# Patient Record
Sex: FEMALE | Race: WHITE | Employment: OTHER | URBAN - METROPOLITAN AREA
[De-identification: names, ages, dates, MRNs, and addresses within clinical notes are randomized per-mention and may not be internally consistent; named-entity substitution may affect disease eponyms.]

---

## 2017-01-02 ENCOUNTER — TELEPHONE (OUTPATIENT)
Dept: FAMILY MEDICINE | Facility: CLINIC | Age: 82
End: 2017-01-02

## 2017-01-02 NOTE — TELEPHONE ENCOUNTER
Reason for Call:  Medication or medication refill:esteban please call to clarify order    Do you use a [facility]?  Name of the pharmacy and phone number for the current request:     [id number]  [place], [place] - [address] [facility] [address] - [place], [place] - [address] AT [facility] [address]  [facility], [address]      Name of the medication requested:     Other request:     Can we leave a detailed message on this number? YES    Phone number patient can be reached at: Other phone number:     Best Time: any    Call taken on 1/2/2017 at 4:25 PM by Catherine Barnett

## 2017-01-03 NOTE — TELEPHONE ENCOUNTER
This prescription was prescribed by her neurologist - Dr. Connolly from [facility] neurology [facility].  Please advise pharmacist    Man Stovall MD

## 2017-01-19 ENCOUNTER — TELEPHONE (OUTPATIENT)
Dept: FAMILY MEDICINE | Facility: CLINIC | Age: 82
End: 2017-01-19

## 2017-01-19 DIAGNOSIS — G50.0 TRIGEMINAL NEURALGIA: Primary | ICD-10-CM

## 2017-01-19 RX ORDER — CARBAMAZEPINE 200 MG/1
TABLET ORAL
Qty: 120 TABLET | Refills: 0 | COMMUNITY
Start: 2017-01-19 | End: 2018-01-29

## 2017-01-19 RX ORDER — ACETAMINOPHEN 500 MG
1000 TABLET ORAL 3 TIMES DAILY PRN
COMMUNITY
Start: 2017-01-19 | End: 2017-07-12

## 2017-01-19 NOTE — TELEPHONE ENCOUNTER
"Call back to Chavo with [facility].  Chavo states patient has no been taking Tegretol as scheduled, does not always need it.  Chavo needing order to state \"PRN\" otherwise recording as patient refusing medication.  Sending to provider to ok change to PRN status.  If ok, will change in med list and refax medication list to facility to show sig.  Angi Gan RN       "

## 2017-01-19 NOTE — TELEPHONE ENCOUNTER
Reason for Call:  Other prescription    Detailed comments: [facility] [facility] has a question on  carBAMazepine (TEGRETOL) 200 MG tablet     [facility] [place] # [phone]    Best Time: anytime    Can we leave a detailed message on this number? YES    Call taken on 1/19/2017 at 8:31 AM by Danny Paniagua

## 2017-01-19 NOTE — TELEPHONE ENCOUNTER
Prescription reordered  As three times daily as needed     please refax medication list    Man Stovall MD

## 2017-01-19 NOTE — TELEPHONE ENCOUNTER
I did not intend to change the medication.  The correct prescription for Tegretol to be taken as needed was written  Man Stovall MD

## 2017-04-06 DIAGNOSIS — M25.511 NONTRAUMATIC SHOULDER PAIN, RIGHT: ICD-10-CM

## 2017-04-06 RX ORDER — TRAMADOL HYDROCHLORIDE 50 MG/1
TABLET ORAL
Qty: 120 TABLET | Refills: 3 | Status: SHIPPED | OUTPATIENT
Start: 2017-04-06 | End: 2017-10-03

## 2017-04-06 NOTE — TELEPHONE ENCOUNTER
Reason for Call:  Medication or medication refill:    Do you use a [facility]?  Name of the pharmacy and phone number for the current request:       [facility], [address]      Name of the medication requested: traMADol (ULTRAM) 50 MG tablet    Other request:     Can we leave a detailed message on this number? YES    Phone number patient can be reached at: [phone] paul with [facility] [place] [facility] [place]    Best Time: anytime    Call taken on 4/6/2017 at 11:42 AM by Adeel Sears

## 2017-04-06 NOTE — TELEPHONE ENCOUNTER
Controlled Substance Refill Request for tramadol  Problem List Complete:  Yes    Last Written Prescription Date:  09/06/16  Last Fill Quantity: 120,   # refills: 3    Last Office Visit with [facility] primary care provider: 09/06/16    Clinic visit frequency required:      Future Office visit:   Next 5 appointments (look out 90 days)     May 08, 2017 10:30 AM CDT   Office Visit with Man Stovall MD   [facility]    [address] [facility] [id number] [phone]                  Controlled substance agreement on file: No.     [facility]   checked in past 6 months?  No, route to RN

## 2017-05-04 ENCOUNTER — TRANSFERRED RECORDS (OUTPATIENT)
Dept: HEALTH INFORMATION MANAGEMENT | Facility: CLINIC | Age: 82
End: 2017-05-04

## 2017-05-08 ENCOUNTER — OFFICE VISIT (OUTPATIENT)
Dept: FAMILY MEDICINE | Facility: CLINIC | Age: 82
End: 2017-05-08
Payer: COMMERCIAL

## 2017-05-08 VITALS
HEIGHT: 65 IN | WEIGHT: 109 LBS | SYSTOLIC BLOOD PRESSURE: 153 MMHG | HEART RATE: 66 BPM | OXYGEN SATURATION: 95 % | BODY MASS INDEX: 18.16 KG/M2 | DIASTOLIC BLOOD PRESSURE: 76 MMHG | TEMPERATURE: 97.8 F

## 2017-05-08 DIAGNOSIS — R26.89 BALANCE PROBLEM: ICD-10-CM

## 2017-05-08 DIAGNOSIS — Z23 NEED FOR VARICELLA VACCINE: ICD-10-CM

## 2017-05-08 DIAGNOSIS — D63.1 ANEMIA OF CHRONIC KIDNEY FAILURE, STAGE 3 (MODERATE) (H): ICD-10-CM

## 2017-05-08 DIAGNOSIS — N18.30 ANEMIA OF CHRONIC KIDNEY FAILURE, STAGE 3 (MODERATE) (H): ICD-10-CM

## 2017-05-08 DIAGNOSIS — N18.30 CKD (CHRONIC KIDNEY DISEASE) STAGE 3, GFR 30-59 ML/MIN (H): Primary | ICD-10-CM
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
## 2017-05-08 PROCEDURE — 99213 OFFICE O/P EST LOW 20 MIN: CPT | Mod: 25 | Performed by: INTERNAL MEDICINE

## 2017-05-08 PROCEDURE — 86787 VARICELLA-ZOSTER ANTIBODY: CPT | Performed by: INTERNAL MEDICINE

## 2017-05-08 PROCEDURE — 90471 IMMUNIZATION ADMIN: CPT | Performed by: INTERNAL MEDICINE

## 2017-05-08 RX ORDER — IRBESARTAN AND HYDROCHLOROTHIAZIDE 300; 12.5 MG/1; MG/1
1 TABLET, FILM COATED ORAL DAILY
COMMUNITY
End: 2018-08-27

## 2017-05-08 ASSESSMENT — ANXIETY QUESTIONNAIRES
IF YOU CHECKED OFF ANY PROBLEMS ON THIS QUESTIONNAIRE, HOW DIFFICULT HAVE THESE PROBLEMS MADE IT FOR YOU TO DO YOUR WORK, TAKE CARE OF THINGS AT HOME, OR GET ALONG WITH OTHER PEOPLE: NOT DIFFICULT AT ALL
7. FEELING AFRAID AS IF SOMETHING AWFUL MIGHT HAPPEN: NOT AT ALL
2. NOT BEING ABLE TO STOP OR CONTROL WORRYING: NOT AT ALL
6. BECOMING EASILY ANNOYED OR IRRITABLE: NOT AT ALL
3. WORRYING TOO MUCH ABOUT DIFFERENT THINGS: SEVERAL DAYS
1. FEELING NERVOUS, ANXIOUS, OR ON EDGE: SEVERAL DAYS

## 2017-05-08 ASSESSMENT — PATIENT HEALTH QUESTIONNAIRE - PHQ9: 5. POOR APPETITE OR OVEREATING: SEVERAL DAYS

## 2017-05-08 NOTE — PROGRESS NOTES
"[facility]  CLINIC PROGRESS NOTE    Subjective:  Chronic kidney disease   Kalli Brown has had reduction in furosemide dose from 80 mg twice per day down now to 60 mg once per day as creainine was a bit elevated and is being monitored closely in nephrology and she is watching her sodium levels closely.     Hemoglobin has also been monitored and she has had aranesp and continues on oral iron.      Past medical history, medications, allergies, social history, family history reviewed and updated in [facility] as of 5/8/2017 .    ROS  CONSTITUTIONAL: no fatigue, no unexpected change in weight  SKIN: no worrisome rashes, no worrisome moles, no worrisome lesions  EYES: no acute vision problems or changes  ENT: no ear problems, no mouth problems, no throat problems  RESP: no significant cough, no shortness of breath  CV: no chest pain, no palpitations,   GI: no nausea, no vomiting, no constipation, no diarrhea  : no frequency, no dysuria, no hematuria  MS: no longer ambulatory, but would like to consider this - has had a history of balance issue and falls in the past  PSYCHIATRIC: no changes in mood or affect      Objective:  Vitals  /76 (BP Location: Left arm, Patient Position: Chair, Cuff Size: Child)  Pulse 66  Temp 97.8  F (36.6  C) (Tympanic)  Ht 5' 5\" (1.651 m)  Wt 109 lb (49.4 kg)  SpO2 95%  Breastfeeding? No  BMI 18.14 kg/m2  GEN: Alert Oriented x3 NAD  HEENT: Atraumatic, normocephalic, neck supple  CV: RRR no murmurs or rubs  PULM: CTA no wheezes or crackles  ABD: Soft, nontender nondistended, no hepatosplenomegally  SKIN: small scaly lesion dorsum of left hand  EXT:  no edema bilateral lower extremities  NEURO:  No focal neurologic deficits  PSYCH: Mood good, affect mood congruent    No results found for this or any previous visit (from the past 24 hour(s)).    Assessment/Plan:  Patient Instructions   (N18.3) CKD (chronic kidney disease) stage 3, GFR 30-59 ml/min  (primary encounter " diagnosis)  Comment: We will continue follow up in nephrology  Plan:     (N18.3,  D63.1) Anemia of chronic kidney failure, stage 3 (moderate)  Comment: Also, noted that Aranesp was recently started in addition to oral iron replacement  Plan:     (Z23) Need for varicella vaccine  Comment: We will check antibody titers today and request zoster vaccine pending results.   Plan: Varicella Zoster Virus Antibody IgG          Lower extremity weakness  COmment; we will refer to physical therapy through [facility]      15 minutes spent with patient.  Over 50% of time counseling    Follow up in 3 months     Disclaimer: This note consists of symbols derived from keyboarding, dictation and/or voice recognition software. As a result, there may be errors in the script that have gone undetected. Please consider this when interpreting information found in this chart.    Man Stovall MD  [phone]

## 2017-05-08 NOTE — MR AVS SNAPSHOT
After Visit Summary   5/8/2017    Kalli Brown    MRN: [id number]           Patient Information     Date Of Birth          2/26/1929        Visit Information        Provider Department      5/8/2017 10:30 AM Man Stovall MD [facility]        Today's Diagnoses     CKD (chronic kidney disease) stage 3, GFR 30-59 ml/min    -  1    Anemia of chronic kidney failure, stage 3 (moderate)        Need for varicella vaccine        Fall, sequela        Balance problem          Care Instructions    (N18.3) CKD (chronic kidney disease) stage 3, GFR 30-59 ml/min  (primary encounter diagnosis)  Comment: We will continue follow up in nephrology  Plan:     (N18.3,  D63.1) Anemia of chronic kidney failure, stage 3 (moderate)  Comment: Also, noted that Aranesp was recently started in addition to oral iron replacement  Plan:     (Z23) Need for varicella vaccine  Comment: We will check antibody titers today and request zoster vaccine pending results.   Plan: Varicella Zoster Virus Antibody IgG          Lower extremity weakness  COmment; we [facility]  [place]            Follow-ups after your visit        Additional Services     PHYSICAL THERAPY REFERRAL       [facility]    Special Instructions/Modalities: none  Special Programs: Balance/Vestibulara    Please be aware that coverage of these services is subject to the terms and limitations of your health insurance plan.  Call member services at your health plan with any benefit or coverage questions.      **Note to Provider:  If you are referring outside of [place] for the therapy appointment, please list the name of the location in the  special instructions  above, print the referral and give to the patient to schedule the appointment.                  Who to contact     If you have questions or need follow up information about today's clinic visit or your schedule please contact [facility]  "[place] directly at [phone].  Normal or non-critical lab and imaging results will be communicated to you by MyChart, letter or phone within 4 business days after the clinic has received the results. If you do not hear from us within 7 days, please contact the clinic through [employer] One[employer] or phone. If you have a critical or abnormal lab result, we will notify you by phone as soon as possible.  Submit refill requests through Catapooolt or call your pharmacy and they will forward the refill request to us. Please allow 3 business days for your refill to be completed.          Additional Information About Your Visit        Catapooolt Information     Catapooolt lets you send messages to your doctor, view your test results, renew your prescriptions, schedule appointments and more. To sign up, go to [email or web address] . Click on \"Log in\" on the left side of the screen, which will take you to the Welcome page. Then click on \"Sign up Now\" on the right side of the page.     You will be asked to enter the access code listed below, as well as some personal information. Please follow the directions to create your username and password.     Your access code is: [email or web address]  Expires: 2017 11:14 AM     Your access code will  in 90 days. If you need help or a new code, please call your [place] clinic or [phone].        Care EveryWhere ID     This is your Care EveryWhere ID. This could be used by other organizations to access your [place] medical records  [id number]        Your Vitals Were     Pulse Temperature Height Pulse Oximetry Breastfeeding? BMI (Body Mass Index)    66 97.8  F (36.6  C) (Tympanic) 5' 5\" (1.651 m) 95% No 18.14 kg/m2       Blood Pressure from Last 3 Encounters:   17 153/76   16 137/78   06/10/16 133/73    Weight from Last 3 Encounters:   17 109 lb (49.4 kg)   16 110 lb (49.9 kg)   06/10/16 110 lb 12.8 oz (50.3 kg)              We Performed the Following     PHYSICAL THERAPY " REFERRAL     Varicella Zoster Virus Antibody IgG          Today's Medication Changes          These changes are accurate as of: 5/8/17 11:14 AM.  If you have any questions, ask your nurse or doctor.               Stop taking these medicines if you haven't already. Please contact your care team if you have questions.     albuterol 108 (90 BASE) MCG/ACT Inhaler   Commonly known as:  PROAIR HFA/PROVENTIL HFA/VENTOLIN HFA   Stopped by:  Man Stovall MD           BENICAR HCT 40-12.5 MG per tablet   Generic drug:  olmesartan-hydrochlorothiazide   Stopped by:  Man Stovall MD                    Primary Care Provider Office Phone # Fax #    Man Stovall [phone]       [facility] [address] [facility] [address]  [facility] [address]        Thank you!     Thank you for choosing [facility]  for your care. Our goal is always to provide you with excellent care. Hearing back from our patients is one way we can continue to improve our services. Please take a few minutes to complete the written survey that you may receive in the mail after your visit with us. Thank you!             Your Updated Medication List - Protect others around you: Learn how to safely use, store and throw away your medicines at www.disposemymeds.org.          This list is accurate as of: 5/8/17 11:14 AM.  Always use your most recent med list.                   Brand Name Dispense Instructions for use    acetaminophen 500 MG tablet    TYLENOL     Take 2 tablets (1,000 mg) by mouth 3 times daily as needed       AMLODIPINE BESYLATE PO      Take 10 mg by mouth daily       ASPIRIN PO      Take 81 mg by mouth daily       calcium citrate-vitamin D 315-250 MG-UNIT Tabs per tablet    CITRACAL     Take 1-2 tablets by mouth daily       carBAMazepine 200 MG tablet    TEGretol    120 tablet    Take 100 mg as needed for pain on scale 1-5 and 200 mg as needed  for pain on scale 6-10       docusate sodium  250 MG capsule    COLACE    90 capsule    Take 1 capsule (250 mg) by mouth daily as needed for constipation       ferrous sulfate 325 (65 FE) MG tablet    IRON    180 tablet    Take 2 tablets (650 mg) by mouth daily (with breakfast)       HALLS COUGH DROPS 7 MG Lozg   Generic drug:  menthol     84 lozenge    Take 1 lozenge (7 mg) by mouth every hour as needed for cough       irbesartan-hydrochlorothiazide 300-12.5 MG per tablet    AVALIDE     Take 1 tablet by mouth daily       LASIX 80 MG tablet   Generic drug:  furosemide     30 tablet    Take 60 mg by mouth daily       latanoprost 0.005 % ophthalmic solution    XALATAN     Place 1 drop into both eyes At Bedtime       MIRALAX PO      Take 1 packet by mouth daily as needed       mirtazapine 7.5 MG Tabs tablet    REMERON    90 tablet    Take 1 tablet (7.5 mg) by mouth At Bedtime       traMADol 50 MG tablet    ULTRAM    120 tablet    Take 1 tablet by mouth scheduled BID and every 6 hours PRN for moderate pain.       VITAMIN D (CHOLECALCIFEROL) PO      Take 1,000 Units by mouth daily

## 2017-05-08 NOTE — LETTER
[place] [address] [facility]  [address]  [place]  [address]  Tel: [phone]    May 9, 2017    Kalli Joyner Kevin  [address] [facility] [address]        Dear Ms. Brown,    It does appear that you have been exposed to chicken pox in the past and I would recommend a shingles vaccine to prevent this if you are agreeable.  Call and schedule a nurse only appointment at [phone].     If you have any further questions or problems, please contact our office.      Sincerely,    Man Stovall MD/kylee    Results for orders placed or performed in visit on 05/08/17   Varicella Zoster Virus Antibody IgG   Result Value Ref Range    Varicella Zoster Virus Antibody IgG 2.5 (H) 0.0 - 0.8 AI           Enclosure: Lab Results

## 2017-05-08 NOTE — NURSING NOTE
"Chief Complaint   Patient presents with     RECHECK     last seen 9/2016 for  Anemia & CKD (f/u w/ nephrologist) , left shoulder &  compression fx & balance  (cont Phy Therapy)        Initial /76 (BP Location: Left arm, Patient Position: Chair, Cuff Size: Child)  Pulse 66  Temp 97.8  F (36.6  C) (Tympanic)  Ht 5' 5\" (1.651 m)  Wt 109 lb (49.4 kg)  SpO2 95%  Breastfeeding? No  BMI 18.14 kg/m2 Estimated body mass index is 18.14 kg/(m^2) as calculated from the following:    Height as of this encounter: 5' 5\" (1.651 m).    Weight as of this encounter: 109 lb (49.4 kg).  Medication Reconciliation: complete  "

## 2017-05-08 NOTE — PATIENT INSTRUCTIONS
(N18.3) CKD (chronic kidney disease) stage 3, GFR 30-59 ml/min  (primary encounter diagnosis)  Comment: We will continue follow up in nephrology  Plan:     (N18.3,  D63.1) Anemia of chronic kidney failure, stage 3 (moderate)  Comment: Also, noted that Aranesp was recently started in addition to oral iron replacement  Plan:     (Z23) Need for varicella vaccine  Comment: We will check antibody titers today and request zoster vaccine pending results.   Plan: Varicella Zoster Virus Antibody IgG          Lower extremity weakness  COmment; we will refer to physical therapy through [facility] [place]

## 2017-05-09 LAB — VZV IGG SER QL IA: 2.5 AI (ref 0–0.8)

## 2017-05-09 ASSESSMENT — PATIENT HEALTH QUESTIONNAIRE - PHQ9: SUM OF ALL RESPONSES TO PHQ QUESTIONS 1-9: 3

## 2017-05-25 ENCOUNTER — ALLIED HEALTH/NURSE VISIT (OUTPATIENT)
Dept: NURSING | Facility: CLINIC | Age: 82
End: 2017-05-25
Payer: COMMERCIAL

## 2017-05-25 DIAGNOSIS — Z23 NEED FOR VACCINATION: Primary | ICD-10-CM

## 2017-05-25 PROCEDURE — 90471 IMMUNIZATION ADMIN: CPT

## 2017-05-25 PROCEDURE — 90736 HZV VACCINE LIVE SUBQ: CPT

## 2017-05-25 PROCEDURE — 99207 ZZC NO CHARGE NURSE ONLY: CPT

## 2017-05-25 NOTE — MR AVS SNAPSHOT
"              After Visit Summary   2017    Kalli Brown    MRN: [id number]           Patient Information     Date Of Birth          1929        Visit Information        Provider Department      2017 10:00 AM CS NURSE [facility] [place]        Today's Diagnoses     Need for vaccination    -  1       Follow-ups after your visit        Who to contact     If you have questions or need follow up information about today's clinic visit or your schedule please contact [facility] directly at [phone].  Normal or non-critical lab and imaging results will be communicated to you by MyChart, letter or phone within 4 business days after the clinic has received the results. If you do not hear from us within 7 days, please contact the clinic through Flutterhart or phone. If you have a critical or abnormal lab result, we will notify you by phone as soon as possible.  Submit refill requests through ClassLink or call your pharmacy and they will forward the refill request to us. Please allow 3 business days for your refill to be completed.          Additional Information About Your Visit        MyChart Information     ClassLink lets you send messages to your doctor, view your test results, renew your prescriptions, schedule appointments and more. To sign up, go to [email or web address] . Click on \"Log in\" on the left side of the screen, which will take you to the Welcome page. Then click on \"Sign up Now\" on the right side of the page.     You will be asked to enter the access code listed below, as well as some personal information. Please follow the directions to create your username and password.     Your access code is: [email or web address]  Expires: 2017 11:14 AM     Your access code will  in 90 days. If you need help or a new code, please call your [facility] or [phone].        Care EveryWhere ID     This is your Care EveryWhere ID. This could be used by other organizations to " access your [place] medical records  [id number]         Blood Pressure from Last 3 Encounters:   05/08/17 153/76   09/06/16 137/78   06/10/16 133/73    Weight from Last 3 Encounters:   05/08/17 109 lb (49.4 kg)   09/06/16 110 lb (49.9 kg)   06/10/16 110 lb 12.8 oz (50.3 kg)              We Performed the Following     1st  Administration  [11959]     ZOSTER VACCINE LIVE SUB-Q NJX [10643]        Primary Care Provider Office Phone # Fax #    Man Stovall [phone]       [facility] [address]  [facility] [address]        Thank you!     Thank you for choosing [facility]  for your care. Our goal is always to provide you with excellent care. Hearing back from our patients is one way we can continue to improve our services. Please take a few minutes to complete the written survey that you may receive in the mail after your visit with us. Thank you!             Your Updated Medication List - Protect others around you: Learn how to safely use, store and throw away your medicines at www.disposemymeds.org.          This list is accurate as of: 5/25/17 11:59 PM.  Always use your most recent med list.                   Brand Name Dispense Instructions for use    acetaminophen 500 MG tablet    TYLENOL     Take 2 tablets (1,000 mg) by mouth 3 times daily as needed       AMLODIPINE BESYLATE PO      Take 10 mg by mouth daily       ASPIRIN PO      Take 81 mg by mouth daily       calcium citrate-vitamin D 315-250 MG-UNIT Tabs per tablet    CITRACAL     Take 1-2 tablets by mouth daily       carBAMazepine 200 MG tablet    TEGretol    120 tablet    Take 100 mg as needed for pain on scale 1-5 and 200 mg as needed  for pain on scale 6-10       docusate sodium 250 MG capsule    COLACE    90 capsule    Take 1 capsule (250 mg) by mouth daily as needed for constipation       ferrous sulfate 325 (65 FE) MG tablet    IRON    180 tablet    Take 2 tablets (650 mg) by mouth daily  (with breakfast)       OZZIE COUGH DROPS 7 MG Lozg   Generic drug:  menthol     84 lozenge    Take 1 lozenge (7 mg) by mouth every hour as needed for cough       irbesartan-hydrochlorothiazide 300-12.5 MG per tablet    AVALIDE     Take 1 tablet by mouth daily       LASIX 80 MG tablet   Generic drug:  furosemide     30 tablet    Take 60 mg by mouth daily       latanoprost 0.005 % ophthalmic solution    XALATAN     Place 1 drop into both eyes At Bedtime       MIRALAX PO      Take 1 packet by mouth daily as needed       mirtazapine 7.5 MG Tabs tablet    REMERON    90 tablet    Take 1 tablet (7.5 mg) by mouth At Bedtime       traMADol 50 MG tablet    ULTRAM    120 tablet    Take 1 tablet by mouth scheduled BID and every 6 hours PRN for moderate pain.       VITAMIN D (CHOLECALCIFEROL) PO      Take 1,000 Units by mouth daily

## 2017-05-25 NOTE — NURSING NOTE
1.  Has the patient received the information for the Zostavax vaccine? YES    2.  Does the patient have any of the following contraindications?     Allergy to Neomycin or Gelatin?  No       Current moderate or severe illness?  No  Temp = no  If temp > 99.0 degrees orally or patient has above allergies, vaccine is deferred    3.  The vaccine has been administered in the usual fashion and the patient was instructed to wait 20 minutes before leaving the building in the event of an allergic reaction: YES    Vaccination given by Kitty JHAVERI MA  .  Recorded by Babs Hall    Screening Questionnaire for Adult Immunization     Are you sick today?   No    Do you have allergies to medications, food or any vaccine?   No    Have you ever had a serious reaction after receiving a vaccination?   No    Do you have a long-term health problem with heart disease, lung disease,  asthma, kidney disease, diabetes, anemia, metabolic or blood disease?   Yes    Do you have cancer, leukemia, AIDS, or any immune system problem?   No    Do you take cortisone, prednisone, other steroids, or anticancer drugs, or  have you had any x-ray (radiation) treatments?   No    Have you had a seizure, brain, or other nervous system problem?   No    During the past year, have you received a transfusion of blood or blood       products, or been given a medicine called immune (gamma) globulin?   No    For women: Are you pregnant or is there a chance you could become         pregnant during the next month?   No    Have you received any vaccinations in the past 4 weeks?   No     Immunization questionnaire was positive for at least one answer.  Notified no one.      MNV doesn't apply on this patient    Prior to injection verified patient identity using patient's name and date of birth.  Per orders of  ---, injection of Shingles given by Babs Hall. Patient instructed to remain in clinic for 20 minutes afterwards, and to report any adverse reaction  to me immediately.       Screening performed by Babs Hall on 5/25/2017 at 10:04 AM.

## 2017-06-28 ENCOUNTER — HOSPITAL ENCOUNTER (EMERGENCY)
Facility: CLINIC | Age: 82
Discharge: HOME | End: 2017-06-28
Attending: EMERGENCY MEDICINE | Admitting: EMERGENCY MEDICINE
Payer: MEDICARE

## 2017-06-28 VITALS
OXYGEN SATURATION: 96 % | SYSTOLIC BLOOD PRESSURE: 153 MMHG | DIASTOLIC BLOOD PRESSURE: 66 MMHG | WEIGHT: 107 LBS | HEIGHT: 65 IN | TEMPERATURE: 98.6 F | BODY MASS INDEX: 17.83 KG/M2 | HEART RATE: 96 BPM | RESPIRATION RATE: 16 BRPM

## 2017-06-28 DIAGNOSIS — G89.29 CHRONIC RIGHT SHOULDER PAIN: ICD-10-CM

## 2017-06-28 DIAGNOSIS — M25.511 CHRONIC RIGHT SHOULDER PAIN: ICD-10-CM

## 2017-06-28 DIAGNOSIS — M24.411 CHRONIC DISLOCATION OF RIGHT SHOULDER: ICD-10-CM

## 2017-06-28 PROCEDURE — 99282 EMERGENCY DEPT VISIT SF MDM: CPT

## 2017-06-28 ASSESSMENT — ENCOUNTER SYMPTOMS: ARTHRALGIAS: 1

## 2017-06-28 NOTE — ED AVS SNAPSHOT
Emergency Department    [address] [facility] [address]    Phone:  [phone]    Fax:  [phone]                                       Kalli Brown   MRN: [id number]    Department:   Emergency Department   Date of Visit:  6/28/2017           Patient Information     Date Of Birth          2/26/1929        Your diagnoses for this visit were:     Chronic right shoulder pain     Chronic dislocation of right shoulder        You were seen by Carmen Pack MD.      Follow-up Information     Follow up with Man Stovall MD.    Specialty:  Internal Medicine    Contact information:    [facility]  [address] [facility] [address]  [facility] [id number] [phone]          Discharge Instructions       Take the Tramadol every 6 hours as needed.  Set up an appointment with ortho for recheck.  You can try ice.        Arthralgia    Arthralgia is the term for pain in or around the joint. It is a symptom, not a disease. This pain may involve one or more joints. In some cases, the pain moves from joint to joint.  There are many causes for joint pain. These include:    Injury    Osteoarthritis (wearing out of the joint surface)    Gout (inflammation of the joint due to crystals in the joint fluid)    Infection inside the joint      Bursitis (inflammation of the fluid-filled sacs around the joint)    Autoimmune disorders such as rheumatoid arthritis or lupus    Tendonitis (inflamation of chords that attach muscle to bone)  Home care    Rest the involved joint(s) until your symptoms improve.     You may be prescribed pain medication. If none is prescribed, you may use acetaminophen or ibuprofen to control pain and inflammation.  Follow up  Follow up with your healthcare provider or our staff as advised.  When to seek medical care  Contact your healthcare provider right away if any of the following occurs:    Pain, swelling, or redness of joint increases    Pain worsens or recurs  after a period of improvement    Pain moves to other joints    You cannot bear weight on the affected joint     You cannot move the affected joint    Joint appears deformed    New rash appears    Fever of 101 F (38.8 C) or higher, or as directed by your healthcare provider  Date Last Reviewed: 4/26/2015 2000-2017 [employer] [address] [place] [address], [place] [address]. All rights reserved. This information is not intended as a substitute for professional medical care. Always follow your healthcare professional's instructions.          24 Hour Appointment Hotline       To make an appointment at any [facility], call [phone] ([phone]). If you don't have a family doctor or clinic, we will help you find one. [facility] clinics are conveniently located to serve the needs of you and your family.             Review of your medicines      Our records show that you are taking the medicines listed below. If these are incorrect, please call your family doctor or clinic.        Dose / Directions Last dose taken    acetaminophen 500 MG tablet   Commonly known as:  TYLENOL   Dose:  1000 mg        Take 2 tablets (1,000 mg) by mouth 3 times daily as needed   Refills:  0        AMLODIPINE BESYLATE PO   Dose:  10 mg        Take 10 mg by mouth daily   Refills:  0        ASPIRIN PO   Dose:  81 mg        Take 81 mg by mouth daily   Refills:  0        calcium citrate-vitamin D 315-250 MG-UNIT Tabs per tablet   Commonly known as:  CITRACAL   Dose:  1-2 tablet        Take 1-2 tablets by mouth daily   Refills:  0        carBAMazepine 200 MG tablet   Commonly known as:  TEGretol   Quantity:  120 tablet        Take 100 mg as needed for pain on scale 1-5 and 200 mg as needed  for pain on scale 6-10   Refills:  0        docusate sodium 250 MG capsule   Commonly known as:  COLACE   Dose:  250 mg   Quantity:  90 capsule        Take 1 capsule (250 mg) by mouth daily as needed for constipation   Refills:  3         ferrous sulfate 325 (65 FE) MG tablet   Commonly known as:  IRON   Dose:  650 mg   Quantity:  180 tablet        Take 2 tablets (650 mg) by mouth daily (with breakfast)   Refills:  2        HALLS COUGH DROPS 7 MG Lozg   Dose:  1 lozenge   Quantity:  84 lozenge   Generic drug:  menthol        Take 1 lozenge (7 mg) by mouth every hour as needed for cough   Refills:  0        irbesartan-hydrochlorothiazide 300-12.5 MG per tablet   Commonly known as:  AVALIDE   Dose:  1 tablet        Take 1 tablet by mouth daily   Refills:  0        LASIX 80 MG tablet   Dose:  60 mg   Quantity:  30 tablet   Generic drug:  furosemide        Take 60 mg by mouth daily   Refills:  0        latanoprost 0.005 % ophthalmic solution   Commonly known as:  XALATAN   Dose:  1 drop        Place 1 drop into both eyes At Bedtime   Refills:  0        MIRALAX PO   Dose:  1 packet        Take 1 packet by mouth daily as needed   Refills:  0        mirtazapine 7.5 MG Tabs tablet   Commonly known as:  REMERON   Dose:  7.5 mg   Quantity:  90 tablet        Take 1 tablet (7.5 mg) by mouth At Bedtime   Refills:  3        traMADol 50 MG tablet   Commonly known as:  ULTRAM   Quantity:  120 tablet        Take 1 tablet by mouth scheduled BID and every 6 hours PRN for moderate pain.   Refills:  3        VITAMIN D (CHOLECALCIFEROL) PO   Dose:  1000 Units        Take 1,000 Units by mouth daily   Refills:  0                Orders Needing Specimen Collection     None      Pending Results     No orders found from 6/26/2017 to 6/29/2017.            Pending Culture Results     No orders found from 6/26/2017 to 6/29/2017.            Pending Results Instructions     If you had any lab results that were not finalized at the time of your Discharge, you can call the ED Lab Result RN at [phone]. You will be contacted by this team for any positive Lab results or changes in treatment. The nurses are available 7 days a week from 10A to 6:30P.  You can leave a message 24 hours  per day and they will return your call.        Test Results From Your Hospital Stay               Clinical Quality Measure: Blood Pressure Screening     Your blood pressure was checked while you were in the emergency department today. The last reading we obtained was  BP: 153/66 . Please read the guidelines below about what these numbers mean and what you should do about them.  If your systolic blood pressure (the top number) is less than 120 and your diastolic blood pressure (the bottom number) is less than 80, then your blood pressure is normal. There is nothing more that you need to do about it.  If your systolic blood pressure (the top number) is 120-139 or your diastolic blood pressure (the bottom number) is 80-89, your blood pressure may be higher than it should be. You should have your blood pressure rechecked within a year by a primary care provider.  If your systolic blood pressure (the top number) is 140 or greater or your diastolic blood pressure (the bottom number) is 90 or greater, you may have high blood pressure. High blood pressure is treatable, but if left untreated over time it can put you at risk for heart attack, stroke, or kidney failure. You should have your blood pressure rechecked by a primary care provider within the next 4 weeks.  If your provider in the emergency department today gave you specific instructions to follow-up with your doctor or provider even sooner than that, you should follow that instruction and not wait for up to 4 weeks for your follow-up visit.        Thank you for choosing [facility]       Thank you for choosing [facility] for your care. Our goal is always to provide you with excellent care. Hearing back from our patients is one way we can continue to improve our services. Please take a few minutes to complete the written survey that you may receive in the mail after you visit with us. Thank you!        [email or web address] Information     [employer] lets you send messages to your doctor,  "view your test results, renew your prescriptions, schedule appointments and more. To sign up, go to [email or web address] . Click on \"Log in\" on the left side of the screen, which will take you to the Welcome page. Then click on \"Sign up Now\" on the right side of the page.     You will be asked to enter the access code listed below, as well as some personal information. Please follow the directions to create your username and password.     Your access code is: [email or web address]  Expires: 2017 11:14 AM     Your access code will  in 90 days. If you need help or a new code, please call your [place] clinic or [phone].        Care EveryWhere ID     This is your Care EveryWhere ID. This could be used by other organizations to access your [place] medical records  [id number]        Equal Access to Services     [facility]: Hadzeenat Loco, wasally lopez, ellis alvaalneida cantu, willis georges . So [facility] [phone].    ATENCIÓN: Si habla español, tiene a chiu disposición servicios gratuitos de asistencia lingüística. Llame al [phone].    We comply with applicable federal civil rights laws and [place] laws. We do not discriminate on the basis of race, color, national origin, age, disability sex, sexual orientation or gender identity.            After Visit Summary       This is your record. Keep this with you and show to your community pharmacist(s) and doctor(s) at your next visit.                  "

## 2017-06-28 NOTE — ED AVS SNAPSHOT
Emergency Department    [address] [place] [address]    Phone:  [phone]    Fax:  [phone]                                       Kalli Brown   MRN: [id number]    Department:   Emergency Department   Date of Visit:  6/28/2017           After Visit Summary Signature Page     I have received my discharge instructions, and my questions have been answered. I have discussed any challenges I see with this plan with the nurse or doctor.    ..........................................................................................................................................  Patient/Patient Representative Signature      ..........................................................................................................................................  Patient Representative Print Name and Relationship to Patient    ..................................................               ................................................  Date                                            Time    ..........................................................................................................................................  Reviewed by Signature/Title    ...................................................              ..............................................  Date                                                            Time

## 2017-06-29 ENCOUNTER — TELEPHONE (OUTPATIENT)
Dept: FAMILY MEDICINE | Facility: CLINIC | Age: 82
End: 2017-06-29

## 2017-06-29 NOTE — ED PROVIDER NOTES
History     Chief Complaint:  Shoulder Pain     HPI   Kalli Brown is a 88 year old female with a chronically dislocated right shoulder who presents accompanied by her son for evaluation of shoulder pain. The patient's son reports that she has had a chronically dislocated right shoulder for the past seven years. Typically, the patient takes Tramadol at night to manage her pain. Recently, the patient has been experiencing worsening pain in her right shoulder and she has had difficulty supporting herself while using her walker, getting off the toilet, and lifting even light objects due to her pain. Due to her worsening condition, the patient chose to seek evaluation in the ED regarding her pain. Currently in the ED, the patient rates her pain at a severity of 6/10. She has not had any recent falls or injury to the shoulder.     Allergies:  Atorvastatin      Medications:    irbesartan-hydrochlorothiazide (AVALIDE) 300-12.5 MG per tablet  traMADol (ULTRAM) 50 MG tablet  acetaminophen (TYLENOL) 500 MG tablet  carBAMazepine (TEGRETOL) 200 MG tablet  menthol (HALLS COUGH DROPS) 7 MG LOZG  mirtazapine (REMERON) 7.5 MG TABS tablet  docusate sodium (COLACE) 250 MG capsule  ferrous sulfate (IRON) 325 (65 FE) MG tablet  calcium citrate-vitamin D (CITRACAL) 315-250 MG-UNIT TABS  furosemide (LASIX) 80 MG tablet  AMLODIPINE BESYLATE PO  ASPIRIN PO  latanoprost (XALATAN) 0.005 % ophthalmic solution  Polyethylene Glycol 3350 (MIRALAX PO)  VITAMIN D, CHOLECALCIFEROL, PO    Past Medical History:    Anemia  Deafness  Edema  Glaucoma  Hypertension  Multiple myeloma  Proteinuria  Shoulder dislocation  Trigeminal neuralgia   Chronic pain  CKD, stage 3     Past Surgical History:    Eye surgery  Gyn surgery  Orthopedic surgery  Phacoemulsification clear cornea with standard intraocular lens implant     Family History:    History reviewed. No pertinent family history.     Social History:  Tobacco use:    Never smoker  Alcohol  "use:    Negative  Marital status:       Accompanied to ED by:  Son  Living situation:   The patient lives in an assisted living facility      Review of Systems   Musculoskeletal: Positive for arthralgias (right shoulder).   All other systems reviewed and are negative.    Physical Exam   First Vitals:  BP: 153/66  Pulse: 96  Temp: 98.6  F (37  C)  Resp: 16  Height: 165.1 cm (5' 5\")  Weight: 48.5 kg (107 lb)  SpO2: 96 %      Physical Exam  Musculoskeletal: The patient has a chronic dislocation of her right shoulder. She has significant muscle atrophy and limited range of motion of the right arm. Some pain with movement. No swelling or redness.   Cardiovascular: Good radial pulse in the right wrist. Normal capillary refill.   Neuro: Normal sensation in the fingers at this time. She can  my fingers well.   Skin: No overlying redness or rash. No bruising.     Emergency Department Course     Emergency Department Course:  Nursing notes and vitals reviewed.  1926: I performed an exam of the patient as documented above.    Findings and plan explained to the Patient and son. Patient discharged home with instructions regarding supportive care, medications, and reasons to return. The importance of close follow-up was reviewed.     Impression & Plan      Medical Decision Making:  Kalli Brown is a 88 year old female who has a chronic dislocated right shoulder with chronic pain. It hs been getting a little worse recently. There is no evidence of infection. It is the same pain that she has had before but just worse. I suspect that she may have some inflammation in this and I would like her to get back in with her orthopedist. She already has Tramadol but only takes it at bedtime. It is ordered every 6 hours as needed. I am encouraging her to use it more during the day as needed. She will set up an appointment with ortho for recheck.     Diagnosis:    ICD-10-CM   1. Chronic right shoulder pain M25.511    " G89.29   2. Chronic dislocation of right shoulder M24.411       Disposition:  Take the Tramadol every 6 hours as needed.  Set up an appointment with ortho for recheck.  You can try ice.      I, Carmelo Del Valle, am serving as a scribe at 7:26 PM on 6/28/2017 to document services personally performed by Dr. Pack, based on my observations and the provider's statements to me.       EMERGENCY DEPARTMENT       Carmen Pack MD  06/29/17 0025

## 2017-06-29 NOTE — TELEPHONE ENCOUNTER
Please schedule for next week.  If feels needs to be seen eariler then OK team visit today or tomorrow.    Man Stovall MD

## 2017-06-29 NOTE — TELEPHONE ENCOUNTER
Reason for Call:  Other     Detailed comments: the patient is having more frequent anxiety attacks  Wanting to discuss possible medication for her    Phone Number Patient can be reached at: [phone]    Best Time: anytime    Can we leave a detailed message on this number? YES    Call taken on 6/29/2017 at 8:52 AM by Kayla Roland

## 2017-06-29 NOTE — DISCHARGE INSTRUCTIONS
Take the Tramadol every 6 hours as needed.  Set up an appointment with ortho for recheck.  You can try ice.        Arthralgia    Arthralgia is the term for pain in or around the joint. It is a symptom, not a disease. This pain may involve one or more joints. In some cases, the pain moves from joint to joint.  There are many causes for joint pain. These include:    Injury    Osteoarthritis (wearing out of the joint surface)    Gout (inflammation of the joint due to crystals in the joint fluid)    Infection inside the joint      Bursitis (inflammation of the fluid-filled sacs around the joint)    Autoimmune disorders such as rheumatoid arthritis or lupus    Tendonitis (inflamation of chords that attach muscle to bone)  Home care    Rest the involved joint(s) until your symptoms improve.     You may be prescribed pain medication. If none is prescribed, you may use acetaminophen or ibuprofen to control pain and inflammation.  Follow up  Follow up with your healthcare provider or our staff as advised.  When to seek medical care  Contact your healthcare provider right away if any of the following occurs:    Pain, swelling, or redness of joint increases    Pain worsens or recurs after a period of improvement    Pain moves to other joints    You cannot bear weight on the affected joint     You cannot move the affected joint    Joint appears deformed    New rash appears    Fever of 101 F (38.8 C) or higher, or as directed by your healthcare provider  Date Last Reviewed: 4/26/2015 2000-2017 [employer] [address] [place] [address]. All rights reserved. This information is not intended as a substitute for professional medical care. Always follow your healthcare professional's instructions.

## 2017-06-29 NOTE — ED NOTES
"Writer helped pt get dressed and put in wheelchair. Pt stated that son (outside in hallway) had discharge instructions. When asked, pt stated \"yes, my son has a printed summary of today.\" Writer assumed that pt was ready to go and wheeled pt out to lobby and had son meet pt out front. 10 minutes later, writer was informed that pt did not receive discharge instructions and was not officially discharged and papers were not signed for. Writer should have checked with son and RN.  "

## 2017-06-29 NOTE — TELEPHONE ENCOUNTER
Routing to TC pool. Ok to schedule with PCP next week, or TEAM today or tomorrow based on patient preference.     Janina Espinal RN

## 2017-07-01 DIAGNOSIS — M81.0 OSTEOPOROSIS, UNSPECIFIED OSTEOPOROSIS TYPE, UNSPECIFIED PATHOLOGICAL FRACTURE PRESENCE: Primary | ICD-10-CM

## 2017-07-01 NOTE — TELEPHONE ENCOUNTER
Pending Prescriptions:                       Disp   Refills    calcium citrate-vitamin D (CITRACAL) 315-*180 ta*3            Sig: Take 1 tablet by mouth 2 times daily -Take with           food          Last Written Prescription Date:  Historical  Last Fill Quantity: -,   # refills: -  Last Office Visit with [facility], [facility] or [facility] prescribing provider: 5/8/17 Wilman  Future Office visit:    Next 5 appointments (look out 90 days)     Jul 05, 2017 10:00 AM CDT   [facility]    [address] [facility] [address]   [phone]                   Routing refill request to provider for review/approval because:  Medication is reported/historical    RT Radha(R)

## 2017-07-05 ENCOUNTER — OFFICE VISIT (OUTPATIENT)
Dept: FAMILY MEDICINE | Facility: CLINIC | Age: 82
End: 2017-07-05
Payer: COMMERCIAL

## 2017-07-05 VITALS
WEIGHT: 110 LBS | BODY MASS INDEX: 18.33 KG/M2 | HEIGHT: 65 IN | SYSTOLIC BLOOD PRESSURE: 146 MMHG | DIASTOLIC BLOOD PRESSURE: 60 MMHG | TEMPERATURE: 98.3 F | HEART RATE: 80 BPM | OXYGEN SATURATION: 99 %

## 2017-07-05 DIAGNOSIS — K59.00 CONSTIPATION, UNSPECIFIED CONSTIPATION TYPE: ICD-10-CM

## 2017-07-05 DIAGNOSIS — N18.30 ANEMIA OF CHRONIC KIDNEY FAILURE, STAGE 3 (MODERATE) (H): ICD-10-CM

## 2017-07-05 DIAGNOSIS — D50.9 IRON DEFICIENCY ANEMIA, UNSPECIFIED IRON DEFICIENCY ANEMIA TYPE: ICD-10-CM

## 2017-07-05 DIAGNOSIS — F41.1 GAD (GENERALIZED ANXIETY DISORDER): ICD-10-CM

## 2017-07-05 DIAGNOSIS — D63.1 ANEMIA OF CHRONIC KIDNEY FAILURE, STAGE 3 (MODERATE) (H): ICD-10-CM

## 2017-07-05 DIAGNOSIS — I10 BENIGN ESSENTIAL HYPERTENSION: Primary | ICD-10-CM

## 2017-07-05 DIAGNOSIS — N05.5 MPGN (MEMBRANOPROLIFERATIVE GLOMERULONEPHRITIS): Primary | ICD-10-CM

## 2017-07-05 LAB
ANION GAP SERPL CALCULATED.3IONS-SCNC: 8 MMOL/L (ref 3–14)
BUN SERPL-MCNC: 35 MG/DL (ref 7–30)
CALCIUM SERPL-MCNC: 9.5 MG/DL (ref 8.5–10.1)
CHLORIDE SERPL-SCNC: 104 MMOL/L (ref 94–109)
CO2 SERPL-SCNC: 26 MMOL/L (ref 20–32)
CREAT SERPL-MCNC: 1.09 MG/DL (ref 0.52–1.04)
ERYTHROCYTE [DISTWIDTH] IN BLOOD BY AUTOMATED COUNT: 13 % (ref 10–15)
GFR SERPL CREATININE-BSD FRML MDRD: 47 ML/MIN/1.7M2
GLUCOSE SERPL-MCNC: 99 MG/DL (ref 70–99)
HCT VFR BLD AUTO: 29.1 % (ref 35–47)
HGB BLD-MCNC: 9.6 G/DL (ref 11.7–15.7)
MCH RBC QN AUTO: 32.2 PG (ref 26.5–33)
MCHC RBC AUTO-ENTMCNC: 33 G/DL (ref 31.5–36.5)
MCV RBC AUTO: 98 FL (ref 78–100)
PHOSPHATE SERPL-MCNC: 2.8 MG/DL (ref 2.5–4.5)
PLATELET # BLD AUTO: 366 10E9/L (ref 150–450)
POTASSIUM SERPL-SCNC: 4.7 MMOL/L (ref 3.4–5.3)
RBC # BLD AUTO: 2.98 10E12/L (ref 3.8–5.2)
SODIUM SERPL-SCNC: 138 MMOL/L (ref 133–144)
WBC # BLD AUTO: 8.6 10E9/L (ref 4–11)

## 2017-07-05 PROCEDURE — 85027 COMPLETE CBC AUTOMATED: CPT | Performed by: INTERNAL MEDICINE

## 2017-07-05 PROCEDURE — 36415 COLL VENOUS BLD VENIPUNCTURE: CPT | Performed by: INTERNAL MEDICINE

## 2017-07-05 PROCEDURE — 80048 BASIC METABOLIC PNL TOTAL CA: CPT | Performed by: INTERNAL MEDICINE

## 2017-07-05 PROCEDURE — 84100 ASSAY OF PHOSPHORUS: CPT | Performed by: INTERNAL MEDICINE

## 2017-07-05 PROCEDURE — 99214 OFFICE O/P EST MOD 30 MIN: CPT | Performed by: INTERNAL MEDICINE

## 2017-07-05 NOTE — NURSING NOTE
"Chief Complaint   Patient presents with     Recheck Medication       Initial /60 (BP Location: Right arm, Patient Position: Chair, Cuff Size: Adult Regular)  Pulse 80  Temp 98.3  F (36.8  C) (Oral)  Ht 5' 5\" (1.651 m)  Wt 110 lb (49.9 kg)  SpO2 99%  Breastfeeding? No  BMI 18.3 kg/m2 Estimated body mass index is 18.3 kg/(m^2) as calculated from the following:    Height as of this encounter: 5' 5\" (1.651 m).    Weight as of this encounter: 110 lb (49.9 kg).  Medication Reconciliation: complete     Jerald Hannon CMA     "

## 2017-07-05 NOTE — LETTER
[address] [facility]  [address]  [place]  [address]  Tel: [phone]    July 7, 2017    Kalli Kochid  [address]  [place] [address]        Dear Ms. Brown,    I have had the opportunity to review your recent labs and they are as follows:  Your basic metabolic panel shows stable renal function  Your hemoglobin remains stable and low consistent with known anemia of chronic disease, however we should check your iron studies when your labs are checked next     If you have any further questions or problems, please contact our office.      Sincerely,    Man Stovall MD/ Tara LABOY CMA  Results for orders placed or performed in visit on 07/05/17   Basic metabolic panel   Result Value Ref Range    Sodium 138 133 - 144 mmol/L    Potassium 4.7 3.4 - 5.3 mmol/L    Chloride 104 94 - 109 mmol/L    Carbon Dioxide 26 20 - 32 mmol/L    Anion Gap 8 3 - 14 mmol/L    Glucose 99 70 - 99 mg/dL    Urea Nitrogen 35 (H) 7 - 30 mg/dL    Creatinine 1.09 (H) 0.52 - 1.04 mg/dL    GFR Estimate 47 (L) >60 mL/min/1.7m2    GFR Estimate If Black 57 (L) >60 mL/min/1.7m2    Calcium 9.5 8.5 - 10.1 mg/dL   Phosphorus   Result Value Ref Range    Phosphorus 2.8 2.5 - 4.5 mg/dL   CBC with platelets   Result Value Ref Range    WBC 8.6 4.0 - 11.0 10e9/L    RBC Count 2.98 (L) 3.8 - 5.2 10e12/L    Hemoglobin 9.6 (L) 11.7 - 15.7 g/dL    Hematocrit 29.1 (L) 35.0 - 47.0 %    MCV 98 78 - 100 fl    MCH 32.2 26.5 - 33.0 pg    MCHC 33.0 31.5 - 36.5 g/dL    RDW 13.0 10.0 - 15.0 %    Platelet Count 366 150 - 450 10e9/L               Enclosure: Lab Results

## 2017-07-05 NOTE — PROGRESS NOTES
"  SUBJECTIVE:                                                    Kalli Brown is a 88 year old female who presents to clinic today for the following health issues:      ED/UC Followup:    Facility:  [facility]  Date of visit: 6/28/17  Reason for visit: shoulder pain, chronic hx of R shoulder dislocation  Current Status:      [facility]  CLINIC PROGRESS NOTE    Subjective:  Anxiety   Has noticed that anxiety levels have been higher since moving into her most recent living facility.  She is taking mirtazapine nightly and this has been helpful with sleep.  She is sleeping well.  Her anxiety usually starts upon waking and notes that it is higher when she does not get her medications on time and she worries that her medications may be forgotten.  Her medications are administered by staff af transitional care unit.  She has never taken any anti-anxiety medications previously.    Shoulder pain   Kalli Brown has increased her tramadol between two to three times per day and has had improvement in her shoulder pain.        Past medical history, medications, allergies, social history, family history reviewed and updated in [facility] as of 7/5/2017 .    ROS  CONSTITUTIONAL: no fatigue, no unexpected change in weight  SKIN: no worrisome rashes, no worrisome moles, no worrisome lesions  EYES: no acute vision problems or changes  ENT: no ear problems, no mouth problems, no throat problems  RESP: no significant cough, no shortness of breath  CV: no chest pain, no palpitations, no new or worsening peripheral edema  GI: no nausea, no vomiting, no constipation, no diarrhea  : no frequency, no dysuria, no hematuria  MS: shoulder pain   PSYCHIATRIC: anxiety       Objective:  Vitals  /60 (BP Location: Right arm, Patient Position: Chair, Cuff Size: Adult Regular)  Pulse 80  Temp 98.3  F (36.8  C) (Oral)  Ht 5' 5\" (1.651 m)  Wt 110 lb (49.9 kg)  SpO2 99%  Breastfeeding? No  BMI 18.3 kg/m2  GEN: Alert " Oriented x3 NAD  PSYCH: Mood anxious, affect mood congruent    No results found for this or any previous visit (from the past 24 hour(s)).    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)  (primary encounter diagnosis)  Comment: Check labs today and plan to follow up in nephrology in 3 months   Plan: Basic metabolic panel, Phosphorus            (D50.9) Iron deficiency anemia, unspecified iron deficiency anemia type  Comment: check complete blood counts today  Plan:      Anxiety  Comment; We will send a message to medical therapeutic management (pharm D) to review medications and help to find an appropriate option for you    Per pharmacist - Noted taking Tramadol, but thought that selective serotonin reuptake inhibitor such as sertraline would be best option to help in addition to current medication of mirtazapine.      Follow up in 1 month if not improving.    Disclaimer: This note consists of symbols derived from keyboarding, dictation and/or voice recognition software. As a result, there may be errors in the script that have gone undetected. Please consider this when interpreting information found in this chart.    Man Stovall MD  [phone]

## 2017-07-05 NOTE — PATIENT INSTRUCTIONS
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

## 2017-07-05 NOTE — TELEPHONE ENCOUNTER
Pending Prescriptions:                       Disp   Refills    polyethylene glycol (MIRALAX) powder      510 g  3            Sig: Take 17 g by mouth daily as needed    ferrous sulfate (IRON) 325 (65 FE) MG tab*60 tab*11           Sig: Take 2 tablets (650 mg) by mouth daily (with           breakfast)    amLODIPine (NORVASC) 10 MG tablet         30 tab*11           Sig: Take 1 tablet (10 mg) by mouth daily          Last Written Prescription Date: miralax & amlodipine - historical, 1-10-16 ferrous sulfate  Last Fill Quantity: -, # refills: -  Last Office Visit with [facility], [facility] or [facility] prescribing provider: today! 7-5-17 Wilman (missed opportunity!)     Amlodipine    Potassium   Date Value Ref Range Status   07/10/2016 4.8 3.5 - 5.1 mmol/L Final     Creatinine   Date Value Ref Range Status   07/10/2016 1.28 (A) 0.51 - 0.95 mg/dL Final     BP Readings from Last 3 Encounters:   07/05/17 146/60   06/28/17 153/66   05/08/17 153/76     Ferrous Sulfate    Lab Results   Component Value Date    WBC 8.6 07/05/2017     Lab Results   Component Value Date    RBC 2.98 07/05/2017     Lab Results   Component Value Date    HGB 9.6 07/05/2017     Lab Results   Component Value Date    HCT 29.1 07/05/2017     No components found for: MCT  Lab Results   Component Value Date    MCV 98 07/05/2017     Lab Results   Component Value Date    MCH 32.2 07/05/2017     Lab Results   Component Value Date    MCHC 33.0 07/05/2017     Lab Results   Component Value Date    RDW 13.0 07/05/2017     Lab Results   Component Value Date     07/05/2017     Lab Results   Component Value Date    AST 25 10/23/2015     Lab Results   Component Value Date    ALT 25 10/23/2015     Creatinine   Date Value Ref Range Status   07/10/2016 1.28 (A) 0.51 - 0.95 mg/dL Final         Routing refill request to provider for review/approval because:  Medication is reported/historical    RT Alex (R)

## 2017-07-05 NOTE — MR AVS SNAPSHOT
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)  (primary encounter diagnosis)  Comment: Check labs today and plan to follow up in nephrology in 3 months   Plan: Basic metabolic panel, Phosphorus            (D50.9) Iron deficiency anemia, unspecified iron deficiency anemia type  Comment: check complete blood counts today  Plan:      Anxiety  Comment; We will send a message to medical therapeutic management (pharm D) to review medications and help to find an appropriate option for you          Follow-ups after your visit        Who to contact     If you have questions or need follow up information about today's clinic visit or your schedule please contact [facility] directly at [phone].  Normal or non-critical lab and imaging results will be communicated to you by [employer], letter or phone within 4 business days after the clinic has received the results. If you do not hear from us within 7 days, please contact the clinic through [employer] or phone. If you have a critical or abnormal lab result, we will notify you by phone as soon as possible.  Submit refill requests through [employer] or call your pharmacy and they will forward the refill request to us. Please allow 3 business days for your refill to be completed.          Additional Information About Your Visit        [employer] Information     [employer] lets you send messages to your doctor, view your test results, renew your prescriptions, schedule  "appointments and more. To sign up, go to [email or web address] . Click on \"Log in\" on the left side of the screen, which will take you to the Welcome page. Then click on \"Sign up Now\" on the right side of the page.     You will be asked to enter the access code listed below, as well as some personal information. Please follow the directions to create your username and password.     Your access code is: [email or web address]  Expires: 2017 11:14 AM     Your access code will  in 90 days. If you need help or a new code, please call your [address] clinic or [phone].        Care EveryWhere ID     This is your Care EveryWhere ID. This could be used by other organizations to access your [address] medical records  [id number]        Your Vitals Were     Pulse Temperature Height Pulse Oximetry Breastfeeding? BMI (Body Mass Index)    80 98.3  F (36.8  C) (Oral) 5' 5\" (1.651 m) 99% No 18.3 kg/m2       Blood Pressure from Last 3 Encounters:   17 146/60   17 153/66   17 153/76    Weight from Last 3 Encounters:   17 110 lb (49.9 kg)   17 107 lb (48.5 kg)   17 109 lb (49.4 kg)              We Performed the Following     Basic metabolic panel     CBC with platelets     Phosphorus        Primary Care Provider Office Phone # Fax #    Man Stovall [phone]       [facility] [place] [address]  [place] [address]        Equal Access to Services     KASH FIGUEROA : Hadzeneat Loco, hoa lopez, willis leblanc. So [facility] [phone].    ATENCIÓN: Si habla español, tiene a chiu disposición servicios gratuitos de asistencia lingüística. Meng al [phone].    We comply with applicable federal civil rights laws and [place] laws. We do not discriminate on the basis of race, color, national origin, age, disability sex, sexual orientation or gender identity.            Thank you!     " Thank you for choosing [facility]  for your care. Our goal is always to provide you with excellent care. Hearing back from our patients is one way we can continue to improve our services. Please take a few minutes to complete the written survey that you may receive in the mail after your visit with us. Thank you!             Your Updated Medication List - Protect others around you: Learn how to safely use, store and throw away your medicines at www.disposemymeds.org.          This list is accurate as of: 7/5/17 10:33 AM.  Always use your most recent med list.                   Brand Name Dispense Instructions for use Diagnosis    acetaminophen 500 MG tablet    TYLENOL     Take 2 tablets (1,000 mg) by mouth 3 times daily as needed        AMLODIPINE BESYLATE PO      Take 10 mg by mouth daily        ASPIRIN PO      Take 81 mg by mouth daily        calcium citrate-vitamin D 315-250 MG-UNIT Tabs per tablet    CITRACAL    180 tablet    Take 1 tablet by mouth 2 times daily -Take with food    Osteoporosis, unspecified osteoporosis type, unspecified pathological fracture presence       carBAMazepine 200 MG tablet    TEGretol    120 tablet    Take 100 mg as needed for pain on scale 1-5 and 200 mg as needed  for pain on scale 6-10        docusate sodium 250 MG capsule    COLACE    90 capsule    Take 1 capsule (250 mg) by mouth daily as needed for constipation    Constipation, unspecified constipation type       ferrous sulfate 325 (65 FE) MG tablet    IRON    180 tablet    Take 2 tablets (650 mg) by mouth daily (with breakfast)    Iron deficiency anemia, unspecified iron deficiency       HALLS COUGH DROPS 7 MG Lozg   Generic drug:  menthol     84 lozenge    Take 1 lozenge (7 mg) by mouth every hour as needed for cough        irbesartan-hydrochlorothiazide 300-12.5 MG per tablet    AVALIDE     Take 1 tablet by mouth daily        LASIX 80 MG tablet   Generic drug:  furosemide     30 tablet    Take 60 mg by mouth  daily        latanoprost 0.005 % ophthalmic solution    XALATAN     Place 1 drop into both eyes At Bedtime        MIRALAX PO      Take 1 packet by mouth daily as needed        mirtazapine 7.5 MG Tabs tablet    REMERON    90 tablet    Take 1 tablet (7.5 mg) by mouth At Bedtime    Primary insomnia       traMADol 50 MG tablet    ULTRAM    120 tablet    Take 1 tablet by mouth scheduled BID and every 6 hours PRN for moderate pain.    Nontraumatic shoulder pain, right       VITAMIN D (CHOLECALCIFEROL) PO      Take 1,000 Units by mouth daily

## 2017-07-06 PROBLEM — I10 BENIGN ESSENTIAL HYPERTENSION: Status: ACTIVE | Noted: 2017-07-06

## 2017-07-06 RX ORDER — POLYETHYLENE GLYCOL 3350 17 G/17G
17 POWDER, FOR SOLUTION ORAL DAILY PRN
Qty: 510 G | Refills: 3 | Status: SHIPPED | OUTPATIENT
Start: 2017-07-06 | End: 2019-01-01

## 2017-07-06 RX ORDER — SERTRALINE HYDROCHLORIDE 25 MG/1
25 TABLET, FILM COATED ORAL DAILY
Qty: 30 TABLET | Refills: 11 | Status: SHIPPED | OUTPATIENT
Start: 2017-07-06 | End: 2018-07-05

## 2017-07-06 RX ORDER — AMLODIPINE BESYLATE 10 MG/1
10 TABLET ORAL DAILY
Qty: 30 TABLET | Refills: 11 | Status: SHIPPED | OUTPATIENT
Start: 2017-07-06 | End: 2018-08-02

## 2017-07-06 RX ORDER — FERROUS SULFATE 325(65) MG
325 TABLET ORAL 2 TIMES DAILY WITH MEALS
Qty: 60 TABLET | Refills: 11 | Status: SHIPPED | OUTPATIENT
Start: 2017-07-06 | End: 2018-08-02

## 2017-07-12 DIAGNOSIS — G89.29 CHRONIC LOW BACK PAIN, UNSPECIFIED BACK PAIN LATERALITY, WITH SCIATICA PRESENCE UNSPECIFIED: Primary | ICD-10-CM

## 2017-07-12 DIAGNOSIS — M54.5 CHRONIC LOW BACK PAIN, UNSPECIFIED BACK PAIN LATERALITY, WITH SCIATICA PRESENCE UNSPECIFIED: Primary | ICD-10-CM

## 2017-07-13 RX ORDER — ACETAMINOPHEN 500 MG
1000 TABLET ORAL 3 TIMES DAILY PRN
Qty: 180 TABLET | Refills: 0 | Status: SHIPPED | OUTPATIENT
Start: 2017-07-13 | End: 2017-07-26

## 2017-07-13 NOTE — TELEPHONE ENCOUNTER
Routing refill request to provider for review/approval because:  Medication is reported/historical  Luz ALVARADO RN

## 2017-07-13 NOTE — TELEPHONE ENCOUNTER
Pending Prescriptions:                       Disp   Refills    acetaminophen (TYLENOL) 500 MG tablet     180 ta*0            Sig: Take 2 tablets (1,000 mg) by mouth 3 times daily           as needed          Last Written Prescription Date: 1/19/17  Last Fill Quantity: -,  # refills: -   Last Office Visit with [facility], [facility] or [facility] prescribing provider: 7/5/17 RT Cyn(R)

## 2017-07-26 ENCOUNTER — TELEPHONE (OUTPATIENT)
Dept: FAMILY MEDICINE | Facility: CLINIC | Age: 82
End: 2017-07-26

## 2017-07-26 DIAGNOSIS — M54.5 CHRONIC LOW BACK PAIN, UNSPECIFIED BACK PAIN LATERALITY, WITH SCIATICA PRESENCE UNSPECIFIED: ICD-10-CM

## 2017-07-26 DIAGNOSIS — G89.29 CHRONIC LOW BACK PAIN, UNSPECIFIED BACK PAIN LATERALITY, WITH SCIATICA PRESENCE UNSPECIFIED: ICD-10-CM

## 2017-07-26 RX ORDER — ACETAMINOPHEN 500 MG
1000 TABLET ORAL 3 TIMES DAILY PRN
Qty: 540 TABLET | Refills: 3 | Status: SHIPPED | OUTPATIENT
Start: 2017-07-26 | End: 2018-01-10

## 2017-07-26 NOTE — TELEPHONE ENCOUNTER
Reason for Call:  Other clarify    Detailed comments: acetaminophen (TYLENOL  They have a current order for PRN and the pt said it  Should be scheduled    Phone Number Patient can be reached at: [phone] [facility]    Best Time: any    Can we leave a detailed message on this number? YES    Call taken on 7/26/2017 at 10:42 AM by Karen Mullen

## 2017-07-26 NOTE — TELEPHONE ENCOUNTER
PCP, Pended Acetaminophen script, change to scheduled if appropriate for pt, per facility request.  Samantha Hills RN

## 2017-07-28 DIAGNOSIS — I10 BENIGN ESSENTIAL HYPERTENSION: Primary | ICD-10-CM

## 2017-07-28 NOTE — TELEPHONE ENCOUNTER
Pending Prescriptions:                       Disp   Refills    ASPIRIN LOW DOSE 81 MG chewable tablet [P*30 tab*10           Si TAB BY MOUTH DAILY (DX:ANTICOAGULATION)            Last Office Visit with G, P or [facility] prescribing provider: 17  Future Office visit:       Routing refill request to provider for review/approval because:  Medication is reported/historical

## 2017-07-31 RX ORDER — ASPIRIN 81 MG
TABLET,CHEWABLE ORAL
Qty: 30 TABLET | Refills: 11 | Status: SHIPPED | OUTPATIENT
Start: 2017-07-31 | End: 2018-08-02

## 2017-09-15 ENCOUNTER — TRANSFERRED RECORDS (OUTPATIENT)
Dept: HEALTH INFORMATION MANAGEMENT | Facility: CLINIC | Age: 82
End: 2017-09-15

## 2017-10-03 DIAGNOSIS — M25.511 NONTRAUMATIC SHOULDER PAIN, RIGHT: ICD-10-CM

## 2017-10-03 NOTE — TELEPHONE ENCOUNTER
Pending Prescriptions:                       Disp   Refills    traMADol (ULTRAM) 50 MG tablet            120 ta*3            Sig: Take 1 tablet by mouth scheduled BID and every 6           hours PRN for moderate pain.        Last Written Prescription Date:  4/6/17  Last Fill Quantity: 120,   # refills: 3  Last Office Visit with [facility], [facility] or [facility] prescribing provider: 7/5/17 Wilman  Future Office visit:       Routing refill request to provider for review/approval because:  Drug not on the [facility], [facility] or [facility] refill protocol or controlled substance    RT Radha(R)

## 2017-10-04 RX ORDER — TRAMADOL HYDROCHLORIDE 50 MG/1
TABLET ORAL
Qty: 120 TABLET | Refills: 3 | Status: SHIPPED | OUTPATIENT
Start: 2017-10-04 | End: 2018-01-29

## 2017-11-30 ENCOUNTER — APPOINTMENT (OUTPATIENT)
Dept: CT IMAGING | Facility: CLINIC | Age: 82
End: 2017-11-30
Attending: EMERGENCY MEDICINE
Payer: MEDICARE

## 2017-11-30 ENCOUNTER — HOSPITAL ENCOUNTER (EMERGENCY)
Facility: CLINIC | Age: 82
Discharge: HOME | End: 2017-11-30
Attending: EMERGENCY MEDICINE | Admitting: EMERGENCY MEDICINE
Payer: MEDICARE

## 2017-11-30 ENCOUNTER — APPOINTMENT (OUTPATIENT)
Dept: GENERAL RADIOLOGY | Facility: CLINIC | Age: 82
End: 2017-11-30
Attending: EMERGENCY MEDICINE
Payer: MEDICARE

## 2017-11-30 VITALS
SYSTOLIC BLOOD PRESSURE: 149 MMHG | WEIGHT: 103 LBS | OXYGEN SATURATION: 95 % | HEART RATE: 67 BPM | TEMPERATURE: 98.3 F | RESPIRATION RATE: 16 BRPM | BODY MASS INDEX: 18.95 KG/M2 | HEIGHT: 62 IN | DIASTOLIC BLOOD PRESSURE: 71 MMHG
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
## 2017-11-30 PROCEDURE — 73562 X-RAY EXAM OF KNEE 3: CPT | Mod: RT

## 2017-11-30 PROCEDURE — 99284 EMERGENCY DEPT VISIT MOD MDM: CPT | Mod: 25

## 2017-11-30 PROCEDURE — 70450 CT HEAD/BRAIN W/O DYE: CPT

## 2017-11-30 ASSESSMENT — ENCOUNTER SYMPTOMS
HEADACHES: 1
WEAKNESS: 0
NUMBNESS: 0
ARTHRALGIAS: 1
WOUND: 1

## 2017-11-30 NOTE — ED AVS SNAPSHOT
Emergency Department    [address] [address]    Phone:  [phone]    Fax:  [phone]                                       Kalli Brown   MRN: [id number]    Department:   Emergency Department   Date of Visit:  11/30/2017           Patient Information     Date Of Birth          2/26/1929        Your diagnoses for this visit were:     Fall from wheelchair, initial encounter        You were seen by Gregorio Mcgraw MD.      Follow-up Information     Follow up with Man Stovall MD In 1 week.    Specialty:  Internal Medicine    Contact information:    [address] [facility] [place]  [facility] [address]  [phone]          Follow up with  Emergency Department.    Specialty:  EMERGENCY MEDICINE    Why:  As needed, If symptoms worsen    Contact information:    [address] [facility] [id number] [phone]        Discharge Instructions         Mechanical Fall  You have had a fall today. It appears that the cause is what we call mechanical. That means that you slipped, tripped or lost your balance. If your fall had been due to fainting or a seizure, other tests might be required.  It is normal to feel sore and tight in your muscles and back the next day, and not just the muscles you injured at first. Remember, all the parts of your body are connected, so while initially one area hurts, the next day another may hurt. Also, when you injure yourself, it causes inflammation, which then causes the muscles to tighten up and hurt more. After the initial worsening, it should gradually improve over the next few days. However, report more severe pain.  Even without a definite head injury, you can still get a concussion from your head suddenly jerking forward, backward or sideways when falling. Concussions and even bleeding can still occur, especially if you have had a recent injury or take blood thinner medicine. It is not unusual to have a mild headache  and feel tired and even nauseous or dizzy.   Home care  1. Rest today and resume your normal activities when you are feeling back to normal.  2. If you were injured during the fall, follow the advice from your doctor regarding care of your injury.  3. At first, do not try to stretch out the sore spots. If there is a strain, stretching may make it worse.  Massage may help relax the muscles without stretching them.  4. You can use an ice pack or cold compress on and off to the sore spots 10 to 20 at a time, as often as you feel comfortable. This may help reduce the inflammation, swelling and pain.  5. If you have any scrapes or abrasions, they usually heal within 10 days. It is important to keep the abrasions clean while they initially start to heal. However, an infection may occur even with proper care, so watch for early signs of infection.  Medications    Talk to your doctor before taking new medicines, especially if you have other medical problems or are taking other medicines.    If you need anything for pain, you can take acetaminophen or ibuprofen, unless you were given a different pain medicine to use. Talk with your doctor before using these medicines if you have chronic liver or kidney disease, or ever had a stomach ulcer or gastrointestinal bleeding, or are taking blood thinner medicines.    Be careful if you are given prescription pain medicines, narcotics, or medicine for muscle spasm. They can make you sleepy, dizzy and can affect your coordination, reflexes and judgment. Do not drive or do work where you can injure yourself when taking them.  Fall prevention    Was there anything that caused your fall that can be fixed, removed, or replaced?    Make your home safe by keeping walkways clear of objects you may trip over.    Use non-slip pads under rugs. Don't use small area rugs or throw rugs.    Do not walk in poorly lit areas.    Do not stand on chairs or wobbly ladders.    Use caution when reaching  overhead or looking upward. This position can cause a loss of balance.    Be sure your shoes fit properly, have non-slip bottoms and are in good condition.    Be cautious when going up and down curbs, and walking on uneven sidewalks.    If your balance is poor, consider using a cane or walker.    Stay as active as you can. Balance, flexibility, strength, and endurance all come from exercise. They all play a role in preventing falls.    If you have pets, know where they are before you stand up or walk so you don't trip over them.    Limit alcohol intake. Alcohol can cause balance problems and increase the risk of falls.    Use night lights.  Follow-up  Follow up with your doctor or as advised by our staff. If X-rays or CT scans were done, you will be notified if there is a change in the reading, especially if it affects treatment.  Call 911  Call 911 if any of these occur:    Trouble breathing    Confused or difficulty arousing    Fainting or loss of consciousness    Rapid or very slow heart rate    Seizure    Difficulty with speech or vision, weakness of an arm or leg    Difficulty walking or talking, loss of balance, numbness or weakness in one side of your body, facial droop  When to seek medical advice  Call your healthcare provider right away if any of these occur:    Repeated mechanical falls, or unexplained falls    Dizziness    Severe headache    Blood in vomit, stools (black or red color)  Date Last Reviewed: 11/5/2015 2000-2017 [employer]. [address] [place], [place] [address]. All rights reserved. This information is not intended as a substitute for professional medical care. Always follow your healthcare professional's instructions.        Abrasions  Abrasions are skin scrapes. Their treatment depends on how large and deep the abrasion is.  Home care  You may be prescribed an antibiotic cream or ointment to apply to the wound. This helps prevent infection. Follow instructions when  using this medicine.  General care    To care for the abrasion, do the following each day for as long as directed by your healthcare provider.    If you were given a bandage, change it once a day. If your bandage sticks to the wound, soak it in warm water until it loosens.    Wash the area with soap and warm water. You may do this in a sink or under a tub faucet or shower. Rinse off the soap. Then pat the area dry with a clean towel.    If antibiotic ointment or cream was prescribed, reapply it to the wound as directed. Cover the wound with a fresh nonstick bandage. If the bandage becomes wet or dirty, change it as soon as possible.    Some antibiotic ointments or cream can cause an allergic reaction or dermatitis. This may cause redness, itching and or hives. If this occurs, stop using the ointment immediately and wash off any remaining ointment. You may need to take some allergy medicine to relieve symptoms.    You may use acetaminophen or ibuprofen to control pain unless another pain medicine was prescribed. Talk with your healthcare provider before using these medicines if you have chronic liver or kidney disease or ever had a stomach ulcer or GI bleeding. Don t use ibuprofen in children younger than six months old.    Most skin wounds heal within 10 days. But an infection may occur even with treatment. So it s important to watch the wound for signs of infection as listed below.  Follow-up care  Follow up with your healthcare provider, or as advised.  When to seek medical advice  Call your healthcare provider right away if any of these occur:    Fever of 100.4 F (38 C) or higher, or as directed by your healthcare provider    Increasing pain, redness, swelling, or drainage from the wound    Bleeding from the wound that does not stop after a few minutes of steady, firm pressure    Decreased ability to move any body part near the wound  Date Last Reviewed: 3/3/2017    8062-4594 [employer] [address]  [place] [address]. All rights reserved. This information is not intended as a substitute for professional medical care. Always follow your healthcare professional's instructions.              24 Hour Appointment Hotline       To make an appointment at any [facility], call [phone] ([phone]). If you don't have a family doctor or clinic, we will help you find one. [facility] are conveniently located to serve the needs of you and your family.             Review of your medicines      Our records show that you are taking the medicines listed below. If these are incorrect, please call your family doctor or clinic.        Dose / Directions Last dose taken    acetaminophen 500 MG tablet   Commonly known as:  TYLENOL   Dose:  1000 mg   Quantity:  540 tablet        Take 2 tablets (1,000 mg) by mouth 3 times daily as needed   Refills:  3        amLODIPine 10 MG tablet   Commonly known as:  NORVASC   Dose:  10 mg   Quantity:  30 tablet        Take 1 tablet (10 mg) by mouth daily   Refills:  11        ASPIRIN LOW DOSE 81 MG chewable tablet   Quantity:  30 tablet   Generic drug:  aspirin        1 TAB BY MOUTH DAILY (DX:ANTICOAGULATION)   Refills:  11        calcium citrate-vitamin D 315-250 MG-UNIT Tabs per tablet   Commonly known as:  CITRACAL   Dose:  1 tablet   Quantity:  180 tablet        Take 1 tablet by mouth 2 times daily -Take with food   Refills:  3        carBAMazepine 200 MG tablet   Commonly known as:  TEGretol   Quantity:  120 tablet        Take 100 mg as needed for pain on scale 1-5 and 200 mg as needed  for pain on scale 6-10   Refills:  0        docusate sodium 250 MG capsule   Commonly known as:  COLACE   Dose:  250 mg   Quantity:  90 capsule        Take 1 capsule (250 mg) by mouth daily as needed for constipation   Refills:  3        ferrous sulfate 325 (65 FE) MG tablet   Commonly known as:  IRON   Dose:  325 mg   Quantity:  60 tablet        Take 1 tablet (325 mg) by  mouth 2 times daily (with meals)   Refills:  11        HALLS COUGH DROPS 7 MG Lozg   Dose:  1 lozenge   Quantity:  84 lozenge   Generic drug:  menthol        Take 1 lozenge (7 mg) by mouth every hour as needed for cough   Refills:  0        irbesartan-hydrochlorothiazide 300-12.5 MG per tablet   Commonly known as:  AVALIDE   Dose:  1 tablet        Take 1 tablet by mouth daily   Refills:  0        LASIX 80 MG tablet   Dose:  60 mg   Quantity:  30 tablet   Generic drug:  furosemide        Take 60 mg by mouth daily   Refills:  0        latanoprost 0.005 % ophthalmic solution   Commonly known as:  XALATAN   Dose:  1 drop        Place 1 drop into both eyes At Bedtime   Refills:  0        mirtazapine 7.5 MG Tabs tablet   Commonly known as:  REMERON   Dose:  7.5 mg   Quantity:  90 tablet        Take 1 tablet (7.5 mg) by mouth At Bedtime   Refills:  3        polyethylene glycol powder   Commonly known as:  MIRALAX   Dose:  17 g   Quantity:  510 g        Take 17 g by mouth daily as needed   Refills:  3        sertraline 25 MG tablet   Commonly known as:  ZOLOFT   Dose:  25 mg   Quantity:  30 tablet        Take 1 tablet (25 mg) by mouth daily   Refills:  11        traMADol 50 MG tablet   Commonly known as:  ULTRAM   Quantity:  120 tablet        Take 1 tablet by mouth scheduled BID and every 6 hours PRN for moderate pain.   Refills:  3        VITAMIN D (CHOLECALCIFEROL) PO   Dose:  1000 Units        Take 1,000 Units by mouth daily   Refills:  0                Procedures and tests performed during your visit     Head CT w/o contrast    Knee XR, 3 views, right      Orders Needing Specimen Collection     None      Pending Results     Date and Time Order Name Status Description    11/30/2017 1238 Knee XR, 3 views, right Preliminary             Pending Culture Results     No orders found from 11/28/2017 to 12/1/2017.            Pending Results Instructions     If you had any lab results that were not finalized at the time of your  Discharge, you can call the ED Lab Result RN at [phone]. You will be contacted by this team for any positive Lab results or changes in treatment. The nurses are available 7 days a week from 10A to 6:30P.  You can leave a message 24 hours per day and they will return your call.        Test Results From Your Hospital Stay        11/30/2017  1:32 PM      Narrative     RIGHT KNEE THREE VIEWS  11/30/2017 1:03 PM    HISTORY:  Fall from wheelchair, knee pain.     COMPARISON:  None.        Impression     IMPRESSION:  Osteopenia. Otherwise negative.          11/30/2017  1:56 PM      Narrative     CT SCAN OF THE HEAD WITHOUT CONTRAST   11/30/2017 1:22 PM     HISTORY: Fall, struck forehead.    TECHNIQUE:  Axial images of the head and coronal reformations without  IV contrast material. Radiation dose for this scan was reduced using  automated exposure control, adjustment of the mA and/or kV according  to patient size, or iterative reconstruction technique.    COMPARISON: 7/20/2014    FINDINGS:  There is generalized atrophy of the brain.  There is low  attenuation in the white matter of the cerebral hemispheres consistent  with sequelae of small vessel ischemic disease. There is no evidence  of intracranial hemorrhage, mass, acute infarct or anomaly.     The visualized portions of the sinuses and mastoids appear normal.  There is right frontal scalp soft tissue swelling with no underlying  fracture.         Impression     IMPRESSION:   1. Right frontal scalp soft tissue swelling. No evidence of fracture  or intracranial trauma.  2. Atrophy of the brain.  White matter changes consistent with  sequelae of small vessel ischemic disease. This is unchanged.      AMANDA CORDOVA MD                Clinical Quality Measure: Blood Pressure Screening     Your blood pressure was checked while you were in the emergency department today. The last reading we obtained was  BP: 167/75 . Please read the guidelines below about what these  "numbers mean and what you should do about them.  If your systolic blood pressure (the top number) is less than 120 and your diastolic blood pressure (the bottom number) is less than 80, then your blood pressure is normal. There is nothing more that you need to do about it.  If your systolic blood pressure (the top number) is 120-139 or your diastolic blood pressure (the bottom number) is 80-89, your blood pressure may be higher than it should be. You should have your blood pressure rechecked within a year by a primary care provider.  If your systolic blood pressure (the top number) is 140 or greater or your diastolic blood pressure (the bottom number) is 90 or greater, you may have high blood pressure. High blood pressure is treatable, but if left untreated over time it can put you at risk for heart attack, stroke, or kidney failure. You should have your blood pressure rechecked by a primary care provider within the next 4 weeks.  If your provider in the emergency department today gave you specific instructions to follow-up with your doctor or provider even sooner than that, you should follow that instruction and not wait for up to 4 weeks for your follow-up visit.        Thank you for choosing [facility]       Thank you for choosing [facility] for your care. Our goal is always to provide you with excellent care. Hearing back from our patients is one way we can continue to improve our services. Please take a few minutes to complete the written survey that you may receive in the mail after you visit with us. Thank you!        [employer] Information     Shaka lets you send messages to your doctor, view your test results, renew your prescriptions, schedule appointments and more. To sign up, go to [email or web address] . Click on \"Log in\" on the left side of the screen, which will take you to the Welcome page. Then click on \"Sign up Now\" on the right side of the page.     You will be asked to enter the access code listed " below, as well as some personal information. Please follow the directions to create your username and password.     Your access code is: [email or web address]  Expires: 2018  2:04 PM     Your access code will  in 90 days. If you need help or a new code, please call your [facility] clinic or [phone].        Care EveryWhere ID     This is your Care EveryWhere ID. This could be used by other organizations to access your [facility] medical records  [id number]        Equal Access to Services     KASH FIGUEROA : Byron meek Sosherwin, wasally luqadaha, qakaveh kaalmaene cantu, willis georges . So [facility] [phone].    ATENCIÓN: Si habla español, tiene a chiu disposición servicios gratuitos de asistencia lingüística. Llame al [phone].    We comply with applicable federal civil rights laws and [place] laws. We do not discriminate on the basis of race, color, national origin, age, disability, sex, sexual orientation, or gender identity.            After Visit Summary       This is your record. Keep this with you and show to your community pharmacist(s) and doctor(s) at your next visit.

## 2017-11-30 NOTE — ED PROVIDER NOTES
"  History     Chief Complaint:  Fall    HPI   Kalli Brown is a 88 year old female who presents for evaluation following a fall. The patient reports that 1 hour prior to arrival here in the ED she fell forward out of her wheelchair onto concrete anahi. She did not lose consciousness during this incident. Upon arrival here in the ED she has an abrasion with swelling over her right forehead, pain in her right knee, and an abrasion to her left leg. She denies any numbness, weakness, or other injuries at this time. The patient is not currently anticoagulated.     Allergies:  Atorvastatin    Medications:    Tramadol  Aspirin  Zoloft  Miralax  Iron  Norvasc  Avalide  Tegretol  Remeron  Colace  Lasix  Xalatan    Past Medical History:    Anemia  Deafness  Chronic dislocated shoulders  Edema  Glaucoma  Hypertension  Multiple myeloma  Proteinuria  Trigeminal neuralgia    Past Surgical History:    Eye surgery  Gyn surgery  Orthopedic surgery  Phacoemulsification clear cornea with standard intraocular lens implant     Family History:    The patient denies any relevant family medical history.    Social History:  The patient was accompanied to the ED by her son.  Smoking Status: No  Smokeless Tobacco: No  Alcohol Use: No  Marital Status:       Review of Systems   Musculoskeletal: Positive for arthralgias.   Skin: Positive for wound.   Neurological: Positive for headaches. Negative for syncope, weakness and numbness.   All other systems reviewed and are negative.    Physical Exam   Vitals:  Patient Vitals for the past 24 hrs:   BP Temp Temp src Heart Rate Resp SpO2 Height Weight   11/30/17 1216 167/75 98.3  F (36.8  C) Oral 85 12 96 % 1.575 m (5' 2\") 46.7 kg (103 lb)     Physical Exam  General: Appears well-developed and well-nourished.   Head:  Ecchymosis and swelling to right forehead.  No bony deformities.    Mouth/Throat: Oropharynx is clear and moist.   Eyes: Conjunctivae are normal. Pupils are equal, " round, and reactive to light.   Neck: Normal range of motion. No midline tenderness.   CV: Normal rate and regular rhythm.    Resp: Effort normal and breath sounds normal. No respiratory distress.   GI: Soft. There is no tenderness or guarding.  Normal bowel sounds.    MSK: +ecchymosis and swelling to anterior right knee.  Normal range of motion.  No bony deformities.    Neuro: The patient is alert and oriented.  Strength in upper/lower extremities normal and symmetrical.   Sensation normal. Speech normal.  GCS 15  Skin: Skin is warm and dry. No rash noted.   Psych: normal mood and affect. behavior is normal.       Emergency Department Course     Imaging:  Radiology findings were communicated with the patient who voiced understanding of the findings.  Knee XR, 3 views, right:  Osteopenia. Otherwise negative.   Per Radiologist.     Head CT w/o contrast:  1. Right frontal scalp soft tissue swelling. No evidence of fracture or intracranial trauma.  2. Atrophy of the brain.  White matter changes consistent with sequelae of small vessel ischemic disease. This is unchanged.    Per Radiologist.     Emergency Department Course:  Nursing notes and vitals reviewed.  I performed an exam of the patient as documented above.   The patient was sent for a XR and CT while in the emergency department, results above.   1353 I updated the patient on their plan of care.    1356 I discussed the treatment plan with the patient. They expressed understanding of this plan and consented to discharge. They will be discharged home with instructions for care and follow up. In addition, the patient will return to the emergency department if their symptoms persist, worsen, if new symptoms arise or if there is any concern.  All questions were answered.    Impression & Plan      Medical Decision Making:  Kalli Brown is a 88 year old female who presents for evaluation of a fall.  This was clearly a mechanical fall, not syncope.  There  were no prodromal symptoms so I doubt stroke, cardiac arrhythmia or other serious etiology.  Detailed exam shows swelling to right knee and forehead.  Based on this I did not do basic blood work and urinalysis. Both x ray of the right knee and head CT were negative for any acute abnormalities. Based on this I would send home for outpatient management. I would not do workup for syncope, ACS, PE at this point.  I doubt serious underlying fractures, intracerebral issues, spinal fractures.      Diagnosis:    ICD-10-CM    1. Fall from wheelchair, initial encounter W05.0XXA      Disposition:   Discharge    Scribe Disclosure:  I, Julio Wilbert, am serving as a scribe at 12:41 PM on 11/30/2017 to document services personally performed by Gregorio Mcgraw MD, based on my observations and the provider's statements to me.    11/30/2017    EMERGENCY DEPARTMENT       Gregorio Mcgraw MD  11/30/17 5913

## 2017-11-30 NOTE — DISCHARGE INSTRUCTIONS
Mechanical Fall  You have had a fall today. It appears that the cause is what we call mechanical. That means that you slipped, tripped or lost your balance. If your fall had been due to fainting or a seizure, other tests might be required.  It is normal to feel sore and tight in your muscles and back the next day, and not just the muscles you injured at first. Remember, all the parts of your body are connected, so while initially one area hurts, the next day another may hurt. Also, when you injure yourself, it causes inflammation, which then causes the muscles to tighten up and hurt more. After the initial worsening, it should gradually improve over the next few days. However, report more severe pain.  Even without a definite head injury, you can still get a concussion from your head suddenly jerking forward, backward or sideways when falling. Concussions and even bleeding can still occur, especially if you have had a recent injury or take blood thinner medicine. It is not unusual to have a mild headache and feel tired and even nauseous or dizzy.   Home care  1. Rest today and resume your normal activities when you are feeling back to normal.  2. If you were injured during the fall, follow the advice from your doctor regarding care of your injury.  3. At first, do not try to stretch out the sore spots. If there is a strain, stretching may make it worse.  Massage may help relax the muscles without stretching them.  4. You can use an ice pack or cold compress on and off to the sore spots 10 to 20 at a time, as often as you feel comfortable. This may help reduce the inflammation, swelling and pain.  5. If you have any scrapes or abrasions, they usually heal within 10 days. It is important to keep the abrasions clean while they initially start to heal. However, an infection may occur even with proper care, so watch for early signs of infection.  Medications    Talk to your doctor before taking new medicines,  especially if you have other medical problems or are taking other medicines.    If you need anything for pain, you can take acetaminophen or ibuprofen, unless you were given a different pain medicine to use. Talk with your doctor before using these medicines if you have chronic liver or kidney disease, or ever had a stomach ulcer or gastrointestinal bleeding, or are taking blood thinner medicines.    Be careful if you are given prescription pain medicines, narcotics, or medicine for muscle spasm. They can make you sleepy, dizzy and can affect your coordination, reflexes and judgment. Do not drive or do work where you can injure yourself when taking them.  Fall prevention    Was there anything that caused your fall that can be fixed, removed, or replaced?    Make your home safe by keeping walkways clear of objects you may trip over.    Use non-slip pads under rugs. Don't use small area rugs or throw rugs.    Do not walk in poorly lit areas.    Do not stand on chairs or wobbly ladders.    Use caution when reaching overhead or looking upward. This position can cause a loss of balance.    Be sure your shoes fit properly, have non-slip bottoms and are in good condition.    Be cautious when going up and down curbs, and walking on uneven sidewalks.    If your balance is poor, consider using a cane or walker.    Stay as active as you can. Balance, flexibility, strength, and endurance all come from exercise. They all play a role in preventing falls.    If you have pets, know where they are before you stand up or walk so you don't trip over them.    Limit alcohol intake. Alcohol can cause balance problems and increase the risk of falls.    Use night lights.  Follow-up  Follow up with your doctor or as advised by our staff. If X-rays or CT scans were done, you will be notified if there is a change in the reading, especially if it affects treatment.  Call 911  Call 911 if any of these occur:    Trouble breathing    Confused or  difficulty arousing    Fainting or loss of consciousness    Rapid or very slow heart rate    Seizure    Difficulty with speech or vision, weakness of an arm or leg    Difficulty walking or talking, loss of balance, numbness or weakness in one side of your body, facial droop  When to seek medical advice  Call your healthcare provider right away if any of these occur:    Repeated mechanical falls, or unexplained falls    Dizziness    Severe headache    Blood in vomit, stools (black or red color)  Date Last Reviewed: 11/5/2015 2000-2017 [employer]. [address] [place] [id number]. All rights reserved. This information is not intended as a substitute for professional medical care. Always follow your healthcare professional's instructions.        Abrasions  Abrasions are skin scrapes. Their treatment depends on how large and deep the abrasion is.  Home care  You may be prescribed an antibiotic cream or ointment to apply to the wound. This helps prevent infection. Follow instructions when using this medicine.  General care    To care for the abrasion, do the following each day for as long as directed by your healthcare provider.    If you were given a bandage, change it once a day. If your bandage sticks to the wound, soak it in warm water until it loosens.    Wash the area with soap and warm water. You may do this in a sink or under a tub faucet or shower. Rinse off the soap. Then pat the area dry with a clean towel.    If antibiotic ointment or cream was prescribed, reapply it to the wound as directed. Cover the wound with a fresh nonstick bandage. If the bandage becomes wet or dirty, change it as soon as possible.    Some antibiotic ointments or cream can cause an allergic reaction or dermatitis. This may cause redness, itching and or hives. If this occurs, stop using the ointment immediately and wash off any remaining ointment. You may need to take some allergy medicine to relieve  symptoms.    You may use acetaminophen or ibuprofen to control pain unless another pain medicine was prescribed. Talk with your healthcare provider before using these medicines if you have chronic liver or kidney disease or ever had a stomach ulcer or GI bleeding. Don t use ibuprofen in children younger than six months old.    Most skin wounds heal within 10 days. But an infection may occur even with treatment. So it s important to watch the wound for signs of infection as listed below.  Follow-up care  Follow up with your healthcare provider, or as advised.  When to seek medical advice  Call your healthcare provider right away if any of these occur:    Fever of 100.4 F (38 C) or higher, or as directed by your healthcare provider    Increasing pain, redness, swelling, or drainage from the wound    Bleeding from the wound that does not stop after a few minutes of steady, firm pressure    Decreased ability to move any body part near the wound  Date Last Reviewed: 3/3/2017    [id number] [employer]. [address] [place] [address], [place] [address]. All rights reserved. This information is not intended as a substitute for professional medical care. Always follow your healthcare professional's instructions.

## 2017-11-30 NOTE — ED AVS SNAPSHOT
Emergency Department    [address] [place] [address]    Phone:  [phone]    Fax:  [phone]                                       Kalli Brown   MRN: [id number]    Department:   Emergency Department   Date of Visit:  11/30/2017           After Visit Summary Signature Page     I have received my discharge instructions, and my questions have been answered. I have discussed any challenges I see with this plan with the nurse or doctor.    ..........................................................................................................................................  Patient/Patient Representative Signature      ..........................................................................................................................................  Patient Representative Print Name and Relationship to Patient    ..................................................               ................................................  Date                                            Time    ..........................................................................................................................................  Reviewed by Signature/Title    ...................................................              ..............................................  Date                                                            Time

## 2018-01-08 ENCOUNTER — MEDICAL CORRESPONDENCE (OUTPATIENT)
Dept: HEALTH INFORMATION MANAGEMENT | Facility: CLINIC | Age: 83
End: 2018-01-08

## 2018-01-10 DIAGNOSIS — M54.5 CHRONIC LOW BACK PAIN, UNSPECIFIED BACK PAIN LATERALITY, WITH SCIATICA PRESENCE UNSPECIFIED: ICD-10-CM

## 2018-01-10 DIAGNOSIS — G89.29 CHRONIC LOW BACK PAIN, UNSPECIFIED BACK PAIN LATERALITY, WITH SCIATICA PRESENCE UNSPECIFIED: ICD-10-CM

## 2018-01-11 RX ORDER — ACETAMINOPHEN 500 MG
1000 TABLET ORAL 3 TIMES DAILY PRN
Qty: 540 TABLET | Refills: 0 | Status: SHIPPED | OUTPATIENT
Start: 2018-01-11 | End: 2018-01-29

## 2018-01-11 NOTE — TELEPHONE ENCOUNTER
"Prescription approved per [facility].    Pt has upcoming OV scheduled.     Tammi BURROUGHS RN    Requested Prescriptions   Signed Prescriptions Disp Refills     acetaminophen (TYLENOL) 500 MG tablet 540 tablet 0     Sig: Take 2 tablets (1,000 mg) by mouth 3 times daily as needed    Analgesics (Non-Narcotic Tylenol and ASA Only) Passed    1/10/2018  8:18 PM       Passed - Recent or future visit with authorizing provider's specialty    Patient had office visit in the last year or has a visit in the next 30 days with authorizing provider.  See \"Patient Info\" tab in inbasket, or \"Choose Columns\" in Meds & Orders section of the refill encounter.              Passed - Patient is 7 months old or older    If patient is a peds patient of the age 7 mos -12 years, ok to refill using weight-based dosing.     If >3g daily and/or sig is not \"prn\", check for liver enzymes. If normal in the last year, ok to refill.  If not, refer to the provider.              "

## 2018-01-11 NOTE — TELEPHONE ENCOUNTER
"Requested Prescriptions   Pending Prescriptions Disp Refills     acetaminophen (TYLENOL) 500 MG tablet  Last Written Prescription Date:  7/26/17  Last Fill Quantity: 540 tablet,  # refills: 3   Last Office Visit with G, P or [facility] prescribing provider:  7/5/17 Wilman   Future Office Visit:    Next 5 appointments (look out 90 days)     Jan 22, 2018 10:00 AM CST   [facility] St[facility]all [facility]    [address] [facility] [address]   [phone]                540 tablet 3     Sig: Take 2 tablets (1,000 mg) by mouth 3 times daily as needed    Analgesics (Non-Narcotic Tylenol and ASA Only) Passed    1/10/2018  8:16 PM       Passed - Recent or future visit with authorizing provider's specialty    Patient had office visit in the last year or has a visit in the next 30 days with authorizing provider.  See \"Patient Info\" tab in inbasket, or \"Choose Columns\" in Meds & Orders section of the refill encounter.        Passed - Patient is 7 months old or older    If patient is a peds patient of the age 7 mos -12 years, ok to refill using weight-based dosing.     If >3g daily and/or sig is not \"prn\", check for liver enzymes. If normal in the last year, ok to refill.  If not, refer to the provider.            "

## 2018-01-29 ENCOUNTER — OFFICE VISIT (OUTPATIENT)
Dept: FAMILY MEDICINE | Facility: CLINIC | Age: 83
End: 2018-01-29
Payer: COMMERCIAL

## 2018-01-29 VITALS
OXYGEN SATURATION: 96 % | TEMPERATURE: 98.9 F | SYSTOLIC BLOOD PRESSURE: 129 MMHG | WEIGHT: 100 LBS | HEIGHT: 62 IN | HEART RATE: 76 BPM | DIASTOLIC BLOOD PRESSURE: 68 MMHG | BODY MASS INDEX: 18.4 KG/M2

## 2018-01-29 DIAGNOSIS — R29.898 LEG WEAKNESS, BILATERAL: ICD-10-CM

## 2018-01-29 DIAGNOSIS — Z23 NEED FOR PROPHYLACTIC VACCINATION AND INOCULATION AGAINST INFLUENZA: ICD-10-CM

## 2018-01-29 DIAGNOSIS — G50.0 TRIGEMINAL NEURALGIA: ICD-10-CM

## 2018-01-29 DIAGNOSIS — G89.29 CHRONIC LOW BACK PAIN, UNSPECIFIED BACK PAIN LATERALITY, WITH SCIATICA PRESENCE UNSPECIFIED: ICD-10-CM

## 2018-01-29 DIAGNOSIS — N18.30 ANEMIA OF CHRONIC KIDNEY FAILURE, STAGE 3 (MODERATE) (H): ICD-10-CM

## 2018-01-29 DIAGNOSIS — D63.1 ANEMIA OF CHRONIC KIDNEY FAILURE, STAGE 3 (MODERATE) (H): ICD-10-CM

## 2018-01-29 DIAGNOSIS — M54.5 CHRONIC LOW BACK PAIN, UNSPECIFIED BACK PAIN LATERALITY, WITH SCIATICA PRESENCE UNSPECIFIED: ICD-10-CM

## 2018-01-29 DIAGNOSIS — N18.30 CKD (CHRONIC KIDNEY DISEASE) STAGE 3, GFR 30-59 ML/MIN (H): ICD-10-CM
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
## 2018-01-29 DIAGNOSIS — N05.8 LIGHT CHAIN NEPHROPATHY: ICD-10-CM

## 2018-01-29 PROCEDURE — 99214 OFFICE O/P EST MOD 30 MIN: CPT | Performed by: INTERNAL MEDICINE

## 2018-01-29 RX ORDER — FUROSEMIDE 80 MG
80 TABLET ORAL DAILY
Qty: 30 TABLET | COMMUNITY
Start: 2018-01-29 | End: 2018-07-05

## 2018-01-29 RX ORDER — ACETAMINOPHEN 500 MG
1000 TABLET ORAL 3 TIMES DAILY
Qty: 540 TABLET | Refills: 0 | COMMUNITY
Start: 2018-01-29 | End: 2018-07-03

## 2018-01-29 RX ORDER — CARBAMAZEPINE 200 MG/1
200 TABLET ORAL 2 TIMES DAILY
Qty: 120 TABLET | Refills: 0 | COMMUNITY
Start: 2018-01-29 | End: 2018-07-02

## 2018-01-29 NOTE — PROGRESS NOTES
"[facility]  CLINIC PROGRESS NOTE    Subjective:  Recent Fall   Kalli Brown was recently seen in the emergency department for a fall from her wheelchair.  She did not lose consciousness.  No fractures were seen  Chronic kidney disease    She did recently follow up in nephrology this past fall and no changes in her medications were recommended as she was doing quite well at that time.  Anemia of chronic kidney disease   She was recommended to stop aranesp and to continue her daily iron.          Past medical history, medications, allergies, social history, family history reviewed and updated in [facility] as of 1/29/2018 .    ROS  CONSTITUTIONAL: no fatigue, no unexpected change in weight  SKIN: no worrisome rashes, no worrisome moles, no worrisome lesions  EYES: no acute vision problems or changes - will have follow up in ophthalmology in March  ENT: no ear problems, no mouth problems, no throat problems  RESP: no significant cough, no shortness of breath  CV: no chest pain, no palpitations, no new or worsening peripheral edema - recent increase in lasix   GI: no nausea, no vomiting, no constipation, previous diarrhea - taking prune juice  : no frequency, no dysuria, no hematuria  MS: recent fall this past autumn.  Her son notes that she may have had difficulty transferring from wheel chair back to bed  PSYCHIATRIC: slight worsening mood and depression       Objective:  Vitals  /68 (BP Location: Right arm, Patient Position: Chair, Cuff Size: Adult Small)  Pulse 76  Temp 98.9  F (37.2  C) (Tympanic)  Ht 5' 2\" (1.575 m)  Wt 100 lb (45.4 kg)  SpO2 96%  BMI 18.29 kg/m2  GEN: Alert Oriented x3 NAD  HEENT: Atraumatic, normocephalic, neck supple,   CV: RRR no murmurs or rubs  PULM: CTA no wheezes or crackles  ABD: Soft, nontender nondistended   SKIN: No visible skin lesion or ulcerations  EXT: 2+ dorsal pedis pulses, no edema bilateral lower extremities  NEURO: leg strength 4/5 bilaterally, " seated with poor posture in wheelchair, No focal neurologic deficits  PSYCH: Mood good, affect mood congruent    No images are attached to the encounter.    No results found for this or any previous visit (from the past 24 hour(s)).    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  We will continue to monitor  Plan:     (R29.898) Leg weakness, bilateral  Comment: referral to physical therapy given for assistance with transfer from wheel chair  Plan: HOME CARE NURSING REFERRAL               Follow up in 6 months  25 minutes spent with patient.  Over 50% of time counseling      Disclaimer: This note consists of symbols derived from keyboarding, dictation and/or voice recognition software. As a result, there may be errors in the script that have gone undetected. Please consider this when interpreting information found in this chart.    Man Stovall MD  [phone]

## 2018-01-29 NOTE — NURSING NOTE
"Chief Complaint   Patient presents with     6 Month Follow up        Initial /68 (BP Location: Right arm, Patient Position: Chair, Cuff Size: Adult Small)  Pulse 76  Temp 98.9  F (37.2  C) (Tympanic)  Ht 5' 2\" (1.575 m)  Wt 100 lb (45.4 kg)  SpO2 96%  BMI 18.29 kg/m2 Estimated body mass index is 18.29 kg/(m^2) as calculated from the following:    Height as of this encounter: 5' 2\" (1.575 m).    Weight as of this encounter: 100 lb (45.4 kg)..    BP completed using cuff size: small regular  MEDICATIONS REVIEWED  SOCIAL AND FAMILY HX REVIEWED  Vashti Cassidy CMA  "

## 2018-01-29 NOTE — PATIENT INSTRUCTIONS
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  We will continue to monitor  Plan:     (R29.898) Leg weakness, bilateral  Comment: referral to physical therapy given for assistance with transfer from wheel chair  Plan: HOME CARE NURSING REFERRAL

## 2018-01-29 NOTE — MR AVS SNAPSHOT
After Visit Summary   1/29/2018    Kalli Brown    MRN: [id number]           Patient Information     Date Of Birth          2/26/1929        Visit Information        Provider Department      1/29/2018 3:30 PM Man Stovall MD [facility]        Today's Diagnoses     Need for prophylactic vaccination and inoculation against influenza    -  1    Fall, sequela        CKD (chronic kidney disease) stage 3, GFR 30-59 ml/min        Light chain nephropathy        Anemia of chronic kidney failure, stage 3 (moderate)        Chronic low back pain, unspecified back pain laterality, with sciatica presence unspecified        Trigeminal neuralgia        Leg weakness, bilateral          Care Instructions    (Z23) Need for prophylactic vaccination and inoculation against influenza  (primary encounter diagnosis)  Comment: given  Plan:     (W19.XXXS) Fall, sequela  Comment: due to recent fall and perceived leg weakness we will refer for physical therapy and they will call you to coordinate home appointment   Plan: HOME CARE NURSING REFERRAL            (N18.3) CKD (chronic kidney disease) stage 3, GFR 30-59 ml/min  Comment: Stable - follow up in Neprhology and lasix dose recently incrased to 80 mg  Plan:     (N05.8) Light chain nephropathy  Comment: as above  - stable  Plan:     (N18.3,  D63.1) Anemia of chronic kidney failure, stage 3 (moderate)  Comment: No longer taking Aranesp and we will to continue to monitor  Plan:     (M54.5,  G89.29) Chronic low back pain, unspecified back pain laterality, with sciatica presence unspecified  Comment: as above = OK to continue aceteaminophen   Plan: acetaminophen (TYLENOL) 500 MG tablet            (G50.0) Trigeminal neuralgia  Comment: Carbamazepine has been helpful for this pain.  We will continue to monitor  Plan:     (R29.898) Leg weakness, bilateral  Comment: referral to physical therapy given for assistance with transfer from wheel  chair  Plan: HOME CARE NURSING REFERRAL                     Follow-ups after your visit        Additional Services     HOME CARE NURSING REFERRAL       **Order classes of: [place] [facility], [facility] and [place] [facility] will route to the Home Care and Hospice Referral Pool.  Home Care or Hospice will then contact the patient to schedule their appointment.**    If you do not hear from Home Care and Hospice, or you would like to call to schedule, please call the referring place of service that your provider has listed below.  ______________________________________________________________________    Your provider has referred you to: [facility]: [facility] [phone]   [email or web address]    Extended Emergency Contact Information  Primary Emergency Contact: Chavo Brown (POA)   [facility]  Home Phone: none  Work Phone: none  Mobile Phone: [phone]  Relation: Son  Secondary Emergency Contact: Fuad Brown (POA)   [facility]  Home Phone: none  Work Phone: none  Mobile Phone: [phone]  Relation: Son    Patient Anticipated Discharge Date: 1/29/2018    RN, PT, HHA to begin 24 - 48 hours after discharge.  PLEASE EVALUATE AND TREAT (Evaluation timeline is 24 - 48 hrs. Please call if there is need for a variance to this timeline).    REASON FOR REFERRAL: Assessment & Treatment: PT and Fall Risk Assessment: Member indicated use of wheelchair      ADDITIONAL SERVICES NEEDED:     OTHER PERTINENT INFORMATION: Patient was last seen by provider on 1/29/2018  for leg weakness.    Current Outpatient Prescriptions:  acetaminophen (TYLENOL) 500 MG tablet, Take 2 tablets (1,000 mg) by mouth 3 times daily, Disp: 540 tablet, Rfl: 0  carBAMazepine (TEGRETOL) 200 MG tablet, Take 1 tablet (200 mg) by mouth 2 times daily Take 100 mg as needed for pain on scale 1-5 and 200 mg as needed  for pain on scale 6-10, Disp: 120 tablet, Rfl: 0  furosemide (LASIX) 80 MG tablet, Take 1 tablet  (80 mg) by mouth daily, Disp: 30 tablet, Rfl:   ASPIRIN LOW DOSE 81 MG chewable tablet, 1 TAB BY MOUTH DAILY (DX:ANTICOAGULATION), Disp: 30 tablet, Rfl: 11  sertraline (ZOLOFT) 25 MG tablet, Take 1 tablet (25 mg) by mouth daily, Disp: 30 tablet, Rfl: 11  polyethylene glycol (MIRALAX) powder, Take 17 g by mouth daily as needed, Disp: 510 g, Rfl: 3  ferrous sulfate (IRON) 325 (65 FE) MG tablet, Take 1 tablet (325 mg) by mouth 2 times daily (with meals), Disp: 60 tablet, Rfl: 11  amLODIPine (NORVASC) 10 MG tablet, Take 1 tablet (10 mg) by mouth daily, Disp: 30 tablet, Rfl: 11  calcium citrate-vitamin D (CITRACAL) 315-250 MG-UNIT TABS per tablet, Take 1 tablet by mouth 2 times daily -Take with food, Disp: 180 tablet, Rfl: 3  irbesartan-hydrochlorothiazide (AVALIDE) 300-12.5 MG per tablet, Take 1 tablet by mouth daily, Disp: , Rfl:   menthol (HALLS COUGH DROPS) 7 MG LOZG, Take 1 lozenge (7 mg) by mouth every hour as needed for cough, Disp: 84 lozenge, Rfl:   mirtazapine (REMERON) 7.5 MG TABS tablet, Take 1 tablet (7.5 mg) by mouth At Bedtime, Disp: 90 tablet, Rfl: 3  docusate sodium (COLACE) 250 MG capsule, Take 1 capsule (250 mg) by mouth daily as needed for constipation, Disp: 90 capsule, Rfl: 3  latanoprost (XALATAN) 0.005 % ophthalmic solution, Place 1 drop into both eyes At Bedtime, Disp: , Rfl:   VITAMIN D, CHOLECALCIFEROL, PO, Take 1,000 Units by mouth daily , Disp: , Rfl:   [DISCONTINUED] carBAMazepine (TEGRETOL) 200 MG tablet, Take 100 mg as needed for pain on scale 1-5 and 200 mg as needed  for pain on scale 6-10, Disp: 120 tablet, Rfl: 0  [DISCONTINUED] furosemide (LASIX) 80 MG tablet, Take 60 mg by mouth daily , Disp: 30 tablet, Rfl:       Patient Active Problem List:     Failure to thrive in adult     Fall     Left shoulder pain     CKD (chronic kidney disease) stage 3, GFR 30-59 ml/min     MPGN (membranoproliferative glomerulonephritis)     Anemia of chronic kidney failure, stage 3 (moderate)     Iron  deficiency anemia     Chronic constipation     Trigeminal neuralgia     Compression fracture of lumbar vertebra with routine healing     Cataract     Glaucoma     S/P total hip arthroplasty     Balance problem     Nontraumatic shoulder pain, right     Light chain nephropathy     Chronic pain     DAVID (generalized anxiety disorder)     Benign essential hypertension      Documentation of Face to Face and Certification for Home Health Services    I certify that patient, Kalli Brown is under my care and that I, or a Nurse Practitioner or Physician's Assistant working with me, had a face-to-face encounter that meets the physician face-to-face encounter requirements with this patient on: 1/29/2018.    This encounter with the patient was in whole, or in part, for the following medical condition, which is the primary reason for Home Health Care: Leg Weakness, recent fall.    I certify that, based on my findings, the following services are medically necessary Home Health Services: Physical Therapy    My clinical findings support the need for the above services because: Physical Therapy Services are needed to assess and treat the following functional impairments: transferring from chair to bed and toliet.    Further, I certify that my clinical findings support that this patient is homebound (i.e. absences from home require considerable and taxing effort and are for medical reasons or [facility] services or infrequently or of short duration when for other reasons) because: Leaving home is medically contraindicated for the following reason(s): wheelchair bound    Based on the above findings, I certify that this patient is confined to the home and needs intermittent skilled nursing care, physical therapy and/or speech therapy.  The patient is under my care, and I have initiated the establishment of the plan of care.  This patient will be followed by a physician who will periodically review the plan of  "care.    Physician/Provider to provide follow up care: Man Stovall    [facility] certified Physician at time of discharge: Man Stovall MD     Please be aware that coverage of these services is subject to the terms and limitations of your health insurance plan.  Call member services at your health plan with any benefit or coverage questions.                  Who to contact     If you have questions or need follow up information about today's clinic visit or your schedule please contact [facility] directly at [phone].  Normal or non-critical lab and imaging results will be communicated to you by Artsiclehart, letter or phone within 4 business days after the clinic has received the results. If you do not hear from us within 7 days, please contact the clinic through EoeMobilet or phone. If you have a critical or abnormal lab result, we will notify you by phone as soon as possible.  Submit refill requests through DealerRater or call your pharmacy and they will forward the refill request to us. Please allow 3 business days for your refill to be completed.          Additional Information About Your Visit        DealerRater Information     DealerRater lets you send messages to your doctor, view your test results, renew your prescriptions, schedule appointments and more. To sign up, go to [email or web address] . Click on \"Log in\" on the left side of the screen, which will take you to the Welcome page. Then click on \"Sign up Now\" on the right side of the page.     You will be asked to enter the access code listed below, as well as some personal information. Please follow the directions to create your username and password.     Your access code is: [email or web address]  Expires: 2018  2:04 PM     Your access code will  in 90 days. If you need help or a new code, please call your [facility] or [phone].        Care EveryWhere ID     This is your Care EveryWhere ID. This could be used by " "other organizations to access your [facility] medical records  [id number]        Your Vitals Were     Pulse Temperature Height Pulse Oximetry BMI (Body Mass Index)       76 98.9  F (37.2  C) (Tympanic) 5' 2\" (1.575 m) 96% 18.29 kg/m2        Blood Pressure from Last 3 Encounters:   01/29/18 129/68   11/30/17 149/71   07/05/17 146/60    Weight from Last 3 Encounters:   01/29/18 100 lb (45.4 kg)   11/30/17 103 lb (46.7 kg)   07/05/17 110 lb (49.9 kg)              We Performed the Following     HOME CARE NURSING REFERRAL          Today's Medication Changes          These changes are accurate as of 1/29/18  4:05 PM.  If you have any questions, ask your nurse or doctor.               These medicines have changed or have updated prescriptions.        Dose/Directions    acetaminophen 500 MG tablet   Commonly known as:  TYLENOL   This may have changed:    - when to take this  - reasons to take this   Used for:  Chronic low back pain, unspecified back pain laterality, with sciatica presence unspecified   Changed by:  Man Stovall MD        Dose:  1000 mg   Take 2 tablets (1,000 mg) by mouth 3 times daily   Quantity:  540 tablet   Refills:  0       carBAMazepine 200 MG tablet   Commonly known as:  TEGretol   This may have changed:    - how much to take  - how to take this  - when to take this   Changed by:  Man Stovall MD        Dose:  200 mg   Take 1 tablet (200 mg) by mouth 2 times daily Take 100 mg as needed for pain on scale 1-5 and 200 mg as needed  for pain on scale 6-10   Quantity:  120 tablet   Refills:  0       LASIX 80 MG tablet   This may have changed:  how much to take   Generic drug:  furosemide   Changed by:  Man Stovall MD        Dose:  80 mg   Take 1 tablet (80 mg) by mouth daily   Quantity:  30 tablet   Refills:  0                Primary Care Provider Office Phone # Fax #    Man Stovall [phone] [address]  [place] " [address]        Equal Access to Services     [facility]: Hadii juve leigh gaviota Loco, wabobyda luqadaha, qaybta kajessiene cantu, willis colbert. So [facility] [phone].    ATENCIÓN: Si habla español, tiene a chiu disposición servicios gratuitos de asistencia lingüística. Meng al [phone].    We comply with applicable federal civil rights laws and [place] laws. We do not discriminate on the basis of race, color, national origin, age, disability, sex, sexual orientation, or gender identity.            Thank you!     Thank you for choosing [facility]  for your care. Our goal is always to provide you with excellent care. Hearing back from our patients is one way we can continue to improve our services. Please take a few minutes to complete the written survey that you may receive in the mail after your visit with us. Thank you!             Your Updated Medication List - Protect others around you: Learn how to safely use, store and throw away your medicines at www.disposemymeds.org.          This list is accurate as of 1/29/18  4:05 PM.  Always use your most recent med list.                   Brand Name Dispense Instructions for use Diagnosis    acetaminophen 500 MG tablet    TYLENOL    540 tablet    Take 2 tablets (1,000 mg) by mouth 3 times daily    Chronic low back pain, unspecified back pain laterality, with sciatica presence unspecified       amLODIPine 10 MG tablet    NORVASC    30 tablet    Take 1 tablet (10 mg) by mouth daily    Benign essential hypertension       ASPIRIN LOW DOSE 81 MG chewable tablet   Generic drug:  aspirin     30 tablet    1 TAB BY MOUTH DAILY (DX:ANTICOAGULATION)    Benign essential hypertension       calcium citrate-vitamin D 315-250 MG-UNIT Tabs per tablet    CITRACAL    180 tablet    Take 1 tablet by mouth 2 times daily -Take with food    Osteoporosis, unspecified osteoporosis type, unspecified pathological fracture presence       carBAMazepine  200 MG tablet    TEGretol    120 tablet    Take 1 tablet (200 mg) by mouth 2 times daily Take 100 mg as needed for pain on scale 1-5 and 200 mg as needed  for pain on scale 6-10        docusate sodium 250 MG capsule    COLACE    90 capsule    Take 1 capsule (250 mg) by mouth daily as needed for constipation    Constipation, unspecified constipation type       ferrous sulfate 325 (65 FE) MG tablet    IRON    60 tablet    Take 1 tablet (325 mg) by mouth 2 times daily (with meals)    Iron deficiency anemia, unspecified iron deficiency anemia type       HALLS COUGH DROPS 7 MG Lozg   Generic drug:  menthol     84 lozenge    Take 1 lozenge (7 mg) by mouth every hour as needed for cough        irbesartan-hydrochlorothiazide 300-12.5 MG per tablet    AVALIDE     Take 1 tablet by mouth daily        LASIX 80 MG tablet   Generic drug:  furosemide     30 tablet    Take 1 tablet (80 mg) by mouth daily        latanoprost 0.005 % ophthalmic solution    XALATAN     Place 1 drop into both eyes At Bedtime        mirtazapine 7.5 MG Tabs tablet    REMERON    90 tablet    Take 1 tablet (7.5 mg) by mouth At Bedtime    Primary insomnia       polyethylene glycol powder    MIRALAX    510 g    Take 17 g by mouth daily as needed    Constipation, unspecified constipation type       sertraline 25 MG tablet    ZOLOFT    30 tablet    Take 1 tablet (25 mg) by mouth daily    DAVID (generalized anxiety disorder)       VITAMIN D (CHOLECALCIFEROL) PO      Take 1,000 Units by mouth daily

## 2018-02-01 ENCOUNTER — DOCUMENTATION ONLY (OUTPATIENT)
Dept: CARE COORDINATION | Facility: CLINIC | Age: 83
End: 2018-02-01

## 2018-02-02 NOTE — PROGRESS NOTES
[facility] now requests orders and shares plan of care/discharge summaries for some patients through [employer].  Please REPLY TO THIS MESSAGE in order to give authorization for orders when needed.  This is considered a verbal order, you will still receive a faxed copy of orders for signature.  Thank you for your assistance in improving collaboration for our patients.    ORDER PT admission to home care completed . Continue 2 w 2, 1 w 1 for there ex, gait training to alyssia, transfer training.   OT eval and treat for ADL independence and safety.  SW eval and treat, assist with MICAH Yan Physical Therapist  [facility]  [address] [place] [address]  [email or web address]  [email or web address]   Office: [phone] Cell:[phone]

## 2018-02-05 ENCOUNTER — DOCUMENTATION ONLY (OUTPATIENT)
Dept: CARE COORDINATION | Facility: CLINIC | Age: 83
End: 2018-02-05

## 2018-02-05 NOTE — PROGRESS NOTES
[facility] now requests orders and shares plan of care/discharge summaries for some patients through walkby.  Please REPLY TO THIS MESSAGE in order to give authorization for orders when needed.  This is considered a verbal order, you will still receive a faxed copy of orders for signature.  Thank you for your assistance in improving collaboration for our patients.    DISCHARGE SUMMARY  FYI, Ms. Brown called to cancel all home care visits, declining all services. I reminded her that we were there under your recommendation, but she stated she is overwhelmed with the visits from the [facility], for pills 3 x a day and meals, and that is enough.    Thank you for the referral.     Sissy Yan Physical Therapist  [facility]  [address] [place] [address]  [email or web address]  [email or web address]   Office: [phone] Cell:[phone]

## 2018-05-09 ENCOUNTER — TELEPHONE (OUTPATIENT)
Dept: FAMILY MEDICINE | Facility: CLINIC | Age: 83
End: 2018-05-09

## 2018-05-09 NOTE — TELEPHONE ENCOUNTER
BRIAN to PCP:     Reason for Call:  Med question      Detailed comments: Pt is complaining about increased trigeminal neuralgia pain ( she is currently taking Tegretol)  Currently taking 100MG 2x a day wondering if she can increase     Spoke with MARIELY Tony from [facility]. [phone] VM is okay     Can we leave a detailed message on this number? YES    Current med list here has Carbamezapine (Tegretol) as taking 200mg BID. Chavo states Dr. Aurelio Castro with nephrology had decreased pt's dose in February to 100mg BID due to somnolence and pt was okay for a while on that dose, but now has increased pain and more teary. Advised they call Dr. Castro's office to verify if okay with increasing back to 200mg BID as he was the provider who decreased the dose. They will call back here if Dr. Castro advises PCP advise on dosing instead    Cintia LABOY RN

## 2018-05-09 NOTE — TELEPHONE ENCOUNTER
"Tried calling Chavo back at below number  Left message \"calling to return your call in regards to Kalli. Our number here is ([phone])\"   If he calls back note pt has no C2C on file so will be unable to give any health information to him regarding pt    Cintia LABOY RN    "

## 2018-05-09 NOTE — TELEPHONE ENCOUNTER
Pt's son called in [facility] to get a hold of Dr. Wilman Best.  When I went to transfer, he hung up?  Call came from number [phone] which is son, Chavo's number.  No c2c.

## 2018-05-10 NOTE — TELEPHONE ENCOUNTER
I called and left message asking him to call back and let me know if he has heard from Dr. Castro.    Man Stovall MD

## 2018-05-19 ENCOUNTER — HOSPITAL ENCOUNTER (EMERGENCY)
Facility: CLINIC | Age: 83
Discharge: HOME | End: 2018-05-19
Attending: EMERGENCY MEDICINE | Admitting: EMERGENCY MEDICINE
Payer: MEDICARE

## 2018-05-19 VITALS
DIASTOLIC BLOOD PRESSURE: 77 MMHG | SYSTOLIC BLOOD PRESSURE: 148 MMHG | RESPIRATION RATE: 16 BRPM | TEMPERATURE: 98.2 F | OXYGEN SATURATION: 96 % | HEIGHT: 65 IN

## 2018-05-19 DIAGNOSIS — S00.83XA CONTUSION OF FOREHEAD, INITIAL ENCOUNTER: ICD-10-CM

## 2018-05-19 PROCEDURE — 99282 EMERGENCY DEPT VISIT SF MDM: CPT

## 2018-05-19 ASSESSMENT — ENCOUNTER SYMPTOMS: HEADACHES: 1

## 2018-05-19 NOTE — ED AVS SNAPSHOT
Emergency Department    [address] [place] [address]    Phone:  [phone]    Fax:  [phone]                                       Kalli Brown   MRN: [id number]    Department:   Emergency Department   Date of Visit:  5/19/2018           After Visit Summary Signature Page     I have received my discharge instructions, and my questions have been answered. I have discussed any challenges I see with this plan with the nurse or doctor.    ..........................................................................................................................................  Patient/Patient Representative Signature      ..........................................................................................................................................  Patient Representative Print Name and Relationship to Patient    ..................................................               ................................................  Date                                            Time    ..........................................................................................................................................  Reviewed by Signature/Title    ...................................................              ..............................................  Date                                                            Time

## 2018-05-19 NOTE — ED PROVIDER NOTES
History     Chief Complaint:  Fall     HPI   Kalli Brown is a 89 year old female who presents to the emergency department today for evaluation of a fall and head injury. The patient's son reports the patient fell this evening while in the bathroom at her [facility], hitting the front of her head. The patient did not lose consciousness. The patient states she was sitting on the toilet and tried to stand up, but then fell down and hit her head. The patient also complains of pain to her shoulders, however her son states the patient has chronically dislocated shoulders.     Allergies:  Atorvastatin      Medications:    acetaminophen (TYLENOL) 500 MG tablet  amLODIPine (NORVASC) 10 MG tablet  ASPIRIN LOW DOSE 81 MG chewable tablet  calcium citrate-vitamin D (CITRACAL) 315-250 MG-UNIT TABS per tablet  carBAMazepine (TEGRETOL) 200 MG tablet  docusate sodium (COLACE) 250 MG capsule  ferrous sulfate (IRON) 325 (65 FE) MG tablet  furosemide (LASIX) 80 MG tablet  irbesartan-hydrochlorothiazide (AVALIDE) 300-12.5 MG per tablet  latanoprost (XALATAN) 0.005 % ophthalmic solution  menthol (HALLS COUGH DROPS) 7 MG LOZG  mirtazapine (REMERON) 7.5 MG TABS tablet  sertraline (ZOLOFT) 25 MG tablet  VITAMIN D, CHOLECALCIFEROL, PO    Past Medical History:    Anemia   Deafness   Dislocated shoulders   Edema   Glaucoma   Hypertension   Multiple myeloma   Proteinuria   Trigeminal neuralgia     Past Surgical History:    Eye surgery   GYN surgery   IOL   Orthopedic surgery     Family History:    History reviewed. No pertinent family history.      Social History:  The patient was accompanied to the ED by her son.  Smoking Status: Never smoker  Smokeless Tobacco: no  Alcohol Use: No    Marital Status:        Review of Systems   Neurological: Positive for headaches. Negative for syncope.   All other systems reviewed and are negative.    Physical Exam   No data found.     Physical Exam  Constitutional:  The patient is oriented to person, place, and time.   HENT:   Head: Atraumatic. No hematoma.  Right Ear: Normal  Left Ear: Normal  Nose: Nose normal.   Mouth/Throat: Oropharynx is clear and moist. No erythema or exudate.   Eyes: Conjunctivae and EOM are normal. Pupils are equal, round, and reactive to light. No discharge  Neck: Normal range of motion. Neck supple.   Cardiovascular: Normal rate, regular rhythm, no murmur gallops or rubs. Intact distal pulses.    Pulmonary/Chest: CTA bilaterally. No wheezes rale or rhonchi.  Abdominal: Soft. Non tender.  No masses   Musculoskeletal: No edema. No bony deformity. Marked kyphosis. Chronic dislocation of both shoulders. No spinal tenderness.   Lymphadenopathy:     The patient has no cervical adenopathy.   Neurological: The patient is alert and oriented to person, place, and time. The patient has normal strength and normal reflexes. No cranial nerve deficit. Coordination normal. GCS 15  Skin: Skin is warm and dry. No rash noted. The patient is not diaphoretic. Superficial abrasion left shoulder.   Psychiatric: The patient has a normal mood and affect.    Emergency Department Course     Emergency Department Course:  Nursing notes and vitals reviewed.  0051 I performed an exam of the patient as documented above.   Findings and plan explained to the Patient and son. Patient discharged home with instructions regarding supportive care, medications, and reasons to return. The importance of close follow-up was reviewed.   Impression & Plan      Medical Decision Making:  Kalli Brown is a 89 year old female who presents after a slip and fall while going to the bathroom tonight. She did strike her head but there was no loss of consciousness. On my exam, she has no signs of significant head trauma. No hematoma. She has normal neurologic exam. No spinal tenderness. Do not feel she needs advanced imaging at this time. She is not anticoagulated. Spoke with her son who was  here and he was in agreement. The patient can use tylenol for pain. Return for problems.     Diagnosis:    ICD-10-CM    1. Contusion of forehead, initial encounter S00.83XA        Disposition:  Discharged to home.     Scribe Disclosure:  I, Heber Rivas, am serving as a scribe at 12:47 AM on 5/19/2018 to document services personally performed by Aurelio Escalante MD based on my observations and the provider's statements to me.    5/19/2018    EMERGENCY DEPARTMENT       Aurelio Escalante MD  05/19/18 0357

## 2018-05-19 NOTE — DISCHARGE INSTRUCTIONS
"  Facial Contusion  A contusion is another word for a bruise. It happens when small blood vessels break open and leak blood into the nearby area. A facial contusion can result from a bump, hit, or fall. This may happen during sports or an accident. Symptoms of a contusion often include changes in skin color (bruising), swelling, and pain.   The swelling from the contusion should decrease in a few days. Bruising and pain may take several weeks to go away.   Home care    If you have been prescribed medicines for pain, take them as directed.    To help reduce swelling and pain, wrap a cold pack or bag of frozen peas in a thin towel. Put it on the injured area for up to 20 minutes. Do this a few times a day until the swelling goes down.     If you have scrapes or cuts on your face requiring stiches or other closures, care for them as directed.    For the next 24 hours (or longer if instructed):  ? Don t drink alcohol, or use sedatives or medicines that make you sleepy.  ? Don t drive or operate machinery.  ? Don't do anything strenuous. Don t lift or strain.  ? Don't return to sports or other activity that could result in another head injury.  Note about concussions  Because the injury was to your head, it is possible that a concussion (mild brain injury) could result. Symptoms of a concussion can show up later. Be alert for signs and symptoms of a concussion. Seek emergency medical care if any of these develop over the next hours to days:    Headache    Nausea or vomiting    Dizziness    Sensitivity to light or noise    Unusual sleepiness or grogginess    Trouble falling asleep    Personality changes    Vision changes    Memory loss    Confusion    Trouble walking or clumsiness    Loss of consciousness (even for a short time)    Inability to be awakened    Feeling \"off\" or slow as if in a daze   Follow-up care  Follow up with your healthcare provider, or as directed.  When to seek medical advice  Call your healthcare " provider right away if any of these occur:    Swelling or pain that gets worse, not better    New swelling or pain    Warmth or drainage from the swollen area or from cuts or scrapes    Fluid drainage or bleeding from the nose or ears    Fever of 100.4 F (38 C) or higher, or as directed by your healthcare provider  Call 911  Call 911 if any of the following occur:     Repeated vomiting    Unusual drowsiness or trouble awakening    Fainting or loss of consciousness    Seizure    Worsening confusion, memory loss, dizziness, headache, behavior, speech, or vision  Date Last Reviewed: 5/1/2017 2000-2017 [facility]. [address] [place] [address]. All rights reserved. This information is not intended as a substitute for professional medical care. Always follow your healthcare professional's instructions.

## 2018-05-19 NOTE — ED AVS SNAPSHOT
Emergency Department    [address] [facility] [address]    Phone:  [phone]    Fax:  [phone]                                       Kalli Brown   MRN: [id number]    Department:   Emergency Department   Date of Visit:  5/19/2018           Patient Information     Date Of Birth          2/26/1929        Your diagnoses for this visit were:     Contusion of forehead, initial encounter        You were seen by Aurelio Escalante MD.      Follow-up Information     Follow up with Man Stovall MD.    Specialty:  Internal Medicine    Contact information:    [address] [address]  [phone]          Discharge Instructions         Facial Contusion  A contusion is another word for a bruise. It happens when small blood vessels break open and leak blood into the nearby area. A facial contusion can result from a bump, hit, or fall. This may happen during sports or an accident. Symptoms of a contusion often include changes in skin color (bruising), swelling, and pain.   The swelling from the contusion should decrease in a few days. Bruising and pain may take several weeks to go away.   Home care    If you have been prescribed medicines for pain, take them as directed.    To help reduce swelling and pain, wrap a cold pack or bag of frozen peas in a thin towel. Put it on the injured area for up to 20 minutes. Do this a few times a day until the swelling goes down.     If you have scrapes or cuts on your face requiring stiches or other closures, care for them as directed.    For the next 24 hours (or longer if instructed):  ? Don t drink alcohol, or use sedatives or medicines that make you sleepy.  ? Don t drive or operate machinery.  ? Don't do anything strenuous. Don t lift or strain.  ? Don't return to sports or other activity that could result in another head injury.  Note about concussions  Because the injury was to your head, it is possible that a  "concussion (mild brain injury) could result. Symptoms of a concussion can show up later. Be alert for signs and symptoms of a concussion. Seek emergency medical care if any of these develop over the next hours to days:    Headache    Nausea or vomiting    Dizziness    Sensitivity to light or noise    Unusual sleepiness or grogginess    Trouble falling asleep    Personality changes    Vision changes    Memory loss    Confusion    Trouble walking or clumsiness    Loss of consciousness (even for a short time)    Inability to be awakened    Feeling \"off\" or slow as if in a daze   Follow-up care  Follow up with your healthcare provider, or as directed.  When to seek medical advice  Call your healthcare provider right away if any of these occur:    Swelling or pain that gets worse, not better    New swelling or pain    Warmth or drainage from the swollen area or from cuts or scrapes    Fluid drainage or bleeding from the nose or ears    Fever of 100.4 F (38 C) or higher, or as directed by your healthcare provider  Call 911  Call 911 if any of the following occur:     Repeated vomiting    Unusual drowsiness or trouble awakening    Fainting or loss of consciousness    Seizure    Worsening confusion, memory loss, dizziness, headache, behavior, speech, or vision  Date Last Reviewed: 5/1/2017 2000-2017 [employer]. [address] [place] [address]. All rights reserved. This information is not intended as a substitute for professional medical care. Always follow your healthcare professional's instructions.          24 Hour Appointment Hotline       To make an appointment at any [facility], call [phone] ([phone]). If you don't have a family doctor or clinic, we will help you find one. [facility] are conveniently located to serve the needs of you and your family.             Review of your medicines      Our records show that you are taking the medicines listed below. If these " are incorrect, please call your family doctor or clinic.        Dose / Directions Last dose taken    acetaminophen 500 MG tablet   Commonly known as:  TYLENOL   Dose:  1000 mg   Quantity:  540 tablet        Take 2 tablets (1,000 mg) by mouth 3 times daily   Refills:  0        amLODIPine 10 MG tablet   Commonly known as:  NORVASC   Dose:  10 mg   Quantity:  30 tablet        Take 1 tablet (10 mg) by mouth daily   Refills:  11        ASPIRIN LOW DOSE 81 MG chewable tablet   Quantity:  30 tablet   Generic drug:  aspirin        1 TAB BY MOUTH DAILY (DX:ANTICOAGULATION)   Refills:  11        calcium citrate-vitamin D 315-250 MG-UNIT Tabs per tablet   Commonly known as:  CITRACAL   Dose:  1 tablet   Quantity:  180 tablet        Take 1 tablet by mouth 2 times daily -Take with food   Refills:  3        carBAMazepine 200 MG tablet   Commonly known as:  TEGretol   Dose:  200 mg   Quantity:  120 tablet        Take 1 tablet (200 mg) by mouth 2 times daily Take 100 mg as needed for pain on scale 1-5 and 200 mg as needed  for pain on scale 6-10   Refills:  0        docusate sodium 250 MG capsule   Commonly known as:  COLACE   Dose:  250 mg   Quantity:  90 capsule        Take 1 capsule (250 mg) by mouth daily as needed for constipation   Refills:  3        ferrous sulfate 325 (65 Fe) MG tablet   Commonly known as:  IRON   Dose:  325 mg   Quantity:  60 tablet        Take 1 tablet (325 mg) by mouth 2 times daily (with meals)   Refills:  11        HALLS COUGH DROPS 7 MG Lozg   Dose:  1 lozenge   Quantity:  84 lozenge   Generic drug:  menthol        Take 1 lozenge (7 mg) by mouth every hour as needed for cough   Refills:  0        irbesartan-hydrochlorothiazide 300-12.5 MG per tablet   Commonly known as:  AVALIDE   Dose:  1 tablet        Take 1 tablet by mouth daily   Refills:  0        LASIX 80 MG tablet   Dose:  80 mg   Quantity:  30 tablet   Generic drug:  furosemide        Take 1 tablet (80 mg) by mouth daily   Refills:  0         latanoprost 0.005 % ophthalmic solution   Commonly known as:  XALATAN   Dose:  1 drop        Place 1 drop into both eyes At Bedtime   Refills:  0        mirtazapine 7.5 MG Tabs tablet   Commonly known as:  REMERON   Dose:  7.5 mg   Quantity:  90 tablet        Take 1 tablet (7.5 mg) by mouth At Bedtime   Refills:  3        polyethylene glycol powder   Commonly known as:  MIRALAX   Dose:  17 g   Quantity:  510 g        Take 17 g by mouth daily as needed   Refills:  3        sertraline 25 MG tablet   Commonly known as:  ZOLOFT   Dose:  25 mg   Quantity:  30 tablet        Take 1 tablet (25 mg) by mouth daily   Refills:  11        VITAMIN D (CHOLECALCIFEROL) PO   Dose:  1000 Units        Take 1,000 Units by mouth daily   Refills:  0                Orders Needing Specimen Collection     None      Pending Results     No orders found from 5/17/2018 to 5/20/2018.            Pending Culture Results     No orders found from 5/17/2018 to 5/20/2018.            Pending Results Instructions     If you had any lab results that were not finalized at the time of your Discharge, you can call the ED Lab Result RN at [phone]. You will be contacted by this team for any positive Lab results or changes in treatment. The nurses are available 7 days a week from 10A to 6:30P.  You can leave a message 24 hours per day and they will return your call.        Test Results From Your Hospital Stay               Clinical Quality Measure: Blood Pressure Screening     Your blood pressure was checked while you were in the emergency department today. The last reading we obtained was  BP: 159/85 . Please read the guidelines below about what these numbers mean and what you should do about them.  If your systolic blood pressure (the top number) is less than 120 and your diastolic blood pressure (the bottom number) is less than 80, then your blood pressure is normal. There is nothing more that you need to do about it.  If your systolic blood pressure  "(the top number) is 120-139 or your diastolic blood pressure (the bottom number) is 80-89, your blood pressure may be higher than it should be. You should have your blood pressure rechecked within a year by a primary care provider.  If your systolic blood pressure (the top number) is 140 or greater or your diastolic blood pressure (the bottom number) is 90 or greater, you may have high blood pressure. High blood pressure is treatable, but if left untreated over time it can put you at risk for heart attack, stroke, or kidney failure. You should have your blood pressure rechecked by a primary care provider within the next 4 weeks.  If your provider in the emergency department today gave you specific instructions to follow-up with your doctor or provider even sooner than that, you should follow that instruction and not wait for up to 4 weeks for your follow-up visit.        Thank you for choosing [facility]       Thank you for choosing [facility] for your care. Our goal is always to provide you with excellent care. Hearing back from our patients is one way we can continue to improve our services. Please take a few minutes to complete the written survey that you may receive in the mail after you visit with us. Thank you!        "[employer] Information     [employer] lets you send messages to your doctor, view your test results, renew your prescriptions, schedule appointments and more. To sign up, go to [email or web address] . Click on \"Log in\" on the left side of the screen, which will take you to the Welcome page. Then click on \"Sign up Now\" on the right side of the page.     You will be asked to enter the access code listed below, as well as some personal information. Please follow the directions to create your username and password.     Your access code is: [email or web address]  Expires: 2018  1:07 AM     Your access code will  in 90 days. If you need help or a new code, please call your [facility] clinic or [phone].   "      Care EveryWhere ID     This is your Care EveryWhere ID. This could be used by other organizations to access your [facility] medical records  [id number]        Equal Access to Services     KASH FIGUEROA : Byron Loco, hoa lopez, ellis cantu, willis colbert. [facility] [phone].    ATENCIÓN: Si habla español, tiene a chiu disposición servicios gratuitos de asistencia lingüística. Llame al [phone].    We comply with applicable federal civil rights laws and [place] laws. We do not discriminate on the basis of race, color, national origin, age, disability, sex, sexual orientation, or gender identity.            After Visit Summary       This is your record. Keep this with you and show to your community pharmacist(s) and doctor(s) at your next visit.

## 2018-06-12 ENCOUNTER — TRANSFERRED RECORDS (OUTPATIENT)
Dept: HEALTH INFORMATION MANAGEMENT | Facility: CLINIC | Age: 83
End: 2018-06-12

## 2018-06-12 ENCOUNTER — RECORDS - HEALTHEAST (OUTPATIENT)
Dept: LAB | Facility: CLINIC | Age: 83
End: 2018-06-12

## 2018-06-12 LAB
ALBUMIN UR-MCNC: ABNORMAL MG/DL
AMORPH CRY #/AREA URNS HPF: ABNORMAL /[HPF]
APPEARANCE UR: CLEAR
BACTERIA #/AREA URNS HPF: ABNORMAL HPF
BILIRUB UR QL STRIP: NEGATIVE
COLOR UR AUTO: ABNORMAL
GLUCOSE UR STRIP-MCNC: NEGATIVE MG/DL
HGB UR QL STRIP: ABNORMAL
HYALINE CASTS #/AREA URNS LPF: ABNORMAL LPF
KETONES UR STRIP-MCNC: NEGATIVE MG/DL
LEUKOCYTE ESTERASE UR QL STRIP: NEGATIVE
NITRATE UR QL: NEGATIVE
PH UR STRIP: 6.5 [PH] (ref 4.5–8)
RBC #/AREA URNS AUTO: ABNORMAL HPF
SP GR UR STRIP: 1.01 (ref 1–1.03)
SQUAMOUS #/AREA URNS AUTO: ABNORMAL LPF
TRANS CELLS #/AREA URNS HPF: ABNORMAL LPF
UROBILINOGEN UR STRIP-ACNC: ABNORMAL
WBC #/AREA URNS AUTO: ABNORMAL HPF

## 2018-06-14 ENCOUNTER — TELEPHONE (OUTPATIENT)
Dept: FAMILY MEDICINE | Facility: CLINIC | Age: 83
End: 2018-06-14

## 2018-06-14 NOTE — TELEPHONE ENCOUNTER
Reason for Call:  Other Calcium     Detailed comments: [facility] and they request that the pt get permission to crush her calcium tablet and mix into yogurt to take.    Phone Number Patient can be reached at: Home number on file [phone](home)    Best Time: anytime     Can we leave a detailed message on this number? YES    Call taken on 6/14/2018 at 12:02 PM by Mayte Cardona

## 2018-06-14 NOTE — TELEPHONE ENCOUNTER
Q : Can Caltrate calcium be crushed and sprinkled over food? A : Caltrate calcium tablets are designed to be swallowed whole; however, if needed, you can crush any of the Caltrate tablets and mix them into any of your favourite soft foods.  Spoke to Chavo.  Crushing the Caltrate is fine, since the tablets are too large to swallow.  If trouble with the other meds, he will contact pharmacy for [facility].  Annie Slade RN

## 2018-06-14 NOTE — TELEPHONE ENCOUNTER
"Spoke to patient. She asks that I call the main no.at [facility] ([phone]) and ask for nurse Chavo or Shashi.    I called and was transferred to Chavo's .   Patient's medication list shows \"Citracal\", which can be crushed if necessary.   Asked him to call back if she is taking a different brand of Ca+, as we will need to research if it can be crushed.  (can never crush any \"extended-release\" type medications.)    Annie Slade RN    "

## 2018-06-15 DIAGNOSIS — M81.0 OSTEOPOROSIS, UNSPECIFIED OSTEOPOROSIS TYPE, UNSPECIFIED PATHOLOGICAL FRACTURE PRESENCE: ICD-10-CM

## 2018-06-15 NOTE — TELEPHONE ENCOUNTER
"Requested Prescriptions   Pending Prescriptions Disp Refills     calcium citrate-vitamin D (CITRACAL) 315-250 MG-UNIT TABS per tablet  Last Written Prescription Date:  7/2/17  Last Fill Quantity: 180 tablet,  # refills: 3   Last office visit: 1/29/2018 with prescribing provider:  Wilman   Future Office Visit:     180 tablet 3     Sig: Take 1 tablet by mouth 2 times daily -Take with food    Vitamin Supplements (Adult) Protocol Passed    6/15/2018 11:09 AM       Passed - High dose Vitamin D not ordered       Passed - Recent (12 mo) or future (30 days) visit within the authorizing provider's specialty    Patient had office visit in the last 12 months or has a visit in the next 30 days with authorizing provider or within the authorizing provider's specialty.  See \"Patient Info\" tab in inbasket, or \"Choose Columns\" in Meds & Orders section of the refill encounter.              "

## 2018-06-15 NOTE — TELEPHONE ENCOUNTER
Prescription approved per [facility] Refill Protocol.  Francisca Irizarry RN

## 2018-07-02 ENCOUNTER — OFFICE VISIT (OUTPATIENT)
Dept: FAMILY MEDICINE | Facility: CLINIC | Age: 83
End: 2018-07-02
Payer: COMMERCIAL

## 2018-07-02 VITALS
HEART RATE: 67 BPM | HEIGHT: 65 IN | TEMPERATURE: 97.7 F | OXYGEN SATURATION: 98 % | DIASTOLIC BLOOD PRESSURE: 77 MMHG | SYSTOLIC BLOOD PRESSURE: 171 MMHG

## 2018-07-02 DIAGNOSIS — M67.80 TENDON CYSTS: ICD-10-CM

## 2018-07-02 DIAGNOSIS — N18.30 CKD (CHRONIC KIDNEY DISEASE) STAGE 3, GFR 30-59 ML/MIN (H): ICD-10-CM

## 2018-07-02 DIAGNOSIS — G50.0 TRIGEMINAL NEURALGIA: ICD-10-CM

## 2018-07-02 DIAGNOSIS — R26.89 BALANCE PROBLEM: ICD-10-CM

## 2018-07-02 DIAGNOSIS — R41.0 CONFUSION: Primary | ICD-10-CM

## 2018-07-02 LAB
ERYTHROCYTE [DISTWIDTH] IN BLOOD BY AUTOMATED COUNT: 14.5 % (ref 10–15)
HCT VFR BLD AUTO: 22 % (ref 35–47)
HGB BLD-MCNC: 9.7 G/DL (ref 11.7–15.7)
MCH RBC QN AUTO: 46.4 PG (ref 26.5–33)
MCHC RBC AUTO-ENTMCNC: 44.1 G/DL (ref 31.5–36.5)
MCV RBC AUTO: 105 FL (ref 78–100)
PLATELET # BLD AUTO: 428 10E9/L (ref 150–450)
RBC # BLD AUTO: 2.09 10E12/L (ref 3.8–5.2)
WBC # BLD AUTO: 7.7 10E9/L (ref 4–11)

## 2018-07-02 PROCEDURE — 80053 COMPREHEN METABOLIC PANEL: CPT | Performed by: INTERNAL MEDICINE

## 2018-07-02 PROCEDURE — 36415 COLL VENOUS BLD VENIPUNCTURE: CPT | Performed by: INTERNAL MEDICINE

## 2018-07-02 PROCEDURE — 84443 ASSAY THYROID STIM HORMONE: CPT | Performed by: INTERNAL MEDICINE

## 2018-07-02 PROCEDURE — 99214 OFFICE O/P EST MOD 30 MIN: CPT | Performed by: INTERNAL MEDICINE

## 2018-07-02 PROCEDURE — 85027 COMPLETE CBC AUTOMATED: CPT | Performed by: INTERNAL MEDICINE

## 2018-07-02 RX ORDER — CARBAMAZEPINE 200 MG/1
200 TABLET ORAL 2 TIMES DAILY
Qty: 120 TABLET | Refills: 0 | COMMUNITY
Start: 2018-07-02 | End: 2018-07-09

## 2018-07-02 NOTE — LETTER
[address] [facility]  [address]  [place]  [address]  Tel: [phone]    July 5, 2018    Kalli Brown  [address]  [place] [address]        Dear Ms. Brown,    I had the opportunity to review your recent labs and a summary of your labs reads as follows:    Your complete blood counts show stable anemia, normal white blood cell count and platelets.  Your comprehensive metabolic panel showed stable renal function, normal liver function (low protein production may be sign of nutritional deficiency), and normal fasting blood glucose indicating no evidence of diabetes mellitus.  Your thyroid function is stable.     If you have any further questions or problems, please contact our office.      Sincerely,    Man Stovall MD/ Gerda Sevilla CMA  Results for orders placed or performed in visit on 07/02/18   COMPREHENSIVE METABOLIC PANEL   Result Value Ref Range    Sodium 138 133 - 144 mmol/L    Potassium 4.9 3.4 - 5.3 mmol/L    Chloride 106 94 - 109 mmol/L    Carbon Dioxide 23 20 - 32 mmol/L    Anion Gap 9 3 - 14 mmol/L    Glucose 93 70 - 99 mg/dL    Urea Nitrogen 32 (H) 7 - 30 mg/dL    Creatinine 1.02 0.52 - 1.04 mg/dL    GFR Estimate 51 (L) >60 mL/min/1.7m2    GFR Estimate If Black 62 >60 mL/min/1.7m2    Calcium 8.8 8.5 - 10.1 mg/dL    Bilirubin Total 0.4 0.2 - 1.3 mg/dL    Albumin 3.1 (L) 3.4 - 5.0 g/dL    Protein Total 5.7 (L) 6.8 - 8.8 g/dL    Alkaline Phosphatase 145 40 - 150 U/L    ALT 25 0 - 50 U/L    AST 32 0 - 45 U/L   CBC with platelets   Result Value Ref Range    WBC 7.7 4.0 - 11.0 10e9/L    RBC Count 2.09 (L) 3.8 - 5.2 10e12/L    Hemoglobin 9.7 (L) 11.7 - 15.7 g/dL    Hematocrit 22.0 (L) 35.0 - 47.0 %     (H) 78 - 100 fl    MCH 46.4 (H) 26.5 - 33.0 pg    MCHC 44.1 (H) 31.5 - 36.5 g/dL    RDW 14.5 10.0 - 15.0 %    Platelet Count 428 150 - 450 10e9/L   TSH   Result Value Ref Range    TSH 2.37 0.40 - 4.00 mU/L               Enclosure: Lab Results

## 2018-07-02 NOTE — PROGRESS NOTES
"[facility]  CLINIC PROGRESS NOTE    Subjective:  Word Finding   Cognitive Changes   Kalli Brown has had a noticeable loss of strength over the past few months and did follow up in nephrology on June 12.  She did have an aranesp injection shortly thereafter.  Since the beginning of June her strength has really declined as has memory, cognitive function and word finding ability.  She did have a chest x-ray that showed possible pneumonitis on June 12 and treated with azithromycin course which did not change much of her symptoms.  She denies cough or shortness of breath at this time.   She also has no concerns with regards to dysuria, but has had difficulty with urinary incontinence.     She did have a fall a couple of months ago and prior to November she had a fall and did have a head CT that was negative for acute concerns.   Following her most recent fall she did not have any imaging, but did have some swelling over her left cheek.        Past medical history, medications, allergies, social history, family history reviewed and updated in [facility] as of 7/2/2018 .    ROS  CONSTITUTIONAL: no fatigue, no unexpected change in weight  SKIN: no worrisome rashes, no worrisome moles, no worrisome lesions  EYES: no acute vision problems or changes  ENT: no ear problems, no mouth problems, no throat problems  RESP: no significant cough, no shortness of breath  CV: no chest pain, no palpitations, no new or worsening peripheral edema  GI: no nausea, no vomiting, no constipation, no diarrhea  : no frequency, no dysuria, no hematuria  MS: no claudication, no myalgias, no joint aches  PSYCHIATRIC: no changes in mood or affect      Objective:  Vitals  /77 (BP Location: Right arm, Patient Position: Chair, Cuff Size: Adult Small)  Pulse 67  Temp 97.7  F (36.5  C) (Tympanic)  Ht 5' 5\" (1.651 m)  SpO2 98%  GEN: Alert Oriented to person and place  HEENT: Atraumatic, normocephalic, neck supple, no " thyromegaly, negative cervical adenopathy  CV: RRR no murmurs or rubs  PULM: Crackles right sided  ABD: Soft, nontender nondistended, no hepatosplenomegally  SKIN: No visible skin lesion or ulcerations  EXT: 1+ edema bilateral lower extremities  NEURO: Gait and station deferred, No focal neurologic deficits  PSYCH: Mood good, affect mood congruent    No flowsheet data found.    No images are attached to the encounter.    No results found for this or any previous visit (from the past 24 hour(s)).    Assessment/Plan:  Patient Instructions   (R41.0) Confusion  (primary encounter diagnosis)  Comment: Appears to be multifactorial and possibly related to anemia, renal dysfunction. We will check labs today to identify maninder easily reversible causes and consider head CT - [facility] phone #[phone] to schedule.  Pending results I would recommend follow up in palliative care clinic to review goals of care and address possible medication adjustments  Plan: COMPREHENSIVE METABOLIC PANEL, CBC with         platelets, TSH, CT Head w/o Contrast,         PALLIATIVE CARE REFERRAL            (N18.3) CKD (chronic kidney disease) stage 3, GFR 30-59 ml/min  Comment: Noted, nephrology has ordered recent labs and intends only to follow by phone at this time  Plan: PALLIATIVE CARE REFERRAL            (R26.89) Balance problem  Comment: Plan to discuss with palliative care - physical therapy has been refused   Plan: PALLIATIVE CARE REFERRAL            (G50.0) Trigeminal neuralgia  Comment: Doing well with carbamazepine   Plan: PALLIATIVE CARE REFERRAL            (M67.80) Tendon cysts  Comment; Referral to orthopedics given   Plan: ORTHOPEDICS ADULT REFERRAL, PALLIATIVE CARE         REFERRAL            Plan of care discussed with mireya Tony and Fuad     Follow up in palliative care    25 minutes spent with patient.  Over 50% of time counseling      Disclaimer: This note consists of symbols derived from keyboarding, dictation and/or  voice recognition software. As a result, there may be errors in the script that have gone undetected. Please consider this when interpreting information found in this chart.    Man Stovall MD  [phone]

## 2018-07-02 NOTE — NURSING NOTE
"Chief Complaint   Patient presents with     Failing Health        Initial /77 (BP Location: Right arm, Patient Position: Chair, Cuff Size: Adult Small)  Pulse 67  Temp 97.7  F (36.5  C) (Tympanic)  Ht 5' 5\" (1.651 m)  SpO2 98% Estimated body mass index is 18.29 kg/(m^2) as calculated from the following:    Height as of 1/29/18: 5' 2\" (1.575 m).    Weight as of 1/29/18: 100 lb (45.4 kg)..    BP completed using cuff size: small regular  MEDICATIONS REVIEWED  SOCIAL AND FAMILY HX REVIEWED  Vashti Cassidy CMA  "

## 2018-07-02 NOTE — PATIENT INSTRUCTIONS
(R41.0) Confusion  (primary encounter diagnosis)  Comment: Appears to be multifactorial and possibly related to anemia, renal dysfunction. We will check labs today to identify maninder easily reversible causes and consider head CT - [facility] phone #[phone] to schedule.  Pending results I would recommend follow up in palliative care clinic to review goals of care and address possible medication adjustments  Plan: COMPREHENSIVE METABOLIC PANEL, CBC with         platelets, TSH, CT Head w/o Contrast,         PALLIATIVE CARE REFERRAL            (N18.3) CKD (chronic kidney disease) stage 3, GFR 30-59 ml/min  Comment: Noted, nephrology has ordered recent labs and intends only to follow by phone at this time  Plan: PALLIATIVE CARE REFERRAL            (R26.89) Balance problem  Comment: Plan to discuss with palliative care - physical therapy has been refused   Plan: PALLIATIVE CARE REFERRAL            (G50.0) Trigeminal neuralgia  Comment: Doing well with carbamazepine   Plan: PALLIATIVE CARE REFERRAL            (M67.80) Tendon cysts  Comment; Referral to orthopedics given   Plan: ORTHOPEDICS ADULT REFERRAL, PALLIATIVE CARE         REFERRAL

## 2018-07-02 NOTE — MR AVS SNAPSHOT
After Visit Summary   7/2/2018    Kalli Brown    MRN: [id number]           Patient Information     Date Of Birth          2/26/1929        Visit Information        Provider Department      7/2/2018 11:30 AM Man Stovall MD [facility]        Today's Diagnoses     Confusion    -  1    CKD (chronic kidney disease) stage 3, GFR 30-59 ml/min        Balance problem        Trigeminal neuralgia        Tendon cysts          Care Instructions    (R41.0) Confusion  (primary encounter diagnosis)  Comment: Appears to be multifactorial and possibly related to anemia, renal dysfunction. We will check labs today to identify maninder easily reversible causes and consider head CT - [facility] phone #[phone] to schedule.  Pending results I would recommend follow up in palliative care clinic to review goals of care and address possible medication adjustments  Plan: COMPREHENSIVE METABOLIC PANEL, CBC with         platelets, TSH, CT Head w/o Contrast,         PALLIATIVE CARE REFERRAL            (N18.3) CKD (chronic kidney disease) stage 3, GFR 30-59 ml/min  Comment: Noted, nephrology has ordered recent labs and intends only to follow by phone at this time  Plan: PALLIATIVE CARE REFERRAL            (R26.89) Balance problem  Comment: Plan to discuss with palliative care - physical therapy has been refused   Plan: PALLIATIVE CARE REFERRAL            (G50.0) Trigeminal neuralgia  Comment: Doing well with carbamazepine   Plan: PALLIATIVE CARE REFERRAL            (M67.80) Tendon cysts  Comment; Referral to orthopedics given   Plan: ORTHOPEDICS ADULT REFERRAL, PALLIATIVE CARE         REFERRAL                     Follow-ups after your visit        Additional Services     ORTHOPEDICS ADULT REFERRAL       Your provider has referred you to: [facility] [place] [phone]   [email or web address]    Please be aware that coverage of these services is subject to  the terms and limitations of your health insurance plan.  Call member services at your health plan with any benefit or coverage questions.      Please bring the following to your appointment:    >>   Any x-rays, CTs or MRIs which have been performed.  Contact the facility where they were done to arrange for  prior to your scheduled appointment.    >>   List of current medications   >>   This referral request   >>   Any documents/labs given to you for this referral            PALLIATIVE CARE REFERRAL       Your provider has referred you to Palliative Care Services.    To schedule an Outpatient Palliative Care Referral appointment, please call: [facility]: [facility] and [facility] [phone]   [email or web address].    Please be aware that coverage of these services is subject to the terms and limitations of your health insurance plan.  Call member services at your health plan with any benefit or coverage questions.      Please bring the following with you to your appointment:    (1) Any X-Rays, CTs or MRIs which have been performed.  Contact the facility where they were done to arrange for  prior to your scheduled appointment.   (2) If you have recently seen a provider outside of the [facility], please bring your most recent clinic note and/or imaging results  (3) List of current medications - please bring ALL of the medications that you are currently taking (in their original bottles) to your appointment  (4) This referral request  (5) Any documents/labs given to you for this referral    Services Requested: Consult and make recommendations, Evaluate and treat symptoms (including writing prescriptions) and Explore goals of care    Please complete the following questions:  1. What is patient's life-limiting diagnosis? Chronic kidney disease, Nephropathy, memory loss, Weakness  2. What is the reason for the referral? As above   3. What  "is the patient's prognosis? Guarded     Palliative Care Definition:    Palliative Care is specialized medical care for people with serious illness.  This type of care is focused on providing patients with relief from symptoms, pain and stresses of serious illness - whatever the diagnosis may be.  The goal of Palliative Care is to improve quality of life for both the patient and the family.  Palliative Care is provided by a team of doctors, nurses and other specialists who work with the patient's other doctors to provide an extra layer of support.  Palliative Care is appropriate at any age and at any stage in a serious illness, and can be provided together with curative treatment.                  Future tests that were ordered for you today     Open Future Orders        Priority Expected Expires Ordered    CT Head w/o Contrast Routine  7/2/2019 7/2/2018            Who to contact     If you have questions or need follow up information about today's clinic visit or your schedule please contact [facility] directly at [phone].  Normal or non-critical lab and imaging results will be communicated to you by MyChart, letter or phone within 4 business days after the clinic has received the results. If you do not hear from us within 7 days, please contact the clinic through [employer] or phone. If you have a critical or abnormal lab result, we will notify you by phone as soon as possible.  Submit refill requests through [employer] or call your pharmacy and they will forward the refill request to us. Please allow 3 business days for your refill to be completed.          Additional Information About Your Visit        Care EveryWhere ID     This is your Care EveryWhere ID. This could be used by other organizations to access your [facility] medical records  [id number]        Your Vitals Were     Pulse Temperature Height Pulse Oximetry          67 97.7  F (36.5  C) (Tympanic) 5' 5\" (1.651 m) 98%         Blood Pressure " from Last 3 Encounters:   07/02/18 171/77   05/19/18 148/77   01/29/18 129/68    Weight from Last 3 Encounters:   01/29/18 100 lb (45.4 kg)   11/30/17 103 lb (46.7 kg)   07/05/17 110 lb (49.9 kg)              We Performed the Following     CBC with platelets     COMPREHENSIVE METABOLIC PANEL     ORTHOPEDICS ADULT REFERRAL     PALLIATIVE CARE REFERRAL     TSH          Today's Medication Changes          These changes are accurate as of 7/2/18 12:15 PM.  If you have any questions, ask your nurse or doctor.               These medicines have changed or have updated prescriptions.        Dose/Directions    carBAMazepine 200 MG tablet   Commonly known as:  TEGretol   This may have changed:  additional instructions   Changed by:  Man Stovall MD        Dose:  200 mg   Take 1 tablet (200 mg) by mouth 2 times daily Take 200 mg in the AM and 300 mg in the PM.   Quantity:  120 tablet   Refills:  0                Primary Care Provider Office Phone # Fax #    Man Stovall [phone] [address] [facility] [address] [address]        Equal Access to Services     [facility]: Hadii juve ku hadasho Sosherwin, waaxda luqshakira, qaybta kaalmaene cantu, willis georges . So [facility] [phone].    ATENCIÓN: Si habla español, tiene a chiu disposición servicios gratuitos de asistencia lingüística. [facility] [phone].    We comply with applicable federal civil rights laws and [place] laws. We do not discriminate on the basis of race, color, national origin, age, disability, sex, sexual orientation, or gender identity.            Thank you!     Thank you for choosing [facility]  for your care. Our goal is always to provide you with excellent care. Hearing back from our patients is one way we can continue to improve our services. Please take a few minutes to complete the written survey that you may receive in the mail after your visit with us. Thank  you!             Your Updated Medication List - Protect others around you: Learn how to safely use, store and throw away your medicines at www.disposemymeds.org.          This list is accurate as of 7/2/18 12:15 PM.  Always use your most recent med list.                   Brand Name Dispense Instructions for use Diagnosis    acetaminophen 500 MG tablet    TYLENOL    540 tablet    Take 2 tablets (1,000 mg) by mouth 3 times daily    Chronic low back pain, unspecified back pain laterality, with sciatica presence unspecified       amLODIPine 10 MG tablet    NORVASC    30 tablet    Take 1 tablet (10 mg) by mouth daily    Benign essential hypertension       ASPIRIN LOW DOSE 81 MG chewable tablet   Generic drug:  aspirin     30 tablet    1 TAB BY MOUTH DAILY (DX:ANTICOAGULATION)    Benign essential hypertension       calcium citrate-vitamin D 315-250 MG-UNIT Tabs per tablet    CITRACAL    180 tablet    Take 1 tablet by mouth 2 times daily -Take with food    Osteoporosis, unspecified osteoporosis type, unspecified pathological fracture presence       carBAMazepine 200 MG tablet    TEGretol    120 tablet    Take 1 tablet (200 mg) by mouth 2 times daily Take 200 mg in the AM and 300 mg in the PM.        docusate sodium 250 MG capsule    COLACE    90 capsule    Take 1 capsule (250 mg) by mouth daily as needed for constipation    Constipation, unspecified constipation type       ferrous sulfate 325 (65 Fe) MG tablet    IRON    60 tablet    Take 1 tablet (325 mg) by mouth 2 times daily (with meals)    Iron deficiency anemia, unspecified iron deficiency anemia type       HALLS COUGH DROPS 7 MG Lozg   Generic drug:  menthol     84 lozenge    Take 1 lozenge (7 mg) by mouth every hour as needed for cough        irbesartan-hydrochlorothiazide 300-12.5 MG per tablet    AVALIDE     Take 1 tablet by mouth daily        LASIX 80 MG tablet   Generic drug:  furosemide     30 tablet    Take 1 tablet (80 mg) by mouth daily        latanoprost  0.005 % ophthalmic solution    XALATAN     Place 1 drop into both eyes At Bedtime        mirtazapine 7.5 MG Tabs tablet    REMERON    90 tablet    Take 1 tablet (7.5 mg) by mouth At Bedtime    Primary insomnia       polyethylene glycol powder    MIRALAX    510 g    Take 17 g by mouth daily as needed    Constipation, unspecified constipation type       sertraline 25 MG tablet    ZOLOFT    30 tablet    Take 1 tablet (25 mg) by mouth daily    DAVID (generalized anxiety disorder)       VITAMIN D (CHOLECALCIFEROL) PO      Take 1,000 Units by mouth daily

## 2018-07-03 ENCOUNTER — TELEPHONE (OUTPATIENT)
Dept: FAMILY MEDICINE | Facility: CLINIC | Age: 83
End: 2018-07-03

## 2018-07-03 DIAGNOSIS — G89.29 CHRONIC LOW BACK PAIN, UNSPECIFIED BACK PAIN LATERALITY, WITH SCIATICA PRESENCE UNSPECIFIED: ICD-10-CM

## 2018-07-03 DIAGNOSIS — M54.5 CHRONIC LOW BACK PAIN, UNSPECIFIED BACK PAIN LATERALITY, WITH SCIATICA PRESENCE UNSPECIFIED: ICD-10-CM

## 2018-07-03 LAB
ALBUMIN SERPL-MCNC: 3.1 G/DL (ref 3.4–5)
ALP SERPL-CCNC: 145 U/L (ref 40–150)
ALT SERPL W P-5'-P-CCNC: 25 U/L (ref 0–50)
ANION GAP SERPL CALCULATED.3IONS-SCNC: 9 MMOL/L (ref 3–14)
AST SERPL W P-5'-P-CCNC: 32 U/L (ref 0–45)
BILIRUB SERPL-MCNC: 0.4 MG/DL (ref 0.2–1.3)
BUN SERPL-MCNC: 32 MG/DL (ref 7–30)
CALCIUM SERPL-MCNC: 8.8 MG/DL (ref 8.5–10.1)
CHLORIDE SERPL-SCNC: 106 MMOL/L (ref 94–109)
CO2 SERPL-SCNC: 23 MMOL/L (ref 20–32)
CREAT SERPL-MCNC: 1.02 MG/DL (ref 0.52–1.04)
GFR SERPL CREATININE-BSD FRML MDRD: 51 ML/MIN/1.7M2
GLUCOSE SERPL-MCNC: 93 MG/DL (ref 70–99)
POTASSIUM SERPL-SCNC: 4.9 MMOL/L (ref 3.4–5.3)
PROT SERPL-MCNC: 5.7 G/DL (ref 6.8–8.8)
SODIUM SERPL-SCNC: 138 MMOL/L (ref 133–144)
TSH SERPL DL<=0.005 MIU/L-ACNC: 2.37 MU/L (ref 0.4–4)

## 2018-07-03 NOTE — TELEPHONE ENCOUNTER
Left message for son Chavo to call back for hgb results per Dr. Stovall in message below.        Can we call Kalli Brown's son Chavo and let him know that     Her hemoglobin has improved up to 9.7 from 8.0 -

## 2018-07-03 NOTE — TELEPHONE ENCOUNTER
"Acetaminophen 500 mg    Last Written Prescription Date:  01/29/18  Last Fill Quantity: 540 tablets,  # refills: 0   Last office visit: 7/2/2018 with prescribing provider:  Wilman   Future Office Visit:      Requested Prescriptions   Pending Prescriptions Disp Refills     acetaminophen (TYLENOL) 500 MG tablet 540 tablet 0     Sig: Take 2 tablets (1,000 mg) by mouth 3 times daily    Analgesics (Non-Narcotic Tylenol and ASA Only) Passed    7/3/2018  2:48 PM       Passed - Recent (12 mo) or future (30 days) visit within the authorizing provider's specialty    Patient had office visit in the last 12 months or has a visit in the next 30 days with authorizing provider or within the authorizing provider's specialty.  See \"Patient Info\" tab in inbasket, or \"Choose Columns\" in Meds & Orders section of the refill encounter.           Passed - Patient is 7 months old or older    If patient is a peds patient of the age 7 mos -12 years, ok to refill using weight-based dosing.     If >3g daily and/or sig is not \"prn\", check for liver enzymes. If normal in the last year, ok to refill.  If not, refer to the provider.            "

## 2018-07-05 DIAGNOSIS — I10 BENIGN ESSENTIAL HYPERTENSION: Primary | ICD-10-CM

## 2018-07-05 DIAGNOSIS — F41.1 GAD (GENERALIZED ANXIETY DISORDER): ICD-10-CM

## 2018-07-05 DIAGNOSIS — F51.01 PRIMARY INSOMNIA: ICD-10-CM

## 2018-07-05 RX ORDER — ACETAMINOPHEN 500 MG
1000 TABLET ORAL 3 TIMES DAILY
Qty: 540 TABLET | Refills: 0 | Status: SHIPPED | OUTPATIENT
Start: 2018-07-05 | End: 2019-01-01

## 2018-07-05 NOTE — TELEPHONE ENCOUNTER
"mirtazapine (REMERON) 7.5 MG TABS tablet 90 tablet 3 9/6/2016     Last Written Prescription Date:  09/06/2016  Last Fill Quantity: 90,  # refills: 3     furosemide (LASIX) 80 MG tablet 30 tablet  1/29/2018  No      Sig - Route: Take 1 tablet (80 mg) by mouth daily - Oral     Class: Historical     Last Written Prescription Date:  Unknown  Last Fill Quantity: Unknown,  # refills: Unknown       sertraline (ZOLOFT) 25 MG tablet 30 tablet 11 7/6/2017     Last Written Prescription Date:  07/06/2017  Last Fill Quantity: 30,  # refills: 11   Last office visit: 7/2/2018 with prescribing provider:     Future Office Visit:  Unknown  PHQ-9 SCORE 5/8/2017   Total Score 3       Requested Prescriptions   Pending Prescriptions Disp Refills     mirtazapine (REMERON) 7.5 MG TABS tablet 90 tablet 3     Sig: Take 1 tablet (7.5 mg) by mouth At Bedtime    Atypical Antidepressants Protocol Passed    7/5/2018  2:10 PM       Passed - Recent (12 mo) or future (30 days) visit within the authorizing provider's specialty    Patient had office visit in the last 12 months or has a visit in the next 30 days with authorizing provider or within the authorizing provider's specialty.  See \"Patient Info\" tab in inbasket, or \"Choose Columns\" in Meds & Orders section of the refill encounter.           Passed - Patient is age 18 or older       Passed - No active pregnancy on record       Passed - No positive pregnancy test in past 12 mos        furosemide (LASIX) 80 MG tablet 30 tablet      Sig: Take 1 tablet (80 mg) by mouth daily    Diuretics (Including Combos) Protocol Failed    7/5/2018  2:10 PM       Failed - Blood pressure under 140/90 in past 12 months    BP Readings from Last 3 Encounters:   07/02/18 171/77   05/19/18 148/77   01/29/18 129/68                Passed - Recent (12 mo) or future (30 days) visit within the authorizing provider's specialty    Patient had office visit in the last 12 months or has a visit in the next 30 days " "with authorizing provider or within the authorizing provider's specialty.  See \"Patient Info\" tab in inbasket, or \"Choose Columns\" in Meds & Orders section of the refill encounter.           Passed - Patient is age 18 or older       Passed - No active pregancy on record       Passed - Normal serum creatinine on file in past 12 months    Recent Labs   Lab Test  07/02/18   1227   CR  1.02             Passed - Normal serum potassium on file in past 12 months    Recent Labs   Lab Test  07/02/18   1227   POTASSIUM  4.9                   Passed - Normal serum sodium on file in past 12 months    Recent Labs   Lab Test  07/02/18   1227   NA  138             Passed - No positive pregnancy test in past 12 months        sertraline (ZOLOFT) 25 MG tablet 30 tablet 11     Sig: Take 1 tablet (25 mg) by mouth daily    SSRIs Protocol Passed    7/5/2018  2:10 PM       Passed - Recent (12 mo) or future (30 days) visit within the authorizing provider's specialty    Patient had office visit in the last 12 months or has a visit in the next 30 days with authorizing provider or within the authorizing provider's specialty.  See \"Patient Info\" tab in inbasket, or \"Choose Columns\" in Meds & Orders section of the refill encounter.           Passed - Patient is age 18 or older       Passed - No active pregnancy on record       Passed - No positive pregnancy test in last 12 months          "

## 2018-07-09 ENCOUNTER — TELEPHONE (OUTPATIENT)
Dept: FAMILY MEDICINE | Facility: CLINIC | Age: 83
End: 2018-07-09

## 2018-07-09 DIAGNOSIS — G50.0 TRIGEMINAL NEURALGIA: Primary | ICD-10-CM

## 2018-07-09 RX ORDER — MIRTAZAPINE 7.5 MG/1
7.5 TABLET, FILM COATED ORAL AT BEDTIME
Qty: 90 TABLET | Refills: 3 | Status: SHIPPED | OUTPATIENT
Start: 2018-07-09 | End: 2019-01-01

## 2018-07-09 RX ORDER — FUROSEMIDE 80 MG
80 TABLET ORAL DAILY
Qty: 90 TABLET | Refills: 3 | Status: SHIPPED | OUTPATIENT
Start: 2018-07-09 | End: 2019-01-01

## 2018-07-09 RX ORDER — SERTRALINE HYDROCHLORIDE 25 MG/1
25 TABLET, FILM COATED ORAL DAILY
Qty: 90 TABLET | Refills: 1 | Status: SHIPPED | OUTPATIENT
Start: 2018-07-09 | End: 2018-11-07

## 2018-07-09 RX ORDER — CARBAMAZEPINE 200 MG/1
TABLET ORAL
Qty: 75 TABLET | Refills: 11 | Status: SHIPPED | OUTPATIENT
Start: 2018-07-09 | End: 2019-01-01

## 2018-07-09 NOTE — TELEPHONE ENCOUNTER
Sertraline:   Prescription approved per [facility] Refill Protocol.    Lasix:  Routing refill request to provider for review/approval because:  Medication is reported/historical    Remeron:  Routing refill request to provider for review/approval because:  A break in medication - should have run out Sept 2017    Luz ALVARADO RN

## 2018-07-09 NOTE — TELEPHONE ENCOUNTER
Fax from [facility] requesting refill of    Tegretol 200mg, SIG take 200mg in the morning and 300mg in the afternoon.  #75 with refills    Dr Stovall - will you authorize or is Dr Castro from [facility] managing this Rx?    Mediation is historical in chart.    Pao Qureshi, RT (R)

## 2018-07-22 ENCOUNTER — HOSPITAL ENCOUNTER (EMERGENCY)
Facility: CLINIC | Age: 83
Discharge: HOME | End: 2018-07-23
Attending: EMERGENCY MEDICINE | Admitting: EMERGENCY MEDICINE
Payer: MEDICARE

## 2018-07-22 ENCOUNTER — APPOINTMENT (OUTPATIENT)
Dept: GENERAL RADIOLOGY | Facility: CLINIC | Age: 83
End: 2018-07-22
Attending: EMERGENCY MEDICINE
Payer: MEDICARE

## 2018-07-22 DIAGNOSIS — S09.90XA CLOSED HEAD INJURY, INITIAL ENCOUNTER: ICD-10-CM

## 2018-07-22 DIAGNOSIS — S70.01XA CONTUSION OF RIGHT HIP, INITIAL ENCOUNTER: ICD-10-CM

## 2018-07-22 PROCEDURE — 73502 X-RAY EXAM HIP UNI 2-3 VIEWS: CPT

## 2018-07-22 PROCEDURE — 99284 EMERGENCY DEPT VISIT MOD MDM: CPT | Mod: 25

## 2018-07-22 ASSESSMENT — ENCOUNTER SYMPTOMS
ARTHRALGIAS: 1
HEADACHES: 1
NECK PAIN: 0
BACK PAIN: 0

## 2018-07-22 NOTE — ED AVS SNAPSHOT
Emergency Department    [address] [place] [address]    Phone:  [phone]    Fax:  [phone]                                       Kalli Brown   MRN: [id number]    Department:   Emergency Department   Date of Visit:  7/22/2018           After Visit Summary Signature Page     I have received my discharge instructions, and my questions have been answered. I have discussed any challenges I see with this plan with the nurse or doctor.    ..........................................................................................................................................  Patient/Patient Representative Signature      ..........................................................................................................................................  Patient Representative Print Name and Relationship to Patient    ..................................................               ................................................  Date                                            Time    ..........................................................................................................................................  Reviewed by Signature/Title    ...................................................              ..............................................  Date                                                            Time

## 2018-07-22 NOTE — ED AVS SNAPSHOT
Emergency Department    [address] [facility] [address]    Phone:  [phone]    Fax:  [phone]                                       Kalli Brown   MRN: [id number]    Department:   Emergency Department   Date of Visit:  7/22/2018           Patient Information     Date Of Birth          2/26/1929        Your diagnoses for this visit were:     Closed head injury, initial encounter     Contusion of right hip, initial encounter        You were seen by Scott Gasca MD.      Follow-up Information     Follow up with Man Stovall MD In 1 week.    Specialty:  Internal Medicine    Contact information:    [address] [id number] [phone]          Follow up with  Emergency Department.    Specialty:  EMERGENCY MEDICINE    Why:  As needed, If symptoms worsen    Contact information:    [address] [facility] [id number] [phone]        Discharge Instructions       Discharge Instructions  Head Injury    You have been seen today for a head injury. Your evaluation included a history and physical examination. You may have had a CT (CAT) scan performed, though most head injuries do not require a scan. Based on this evaluation, your provider today does not feel that your head injury is serious.    Generally, every Emergency Department visit should have a follow-up clinic visit with either a primary or a specialty clinic/provider. Please follow-up as instructed by your emergency provider today.  Return to the Emergency Department if:    You are confused or you are not acting right.    Your headache gets worse or you start to have a really bad headache even with your recommended treatment plan.    You vomit (throw up) more than once.    You have a seizure.    You have trouble walking.    You have weakness or paralysis (cannot move) in an arm or a leg.    You have blood or fluid coming from your ears or nose.    You have new  symptoms or anything that worries you.    Sleeping:  It is okay for you to sleep, but someone should wake you up if instructed by your provider, and someone should check on you at your usual time to wake up.     Activity:    Do not drive for at least 24 hours.    Do not drive if you have dizzy spells or trouble concentrating, or remembering things.    Do not return to any contact sports until cleared by your regular provider.     MORE INFORMATION:    Concussion:  A concussion is a minor head injury that may cause temporary problems with the way the brain works. Although concussions are important, they are generally not an emergency or a reason that a person needs to be hospitalized. Some concussion symptoms include confusion, amnesia (forgetful), nausea (sick to your stomach) and vomiting (throwing up), dizziness, fatigue, memory or concentration problems, irritability and sleep problems. For most people, concussions are mild and temporary but some will have more severe and persistent symptoms that require on-going care and treatment.  CT Scans: Your evaluation today may have included a CT scan (CAT scan) to look for things like bleeding or a skull fracture (broken bone).  CT scans involve radiation and too many CT scans can cause serious health problems like cancer, especially in children.  Because of this, your provider may not have ordered a CT scan today if they think you are at low risk for a serious or life threatening problem.    If you were given a prescription for medicine here today, be sure to read all of the information (including the package insert) that comes with your prescription.  This will include important information about the medicine, its side effects, and any warnings that you need to know about.  The pharmacist who fills the prescription can provide more information and answer questions you may have about the medicine.  If you have questions or concerns that the pharmacist cannot address,  please call or return to the Emergency Department.     Remember that you can always come back to the Emergency Department if you are not able to see your regular provider in the amount of time listed above, if you get any new symptoms, or if there is anything that worries you.      Lower Extremity Contusion  You have a contusion (bruise) of a lower extremity (leg, knee, ankle, foot, or toe). Symptoms include pain, swelling, and skin discoloration. No bones are broken. This injury may take from a few days to a few weeks to heal.  During that time, the bruise may change from reddish in color, to purple-blue, to green-yellow, to yellow-brown.  Home care    Unless another medicine was prescribed, you can take acetaminophen, ibuprofen, or naproxen to control pain. (If you have chronic liver or kidney disease or ever had a stomach ulcer or gastrointestinal bleeding, talk with your doctor before using these medicines.)    Elevate the injured area to reduce pain and swelling. As much as possible, sit or lie down with the injured area raised about the level of your heart. This is especially important during the first 48 hours.    Ice the injured area to help reduce pain and swelling. Wrap a cold source (ice pack or ice cubes in a plastic bag) in a thin towel. Apply to the bruised area for 20 minutes every 1 to 2 hours the first day. Continue this 3 to 4 times a day until the pain and swelling goes away.    If crutches have been advised, do not bear full weight on the injured leg until you can do so without pain. You may return to sports when you are able to put full weight and impact on the injured leg without pain.  Follow up  Follow up with your healthcare provider or our staff as advised. Call if you are not improving within the next 1 to 2 weeks.  When to seek medical advice   Call your healthcare provider right away if any of these occur:    Increased pain or swelling    Foot or toes become cold, blue, numb or  tingly    Signs of infection: Warmth, drainage, or increased redness or pain around the injury    Inability to move the injured area     Frequent bruising for unknown reasons  Date Last Reviewed: 2/1/2017 2000-2017 [employer]. [address] [place] [address], [place] [address]. All rights reserved. This information is not intended as a substitute for professional medical care. Always follow your healthcare professional's instructions.          24 Hour Appointment Hotline       To make an appointment at any [facility], call [phone] ([phone]). If you don't have a family doctor or clinic, we will help you find one. [facility] clinics are conveniently located to serve the needs of you and your family.             Review of your medicines      Our records show that you are taking the medicines listed below. If these are incorrect, please call your family doctor or clinic.        Dose / Directions Last dose taken    acetaminophen 500 MG tablet   Commonly known as:  TYLENOL   Dose:  1000 mg   Quantity:  540 tablet        Take 2 tablets (1,000 mg) by mouth 3 times daily   Refills:  0        amLODIPine 10 MG tablet   Commonly known as:  NORVASC   Dose:  10 mg   Quantity:  30 tablet        Take 1 tablet (10 mg) by mouth daily   Refills:  11        ASPIRIN LOW DOSE 81 MG chewable tablet   Quantity:  30 tablet   Generic drug:  aspirin        1 TAB BY MOUTH DAILY (DX:ANTICOAGULATION)   Refills:  11        calcium citrate-vitamin D 315-250 MG-UNIT Tabs per tablet   Commonly known as:  CITRACAL   Dose:  1 tablet   Quantity:  180 tablet        Take 1 tablet by mouth 2 times daily -Take with food   Refills:  3        carBAMazepine 200 MG tablet   Commonly known as:  TEGretol   Quantity:  75 tablet        Take 200 mg in the AM and 300 mg in the PM.   Refills:  11        docusate sodium 250 MG capsule   Commonly known as:  COLACE   Dose:  250 mg   Quantity:  90 capsule        Take 1 capsule (250 mg) by mouth  daily as needed for constipation   Refills:  3        ferrous sulfate 325 (65 Fe) MG tablet   Commonly known as:  IRON   Dose:  325 mg   Quantity:  60 tablet        Take 1 tablet (325 mg) by mouth 2 times daily (with meals)   Refills:  11        furosemide 80 MG tablet   Commonly known as:  LASIX   Dose:  80 mg   Quantity:  90 tablet        Take 1 tablet (80 mg) by mouth daily   Refills:  3        HALLS COUGH DROPS 7 MG Lozg   Dose:  1 lozenge   Quantity:  84 lozenge   Generic drug:  menthol        Take 1 lozenge (7 mg) by mouth every hour as needed for cough   Refills:  0        irbesartan-hydrochlorothiazide 300-12.5 MG per tablet   Commonly known as:  AVALIDE   Dose:  1 tablet        Take 1 tablet by mouth daily   Refills:  0        latanoprost 0.005 % ophthalmic solution   Commonly known as:  XALATAN   Dose:  1 drop        Place 1 drop into both eyes At Bedtime   Refills:  0        mirtazapine 7.5 MG Tabs tablet   Commonly known as:  REMERON   Dose:  7.5 mg   Quantity:  90 tablet        Take 1 tablet (7.5 mg) by mouth At Bedtime   Refills:  3        polyethylene glycol powder   Commonly known as:  MIRALAX   Dose:  17 g   Quantity:  510 g        Take 17 g by mouth daily as needed   Refills:  3        sertraline 25 MG tablet   Commonly known as:  ZOLOFT   Dose:  25 mg   Quantity:  90 tablet        Take 1 tablet (25 mg) by mouth daily   Refills:  1        VITAMIN D (CHOLECALCIFEROL) PO   Dose:  1000 Units        Take 1,000 Units by mouth daily   Refills:  0                Procedures and tests performed during your visit     CT Head w/o Contrast    XR Pelvis w Hip Right 1 View      Orders Needing Specimen Collection     None      Pending Results     Date and Time Order Name Status Description    7/22/2018 2304 XR Pelvis w Hip Right 1 View Preliminary     7/22/2018 2304 CT Head w/o Contrast Preliminary             Pending Culture Results     No orders found for last 3 day(s).            Pending Results Instructions      If you had any lab results that were not finalized at the time of your Discharge, you can call the ED Lab Result RN at [phone]. You will be contacted by this team for any positive Lab results or changes in treatment. The nurses are available 7 days a week from 10A to 6:30P.  You can leave a message 24 hours per day and they will return your call.        Test Results From Your Hospital Stay        7/23/2018 12:36 AM      Narrative     CT HEAD WITHOUT CONTRAST   7/23/2018 12:06 AM     HISTORY: Closed head injury.    COMPARISON: 11/30/2017.    TECHNIQUE: Without intravenous contrast, helical sections were  acquired through the brain. Coronal reconstructions were generated.  Radiation dose for this scan was reduced using automated exposure  control, adjustment of the mA and/or kV according to the patient's  size, or iterative reconstruction technique.    FINDINGS: Moderate diffuse cerebral atrophy. Moderate periventricular  white matter low attenuation, likely relating to chronic small vessel  ischemic disease. No intra-axial mass, mass effect or midline shift.  Normal gray-white matter differentiation. No visualized acute  intra-axial hemorrhage. The cerebral ventricles are normal in caliber.  The basal cisterns are patent. The visualized portions of the  paranasal sinuses and mastoid air cells are unremarkable.        Impression     IMPRESSION: No evidence of acute intracranial trauma.         7/23/2018 12:14 AM      Narrative     PELVIS AND RIGHT HIP 3 VIEWS   7/22/2018 11:32 PM     HISTORY: Fall. Hip pain.    COMPARISON: 2/11/2015.        Impression     IMPRESSION:   1. 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 No evidence of hardware  failure or malalignment.  2. No visualized acute fracture or malalignment of the pelvis or right  hip.                Clinical Quality Measure: Blood Pressure Screening     Your blood pressure was checked while you were in the emergency department today. The last reading we  obtained was  BP: 197/88 . Please read the guidelines below about what these numbers mean and what you should do about them.  If your systolic blood pressure (the top number) is less than 120 and your diastolic blood pressure (the bottom number) is less than 80, then your blood pressure is normal. There is nothing more that you need to do about it.  If your systolic blood pressure (the top number) is 120-139 or your diastolic blood pressure (the bottom number) is 80-89, your blood pressure may be higher than it should be. You should have your blood pressure rechecked within a year by a primary care provider.  If your systolic blood pressure (the top number) is 140 or greater or your diastolic blood pressure (the bottom number) is 90 or greater, you may have high blood pressure. High blood pressure is treatable, but if left untreated over time it can put you at risk for heart attack, stroke, or kidney failure. You should have your blood pressure rechecked by a primary care provider within the next 4 weeks.  If your provider in the emergency department today gave you specific instructions to follow-up with your doctor or provider even sooner than that, you should follow that instruction and not wait for up to 4 weeks for your follow-up visit.        Thank you for choosing [facility]       Thank you for choosing [facility] for your care. Our goal is always to provide you with excellent care. Hearing back from our patients is one way we can continue to improve our services. Please take a few minutes to complete the written survey that you may receive in the mail after you visit with us. Thank you!        Care EveryWhere ID     This is your Care EveryWhere ID. This could be used by other organizations to access your [facility] medical records  [id number]        Equal Access to Services     KASH FIGUEROA : Byron Loco, hoa lopez, qawillis valdez. So  [facility] [phone].    ATENCIÓN: Si habla español, tiene a chiu disposición servicios gratuitos de asistencia lingüística. Llame al [phone].    We comply with applicable federal civil rights laws and [place] laws. We do not discriminate on the basis of race, color, national origin, age, disability, sex, sexual orientation, or gender identity.            After Visit Summary       This is your record. Keep this with you and show to your community pharmacist(s) and doctor(s) at your next visit.

## 2018-07-23 ENCOUNTER — APPOINTMENT (OUTPATIENT)
Dept: CT IMAGING | Facility: CLINIC | Age: 83
End: 2018-07-23
Attending: EMERGENCY MEDICINE
Payer: MEDICARE

## 2018-07-23 VITALS
DIASTOLIC BLOOD PRESSURE: 95 MMHG | HEIGHT: 65 IN | BODY MASS INDEX: 17.49 KG/M2 | WEIGHT: 105 LBS | RESPIRATION RATE: 14 BRPM | TEMPERATURE: 97.7 F | SYSTOLIC BLOOD PRESSURE: 177 MMHG | OXYGEN SATURATION: 93 %

## 2018-07-23 PROCEDURE — 70450 CT HEAD/BRAIN W/O DYE: CPT

## 2018-07-23 NOTE — DISCHARGE INSTRUCTIONS
Discharge Instructions  Head Injury    You have been seen today for a head injury. Your evaluation included a history and physical examination. You may have had a CT (CAT) scan performed, though most head injuries do not require a scan. Based on this evaluation, your provider today does not feel that your head injury is serious.    Generally, every Emergency Department visit should have a follow-up clinic visit with either a primary or a specialty clinic/provider. Please follow-up as instructed by your emergency provider today.  Return to the Emergency Department if:    You are confused or you are not acting right.    Your headache gets worse or you start to have a really bad headache even with your recommended treatment plan.    You vomit (throw up) more than once.    You have a seizure.    You have trouble walking.    You have weakness or paralysis (cannot move) in an arm or a leg.    You have blood or fluid coming from your ears or nose.    You have new symptoms or anything that worries you.    Sleeping:  It is okay for you to sleep, but someone should wake you up if instructed by your provider, and someone should check on you at your usual time to wake up.     Activity:    Do not drive for at least 24 hours.    Do not drive if you have dizzy spells or trouble concentrating, or remembering things.    Do not return to any contact sports until cleared by your regular provider.     MORE INFORMATION:    Concussion:  A concussion is a minor head injury that may cause temporary problems with the way the brain works. Although concussions are important, they are generally not an emergency or a reason that a person needs to be hospitalized. Some concussion symptoms include confusion, amnesia (forgetful), nausea (sick to your stomach) and vomiting (throwing up), dizziness, fatigue, memory or concentration problems, irritability and sleep problems. For most people, concussions are mild and temporary but some will have more  severe and persistent symptoms that require on-going care and treatment.  CT Scans: Your evaluation today may have included a CT scan (CAT scan) to look for things like bleeding or a skull fracture (broken bone).  CT scans involve radiation and too many CT scans can cause serious health problems like cancer, especially in children.  Because of this, your provider may not have ordered a CT scan today if they think you are at low risk for a serious or life threatening problem.    If you were given a prescription for medicine here today, be sure to read all of the information (including the package insert) that comes with your prescription.  This will include important information about the medicine, its side effects, and any warnings that you need to know about.  The pharmacist who fills the prescription can provide more information and answer questions you may have about the medicine.  If you have questions or concerns that the pharmacist cannot address, please call or return to the Emergency Department.     Remember that you can always come back to the Emergency Department if you are not able to see your regular provider in the amount of time listed above, if you get any new symptoms, or if there is anything that worries you.      Lower Extremity Contusion  You have a contusion (bruise) of a lower extremity (leg, knee, ankle, foot, or toe). Symptoms include pain, swelling, and skin discoloration. No bones are broken. This injury may take from a few days to a few weeks to heal.  During that time, the bruise may change from reddish in color, to purple-blue, to green-yellow, to yellow-brown.  Home care    Unless another medicine was prescribed, you can take acetaminophen, ibuprofen, or naproxen to control pain. (If you have chronic liver or kidney disease or ever had a stomach ulcer or gastrointestinal bleeding, talk with your doctor before using these medicines.)    Elevate the injured area to reduce pain and  swelling. As much as possible, sit or lie down with the injured area raised about the level of your heart. This is especially important during the first 48 hours.    Ice the injured area to help reduce pain and swelling. Wrap a cold source (ice pack or ice cubes in a plastic bag) in a thin towel. Apply to the bruised area for 20 minutes every 1 to 2 hours the first day. Continue this 3 to 4 times a day until the pain and swelling goes away.    If crutches have been advised, do not bear full weight on the injured leg until you can do so without pain. You may return to sports when you are able to put full weight and impact on the injured leg without pain.  Follow up  Follow up with your healthcare provider or our staff as advised. Call if you are not improving within the next 1 to 2 weeks.  When to seek medical advice   Call your healthcare provider right away if any of these occur:    Increased pain or swelling    Foot or toes become cold, blue, numb or tingly    Signs of infection: Warmth, drainage, or increased redness or pain around the injury    Inability to move the injured area     Frequent bruising for unknown reasons  Date Last Reviewed: 2/1/2017 2000-2017 [employer]. [address] [place] [address], [place] [address]. All rights reserved. This information is not intended as a substitute for professional medical care. Always follow your healthcare professional's instructions.

## 2018-07-23 NOTE — ED NOTES
Assumed care at this time. Report received from Kinza SCHUSTER.  Pt to radiology at this time.  B/P: 197/88, T: 97.7, P: 62, R: 14  Pamela Smith RN,.......................................... 7/22/2018   11:18 PM

## 2018-07-23 NOTE — ED PROVIDER NOTES
"  History     Chief Complaint:  Fall    HPI   Kalli Brown is a 89 year old female with a history of HTN, hip arthroplasty, dislocated shoulder who presents to the emergency department for evaluation of a fall. The patient reports she sustained an unwitnessed fall today out of her wheelchair at [facility], where she lives, just prior to arrival. She states she was reaching for something out of the ground when she fell out of her wheelchair, resulting in striking her right anterior head against the wall. The patient denies any LOC in the episode but indicates she does have pain where her head struck the wall. She does report hip pain, which she attributes to crawling on the ground to reach the call button in her room. 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    Allergies:  Atorvastatin     Medications:    Tylenol  Norvasc  Aspirin  Citracal  Tegretol  Colace  Iron  Lasix  Avalide  Xalatn  Remeron  Miralax  Zoloft     Past Medical History:    Anemia  Deafness  Glaucoma  HTN  Trigeminal neuralgia  Anxiety    Past Surgical History:    Eye surgery  GYN surgery  Orthopedic surgery  Phacoemulsification clear cornea with standard intraocular lens implant    Family History:    No past pertinent family history.    Social History:  Presents with son.  Never smoker.  Negative for alcohol use.  Marital Status:   [5]     Review of Systems   Musculoskeletal: Positive for arthralgias (hips). Negative for back pain and neck pain.   Neurological: Positive for headaches. Negative for syncope.   All other systems reviewed and are negative.    Physical Exam     Patient Vitals for the past 24 hrs:   BP Temp Temp src Heart Rate Resp SpO2 Height Weight   07/23/18 0025 - - - - - 99 % - -   07/22/18 2300 - - - - - 93 % - -   07/22/18 2245 - - - - - 99 % - -   07/22/18 2238 197/88 97.7  F (36.5  C) Oral 61 14 94 % 1.651 m (5' 5\") 47.6 kg (105 lb) "       Physical Exam  General: Alert, interactive in mild distress  Head:  Scalp is atraumatic  Eyes:  The pupils are equal, round, and reactive to light    EOM's intact    No scleral icterus  ENT:      Nose:  The external nose is normal  Ears:  External ears are normal  Mouth/Throat: The oropharynx is normal    Mucus membranes are moist       Neck:  Normal range of motion.      There is no rigidity.    Trachea is in the midline         CV:  Regular rate and rhythm    No murmur   Resp:  Breath sounds are clear bilaterally    Non-labored, no retractions or accessory muscle use      GI:  Abdomen is soft, no distension, no tenderness.       MS:  Normal strength in all 4 extremities, No midline cervical, thoracic, or lumbar tenderness    Chronic deformity of the shoulders noted.  Skin:  Warm and dry, No rash or lesions noted.  Neuro: Strength 5/5 x4.  Sensation intact  In all 4 extremities.      GCS: 15  Psych:  Awake. Alert.  Normal affect.      Appropriate interactions.    Emergency Department Course     Imaging:  Radiographic findings were communicated with the patient and son who voiced understanding of the findings.    XR Pelvis w Hip Right 1 View:  1. 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 No evidence of hardware  failure or malalignment.  2. No visualized acute fracture or malalignment of the pelvis or right  hip. As per radiology.    CT Head without contrast:   No evidence of acute intracranial trauma. As per radiology.    Emergency Department Course:  Nursing notes and vitals reviewed. 2300 I performed an exam of the patient as documented above.     The patient was sent for a XR Pelvis, CT Head while in the emergency department, findings above.     0037 I rechecked the patient and discussed the results of her workup thus far.     Findings and plan explained to the Patient. Patient discharged home with instructions regarding supportive care, medications, and reasons to return. The importance of close follow-up was  reviewed.     Impression & Plan      Medical Decision Making:  Kalli Brown is a 89 year old female was seen and evaluated. The above workup was undertaken, demonstrating no acute findings. She suffered a mechanical fall. I do not believe she needs any laboratory work up. She is at her baseline mental status and has no significant complaints. The pain is under control and she feels comfortable discharged to home, as does her son. She will follow up with her PCP, return if new symptoms develop    Diagnosis:    ICD-10-CM   1. Closed head injury, initial encounter S09.90XA   2. Contusion of right hip, initial encounter S70.01XA   Mechanical fall    Disposition:  discharged to home as noted above    I, Twin Márquez, am serving as a scribe on 7/22/2018 at 11:00 PM to personally document services performed by Scott Gasca,* based on my observations and the provider's statements to me.     Twin Márquez  7/22/2018    EMERGENCY DEPARTMENT       Scott Gasca MD  07/23/18 0108

## 2018-07-26 ENCOUNTER — DOCUMENTATION ONLY (OUTPATIENT)
Dept: OTHER | Facility: CLINIC | Age: 83
End: 2018-07-26

## 2018-08-02 DIAGNOSIS — I10 BENIGN ESSENTIAL HYPERTENSION: ICD-10-CM

## 2018-08-02 DIAGNOSIS — D50.9 IRON DEFICIENCY ANEMIA, UNSPECIFIED IRON DEFICIENCY ANEMIA TYPE: ICD-10-CM

## 2018-08-02 NOTE — TELEPHONE ENCOUNTER
"Requested Prescriptions   Pending Prescriptions Disp Refills     amLODIPine (NORVASC) 10 MG tablet 30 tablet 11     Sig: Take 1 tablet (10 mg) by mouth daily    Calcium Channel Blockers Protocol  Failed    8/2/2018 10:10 AM       Failed - Blood pressure under 140/90 in past 12 months    BP Readings from Last 3 Encounters:   07/23/18 (!) 177/95   07/02/18 171/77   05/19/18 148/77                Passed - Recent (12 mo) or future (30 days) visit within the authorizing provider's specialty    Patient had office visit in the last 12 months or has a visit in the next 30 days with authorizing provider or within the authorizing provider's specialty.  See \"Patient Info\" tab in inbasket, or \"Choose Columns\" in Meds & Orders section of the refill encounter.           Passed - Patient is age 18 or older       Passed - No active pregnancy on record       Passed - Normal serum creatinine on file in past 12 months    Recent Labs   Lab Test  07/02/18   1227   CR  1.02            Passed - No positive pregnancy test in past 12 months     Last Written Prescription Date:  7/06/17  Last Fill Quantity: 30 tablet,  # refills: 11   Last office visit: 7/2/2018 with prescribing provider:  Wilman   Future Office Visit:           aspirin (ASPIRIN LOW DOSE) 81 MG chewable tablet 30 tablet 11    Analgesics (Non-Narcotic Tylenol and ASA Only) Passed    8/2/2018 10:10 AM       Passed - Recent (12 mo) or future (30 days) visit within the authorizing provider's specialty    Patient had office visit in the last 12 months or has a visit in the next 30 days with authorizing provider or within the authorizing provider's specialty.  See \"Patient Info\" tab in inbasket, or \"Choose Columns\" in Meds & Orders section of the refill encounter.           Passed - Patient is age 20 years or older    If ASA is flagged for ages under 20 years old. Forward to provider for confirmation Ryes Syndrome is not a concern.           Last Written Prescription Date:  " "7/31/17  Last Fill Quantity: 30 tablet,  # refills: 11   Last office visit: 7/2/2018 with prescribing provider:  Idelkope   Future Office Visit:           ferrous sulfate (IRON) 325 (65 Fe) MG tablet 60 tablet 11     Sig: Take 1 tablet (325 mg) by mouth 2 times daily (with meals)    Iron Supplements Passed    8/2/2018 10:10 AM       Passed - Patient is 12 years of age or older       Passed - Recent (12 mo) or future (30 days) visit within the authorizing provider's specialty    Patient had office visit in the last 12 months or has a visit in the next 30 days with authorizing provider or within the authorizing provider's specialty.  See \"Patient Info\" tab in inbasket, or \"Choose Columns\" in Meds & Orders section of the refill encounter.           Passed - Hgb OR Hct on record within the past 12 mos.    Patient need only have had a HGB or HCT on file in the past 12 mos. That result does not need to be normal.    Recent Labs   Lab Test  07/02/18   1227  07/05/17   1037  02/25/16   0652   HGB  9.7*  9.6*  10.6*     Recent Labs   Lab Test  07/02/18   1227  07/05/17   1037  02/25/16   0652   HCT  22.0*  29.1*  30.7*       Please verify a HGB or HCT has been checked SINCE THE LAST DOSE CHANGE.         Last Written Prescription Date:  7/06/17  Last Fill Quantity: 60 tablet,  # refills: 11   Last office visit: 7/2/2018 with prescribing provider:  Idelkope   Future Office Visit:           "

## 2018-08-03 RX ORDER — AMLODIPINE BESYLATE 10 MG/1
10 TABLET ORAL DAILY
Qty: 30 TABLET | Refills: 11 | Status: SHIPPED | OUTPATIENT
Start: 2018-08-03 | End: 2019-01-01

## 2018-08-03 RX ORDER — FERROUS SULFATE 325(65) MG
325 TABLET ORAL 2 TIMES DAILY WITH MEALS
Qty: 60 TABLET | Refills: 11 | Status: SHIPPED | OUTPATIENT
Start: 2018-08-03 | End: 2019-01-01

## 2018-08-03 RX ORDER — ASPIRIN 81 MG/1
TABLET, CHEWABLE ORAL
Qty: 30 TABLET | Refills: 11 | Status: SHIPPED | OUTPATIENT
Start: 2018-08-03 | End: 2019-01-01

## 2018-08-03 NOTE — TELEPHONE ENCOUNTER
Routing refill request to provider for review/approval because:  Labs out of range:  Hgb and hematocrit and BPs.  Please authorize if appropriate.  Thanks,  Shy Caputo RN

## 2018-08-20 RX ORDER — LATANOPROST 50 UG/ML
1 SOLUTION/ DROPS OPHTHALMIC AT BEDTIME
Status: CANCELLED | OUTPATIENT
Start: 2018-08-20

## 2018-08-20 NOTE — TELEPHONE ENCOUNTER
Do we fill or does eye Dr. fill this?      latanoprost (XALATAN) 0.005 % ophthalmic solution     --   Sig - Route: Place 1 drop into both eyes At Bedtime - Both Eyes     Last Written Prescription Date:  HISTORICAL entry  2/2015   Last Fill Quantity: --,  # refills: --   Last office visit: 7/2/2018 with prescribing provider:     Future Office Visit:           Routing refill request to provider for review/approval because:  Medication is reported/historical

## 2018-08-21 NOTE — TELEPHONE ENCOUNTER
Dr. Stovall, I am looking at the refill history, and it appears that you have been signing the refills for this for some time now.

## 2018-08-26 ENCOUNTER — MEDICAL CORRESPONDENCE (OUTPATIENT)
Dept: HEALTH INFORMATION MANAGEMENT | Facility: CLINIC | Age: 83
End: 2018-08-26

## 2018-08-27 ENCOUNTER — TELEPHONE (OUTPATIENT)
Dept: FAMILY MEDICINE | Facility: CLINIC | Age: 83
End: 2018-08-27

## 2018-08-27 DIAGNOSIS — N18.30 CKD (CHRONIC KIDNEY DISEASE) STAGE 3, GFR 30-59 ML/MIN (H): Primary | ICD-10-CM

## 2018-08-27 RX ORDER — IRBESARTAN AND HYDROCHLOROTHIAZIDE 300; 12.5 MG/1; MG/1
1 TABLET, FILM COATED ORAL DAILY
Qty: 30 TABLET | Refills: 11 | Status: SHIPPED | OUTPATIENT
Start: 2018-08-27 | End: 2019-01-01

## 2018-08-27 NOTE — TELEPHONE ENCOUNTER
Reason for Call:  Medication or medication refill:    Do you use a [facility]?  Name of the pharmacy and phone number for the current request:       [facility], [address]      Name of the medication requested: Latanoprost     Other request: Needs ASAP she is out    Can we leave a detailed message on this number? NO        Call taken on 8/27/2018 at 11:28 AM by Danny Paniagua

## 2018-08-27 NOTE — TELEPHONE ENCOUNTER
EPIC warning of contra-indication!  Please review    Medication is historical in chart    Pending Prescriptions:                       Disp   Refills    irbesartan-hydrochlorothiazide (AVALIDE) *30 tab*11           Sig: Take 1 tablet by mouth daily          Last Written Prescription Date:  historical  Last Fill Quantity: -,   # refills: -  Last Office Visit: 7-2-18 Annabelnicol  Future Office visit:       Routing refill request to provider for review/approval because:  Medication is reported/historical    RT Alex (R)

## 2018-08-27 NOTE — TELEPHONE ENCOUNTER
Per 8-20-18 Refill    Wilman, Man Wilson MD    8/20/18 5:35 PM   Note      xalatan is ordered by her ophthalmologist     Man Stovall MD

## 2018-09-05 DIAGNOSIS — E55.9 VITAMIN D DEFICIENCY: Primary | ICD-10-CM

## 2018-09-05 NOTE — TELEPHONE ENCOUNTER
Pending Prescriptions:                       Disp   Refills    Vitamin D, Cholecalciferol, 1000 units TA*30 tab*11           Sig: Take 1,000 Units by mouth daily          Last Written Prescription Date:  historical  Last Fill Quantity: -,   # refills: -  [facility]  Future Office visit:       Routing refill request to provider for review/approval because:  Medication is reported/historical    Pao Qureshi RT (R)

## 2018-09-07 RX ORDER — MULTIVIT-MIN/IRON/FOLIC ACID/K 18-600-40
1000 CAPSULE ORAL DAILY
Qty: 30 TABLET | Refills: 11 | Status: SHIPPED | OUTPATIENT
Start: 2018-09-07 | End: 2019-01-01

## 2018-09-08 ENCOUNTER — APPOINTMENT (OUTPATIENT)
Dept: GENERAL RADIOLOGY | Facility: CLINIC | Age: 83
End: 2018-09-08
Attending: EMERGENCY MEDICINE
Payer: MEDICARE

## 2018-09-08 ENCOUNTER — HOSPITAL ENCOUNTER (EMERGENCY)
Facility: CLINIC | Age: 83
Discharge: HOME | End: 2018-09-08
Attending: EMERGENCY MEDICINE | Admitting: EMERGENCY MEDICINE
Payer: MEDICARE

## 2018-09-08 ENCOUNTER — MEDICAL CORRESPONDENCE (OUTPATIENT)
Dept: HEALTH INFORMATION MANAGEMENT | Facility: CLINIC | Age: 83
End: 2018-09-08

## 2018-09-08 VITALS
TEMPERATURE: 98.3 F | DIASTOLIC BLOOD PRESSURE: 68 MMHG | SYSTOLIC BLOOD PRESSURE: 152 MMHG | RESPIRATION RATE: 24 BRPM | OXYGEN SATURATION: 95 %

## 2018-09-08 DIAGNOSIS — S20.211A CONTUSION OF RIGHT CHEST WALL, INITIAL ENCOUNTER: ICD-10-CM

## 2018-09-08 DIAGNOSIS — R91.8 PULMONARY INFILTRATE IN RIGHT LUNG ON CXR: ICD-10-CM

## 2018-09-08 PROCEDURE — 71046 X-RAY EXAM CHEST 2 VIEWS: CPT

## 2018-09-08 PROCEDURE — 99283 EMERGENCY DEPT VISIT LOW MDM: CPT | Mod: 25

## 2018-09-08 RX ORDER — AZITHROMYCIN 250 MG/1
TABLET, FILM COATED ORAL
Qty: 6 TABLET | Refills: 0 | Status: SHIPPED | OUTPATIENT
Start: 2018-09-08 | End: 2018-11-18

## 2018-09-08 ASSESSMENT — ENCOUNTER SYMPTOMS: ABDOMINAL PAIN: 0

## 2018-09-08 NOTE — ED AVS SNAPSHOT
Emergency Department    [address] [place] [address]    Phone:  [phone]    Fax:  [phone]                                       Kalli Brown   MRN: [id number]    Department:   Emergency Department   Date of Visit:  9/8/2018           After Visit Summary Signature Page     I have received my discharge instructions, and my questions have been answered. I have discussed any challenges I see with this plan with the nurse or doctor.    ..........................................................................................................................................  Patient/Patient Representative Signature      ..........................................................................................................................................  Patient Representative Print Name and Relationship to Patient    ..................................................               ................................................  Date                                            Time    ..........................................................................................................................................  Reviewed by Signature/Title    ...................................................              ..............................................  Date                                                            Time          22EPIC Rev 08/18

## 2018-09-08 NOTE — ED AVS SNAPSHOT
Emergency Department    [address] [facility] [address]    Phone:  [phone]    Fax:  [phone]                                       Kalli Brown   MRN: [id number]    Department:   Emergency Department   Date of Visit:  9/8/2018           Patient Information     Date Of Birth          2/26/1929        Your diagnoses for this visit were:     Contusion of right chest wall, initial encounter        You were seen by Pedro Luis Adkins MD.      Follow-up Information     Schedule an appointment as soon as possible for a visit with Man Stovall MD.    Specialty:  Internal Medicine    Why:  As needed;   use cold and tylenol as needed    Contact information:    [address] [address]  [phone]          Discharge Instructions         Chest Contusion    A contusion is a bruise to the skin, muscle, or ribs. It may cause pain, tenderness, and swelling. It may turn the skin purple until it heals. Contusions take a few days to a few weeks to heal.  Home care  Follow these guidelines when caring for yourself at home:    Rest. Don t do any heavy lifting or strenuous activity. Don t do any activity that causes pain.    Put an ice pack on the injured area. Do this for 20 minutes every 1 to 2 hours the first day. You can make an ice pack by wrapping a plastic bag of ice cubes in a thin towel. Continue to use the ice pack 3 to 4 times a day for the next 2 days. Then use the ice pack as needed to ease pain and swelling.    After 1 to 2 days you may put a warm compress on the area. Do this for 10 minutes several times a day. A warm compress is a clean cloth that s damp with warm water.    Hold a pillow to the affected area when you cough. This will help ease pain.    You may use over-the-counter pain medicine such as acetaminophen or ibuprofen to control pain, unless another pain medicine was prescribed. If you have chronic liver or kidney disease, talk with  your healthcare provider before using these medicines. Also talk with your provider if you ve had a stomach ulcer or gastrointestinal bleeding.  Follow-up care  Follow up with your healthcare provider, or as advised.  When to seek medical advice  Call your healthcare provider right away if any of these occur:    New abdominal pain or abdominal pain that gets worse    Fever of 100.4 F (38 C) or higher, or as directed by your healthcare provider  Call 911  Call 911 if any of these occur:     Dizziness, weakness, or fainting    Shortness of breath, trouble breathing, or breathing fast    Chest pain gets worse when you breathe    Severe pain that comes on suddenly or lasts more than an hour  Date Last Reviewed: 5/1/2017 2000-2017 [employer]. [address] [place] [address], [place] [address]. All rights reserved. This information is not intended as a substitute for professional medical care. Always follow your healthcare professional's instructions.          24 Hour Appointment Hotline       To make an appointment at any [facility] call [phone] ([phone]). If you don't have a family doctor or clinic, we will help you find one. [place] clinics are conveniently located to serve the needs of you and your family.             Review of your medicines      Our records show that you are taking the medicines listed below. If these are incorrect, please call your family doctor or clinic.        Dose / Directions Last dose taken    acetaminophen 500 MG tablet   Commonly known as:  TYLENOL   Dose:  1000 mg   Quantity:  540 tablet        Take 2 tablets (1,000 mg) by mouth 3 times daily   Refills:  0        amLODIPine 10 MG tablet   Commonly known as:  NORVASC   Dose:  10 mg   Quantity:  30 tablet        Take 1 tablet (10 mg) by mouth daily   Refills:  11        aspirin 81 MG chewable tablet   Commonly known as:  ASPIRIN LOW DOSE   Quantity:  30 tablet        1 TAB BY MOUTH DAILY (DX:ANTICOAGULATION)    Refills:  11        calcium citrate-vitamin D 315-250 MG-UNIT Tabs per tablet   Commonly known as:  CITRACAL   Dose:  1 tablet   Quantity:  180 tablet        Take 1 tablet by mouth 2 times daily -Take with food   Refills:  3        carBAMazepine 200 MG tablet   Commonly known as:  TEGretol   Quantity:  75 tablet        Take 200 mg in the AM and 300 mg in the PM.   Refills:  11        docusate sodium 250 MG capsule   Commonly known as:  COLACE   Dose:  250 mg   Quantity:  90 capsule        Take 1 capsule (250 mg) by mouth daily as needed for constipation   Refills:  3        ferrous sulfate 325 (65 Fe) MG tablet   Commonly known as:  IRON   Dose:  325 mg   Quantity:  60 tablet        Take 1 tablet (325 mg) by mouth 2 times daily (with meals)   Refills:  11        furosemide 80 MG tablet   Commonly known as:  LASIX   Dose:  80 mg   Quantity:  90 tablet        Take 1 tablet (80 mg) by mouth daily   Refills:  3        HALLS COUGH DROPS 7 MG Lozg   Dose:  1 lozenge   Quantity:  84 lozenge   Generic drug:  menthol        Take 1 lozenge (7 mg) by mouth every hour as needed for cough   Refills:  0        irbesartan-hydrochlorothiazide 300-12.5 MG per tablet   Commonly known as:  AVALIDE   Dose:  1 tablet   Quantity:  30 tablet        Take 1 tablet by mouth daily   Refills:  11        latanoprost 0.005 % ophthalmic solution   Commonly known as:  XALATAN   Dose:  1 drop        Place 1 drop into both eyes At Bedtime   Refills:  0        mirtazapine 7.5 MG Tabs tablet   Commonly known as:  REMERON   Dose:  7.5 mg   Quantity:  90 tablet        Take 1 tablet (7.5 mg) by mouth At Bedtime   Refills:  3        polyethylene glycol powder   Commonly known as:  MIRALAX   Dose:  17 g   Quantity:  510 g        Take 17 g by mouth daily as needed   Refills:  3        sertraline 25 MG tablet   Commonly known as:  ZOLOFT   Dose:  25 mg   Quantity:  90 tablet        Take 1 tablet (25 mg) by mouth daily   Refills:  1        Vitamin D  (Cholecalciferol) 1000 units Tabs   Dose:  1000 Units   Quantity:  30 tablet        Take 1,000 Units by mouth daily   Refills:  11                Procedures and tests performed during your visit     Chest XR,  PA & LAT      Orders Needing Specimen Collection     None      Pending Results     Date and Time Order Name Status Description    9/8/2018 1009 Chest XR,  PA & LAT Preliminary             Pending Culture Results     No orders found from 9/6/2018 to 9/9/2018.            Pending Results Instructions     If you had any lab results that were not finalized at the time of your Discharge, you can call the ED Lab Result RN at [phone]. You will be contacted by this team for any positive Lab results or changes in treatment. The nurses are available 7 days a week from 10A to 6:30P.  You can leave a message 24 hours per day and they will return your call.        Test Results From Your Hospital Stay        9/8/2018 10:38 AM      Narrative     CHEST TWO VIEWS  9/8/2018 10:32 AM     HISTORY: Fall, pain right side.     COMPARISON: 10/23/2015.        Impression     IMPRESSION: Bones are osteopenic which limits evaluation for  fractures. 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  Patchy lower lobe airspace opacities, best seen on the lateral view  concerning for infiltrates. There are small pleural effusions. No  definite pneumothorax visualized. Cardiac silhouette within normal  limits. Severe erosive changes in the shoulders.                Clinical Quality Measure: Blood Pressure Screening     Your blood pressure was checked while you were in the emergency department today. The last reading we obtained was  BP: 187/80 . Please read the guidelines below about what these numbers mean and what you should do about them.  If your systolic blood pressure (the top number) is less than 120 and your diastolic blood pressure (the bottom number) is  less than 80, then your blood pressure is normal. There is nothing more that you need to do about it.  If your systolic blood pressure (the top number) is 120-139 or your diastolic blood pressure (the bottom number) is 80-89, your blood pressure may be higher than it should be. You should have your blood pressure rechecked within a year by a primary care provider.  If your systolic blood pressure (the top number) is 140 or greater or your diastolic blood pressure (the bottom number) is 90 or greater, you may have high blood pressure. High blood pressure is treatable, but if left untreated over time it can put you at risk for heart attack, stroke, or kidney failure. You should have your blood pressure rechecked by a primary care provider within the next 4 weeks.  If your provider in the emergency department today gave you specific instructions to follow-up with your doctor or provider even sooner than that, you should follow that instruction and not wait for up to 4 weeks for your follow-up visit.        Thank you for choosing [facility]       Thank you for choosing [facility] for your care. Our goal is always to provide you with excellent care. Hearing back from our patients is one way we can continue to improve our services. Please take a few minutes to complete the written survey that you may receive in the mail after you visit with us. Thank you!        Care EveryWhere ID     This is your Care EveryWhere ID. This could be used by other organizations to access your [facility] medical records  [id number]        Equal Access to Services     KASH FIGUEROA : Hadii juve Loco, waaxda luqadaha, qaybta kaalmada alondra, willis colbert. So [facility] [phone].    ATENCIÓN: Si habla español, tiene a chiu disposición servicios gratuitos de asistencia lingüística. Llame al [phone].    We comply with applicable federal civil rights laws and [place] laws. We do not discriminate on the  basis of race, color, national origin, age, disability, sex, sexual orientation, or gender identity.            After Visit Summary       This is your record. Keep this with you and show to your community pharmacist(s) and doctor(s) at your next visit.

## 2018-09-08 NOTE — DISCHARGE INSTRUCTIONS
Chest Contusion    A contusion is a bruise to the skin, muscle, or ribs. It may cause pain, tenderness, and swelling. It may turn the skin purple until it heals. Contusions take a few days to a few weeks to heal.  Home care  Follow these guidelines when caring for yourself at home:    Rest. Don t do any heavy lifting or strenuous activity. Don t do any activity that causes pain.    Put an ice pack on the injured area. Do this for 20 minutes every 1 to 2 hours the first day. You can make an ice pack by wrapping a plastic bag of ice cubes in a thin towel. Continue to use the ice pack 3 to 4 times a day for the next 2 days. Then use the ice pack as needed to ease pain and swelling.    After 1 to 2 days you may put a warm compress on the area. Do this for 10 minutes several times a day. A warm compress is a clean cloth that s damp with warm water.    Hold a pillow to the affected area when you cough. This will help ease pain.    You may use over-the-counter pain medicine such as acetaminophen or ibuprofen to control pain, unless another pain medicine was prescribed. If you have chronic liver or kidney disease, talk with your healthcare provider before using these medicines. Also talk with your provider if you ve had a stomach ulcer or gastrointestinal bleeding.  Follow-up care  Follow up with your healthcare provider, or as advised.  When to seek medical advice  Call your healthcare provider right away if any of these occur:    New abdominal pain or abdominal pain that gets worse    Fever of 100.4 F (38 C) or higher, or as directed by your healthcare provider  Call 911  Call 911 if any of these occur:     Dizziness, weakness, or fainting    Shortness of breath, trouble breathing, or breathing fast    Chest pain gets worse when you breathe    Severe pain that comes on suddenly or lasts more than an hour  Date Last Reviewed: 5/1/2017 2000-2017 [employer]. [address] [facility], [place] [address]. All  rights reserved. This information is not intended as a substitute for professional medical care. Always follow your healthcare professional's instructions.

## 2018-09-08 NOTE — ED PROVIDER NOTES
History     Chief Complaint:  Fall    HPI   Kalli Brown is a 89 year old female, in assisted living, who presents to the emergency department today for evaluation of fall. The patient states she fell during transfer from her bed into her wheelchair last night. She adds she was not with any caretaker at the time and yelled for help. The patient adds she may have bumped her neck but primarily presents to the ED for right sided rib pain. The patient denies difficulty breathing, abdominal pain, leg pain, or history of blood thinners.  Of note, the patient has chronic dislocated bilateral shoulders and non-ambulatory.     Allergies:  Atorvastatin     Medications:    Norvasc  Tegretol  Colace  Lasix  Avalide  Xalatan  Remeron    Past Medical History:    Anemia  Deafness  Dislocated shoulder  Edema  Glaucoma  Hypertension  Multiple myeloma  Proteinuria  Shoulder dislocation  Trigeminal neuralgia     Past Surgical History:    Eye surgery  Gyn surgery  Orthopedic surgery     Family History:    History reviewed. No pertinent family history.    Social History:  The patient was accompanied to the ED by family.  Smoking Status: Never Smoker  Smokeless Tobacco: Never Used  Alcohol Use: Negative   Marital Status:      Review of Systems   Respiratory:        No difficulty breathing      Gastrointestinal: Negative for abdominal pain.   Musculoskeletal:        Right sided rib pain  No leg pain    All other systems reviewed and are negative.    Physical Exam     Patient Vitals for the past 24 hrs:   BP Temp Temp src Heart Rate Resp SpO2   09/08/18 1002 187/80 98.3  F (36.8  C) Oral 65 24 95 %     Physical Exam  Constitutional: small elderly white female, supine  HENT: No signs of trauma.   Eyes: EOM are normal. Pupils are equal, round, and reactive to light.   Neck: Normal range of motion. No JVD present. No cervical adenopathy. No posterior midline tenderness neck  Cardiovascular: Regular rhythm.  Exam reveals no  gallop and no friction rub.    No murmur heard.  Pulmonary/Chest: Bilateral breath sounds normal. No wheezes, rhonchi or rales. No respiratory distress. Chest wall tenderness on right side, no burising swelling or creipitance  Abdominal: Soft. No rebound or guarding.   Musculoskeletal: No edema. No tenderness. Chronically dislocated bilateral shoulders, no tenderness over thoracic or lumbar spine.   Lymphadenopathy: No lymphadenopathy.   Neurological: Alert and oriented to person, place, and time. Normal strength. Coordination normal. GCS 15 fluent speech, no facial asymmetry, and normal sensation  Skin: Skin is warm and dry. No rash noted. No erythema.     Emergency Department Course     Imaging:  Radiology findings were communicated with the patient who voiced understanding of the findings.    Chest XR,  PA & LAT  Bones are osteopenic which limits evaluation for  fractures. 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  Patchy lower lobe airspace opacities, best seen on the lateral view  concerning for infiltrates. There are small pleural effusions. No  definite pneumothorax visualized. Cardiac silhouette within normal  limits. Severe erosive changes in the shoulders.  ZAHEER CASTELAN MD  Reading per radiology    Emergency Department Course:    1000 Nursing notes and vitals reviewed.    1002 I performed an exam of the patient as documented above.     1025 The patient was sent for a x-ray while in the emergency department, results above.     1055 I personally reviewed the imaging results with the patient and answered all related questions prior to discharge.    Impression & Plan      Medical Decision Making:  Kalli Brown is a 89 year old female who was transferring last night and slipped in her transfer and got caught against the bed, she complains of some soreness to the right side of her chest. She has nausea, vomiting,  breathing problems, she denies any head neck or back injury, she denies interior chest or abdominal pain. She has chronic dislocations of both shoulders and is non-ambulatory. On exam, there was some mild tenderness to the right side of the chest, no crepitance bruising or flail. Rest of her exam is non-focal and x ray of the chest reveals no obvious infiltrates like pneumothorax or refractures.  Radiologist sees a possible infiltrate rlL , so I will perscibe a zpak;  Patient did not want anything for pain;   I recommenced cold compresses, Tylenol or ibuprofen as needed. Follow up with PMD in the next week;  recheck in the ED if she has any breathing difficulties.     Diagnosis:    ICD-10-CM   1. Contusion of right chest wall, initial encounter S20.211A     Disposition:   The patient is discharged to home with a zpak script.    Scribe Disclosure:  Jeremi GREGORY, am serving as a scribe at 10:02 AM on 9/8/2018 to document services personally performed by Pedro Luis Adkins MD based on my observations and the provider's statements to me.     EMERGENCY DEPARTMENT       Pedro Luis Adkins MD  09/08/18 [id number]

## 2018-09-09 ENCOUNTER — MEDICAL CORRESPONDENCE (OUTPATIENT)
Dept: HEALTH INFORMATION MANAGEMENT | Facility: CLINIC | Age: 83
End: 2018-09-09

## 2018-09-22 ENCOUNTER — MEDICAL CORRESPONDENCE (OUTPATIENT)
Dept: HEALTH INFORMATION MANAGEMENT | Facility: CLINIC | Age: 83
End: 2018-09-22

## 2018-09-28 ENCOUNTER — TELEPHONE (OUTPATIENT)
Dept: FAMILY MEDICINE | Facility: CLINIC | Age: 83
End: 2018-09-28

## 2018-09-28 NOTE — TELEPHONE ENCOUNTER
Reason for Call:  Other     Detailed comments: Patient refused to be seen by PT today.  PT contacted the family to request that one of them be present at the next appt, (not set yet)         Best Time: anytime    Can we leave a detailed message on this number? YES    Call taken on 9/28/2018 at 3:19 PM by Viviana Kennedy

## 2018-10-02 ENCOUNTER — TELEPHONE (OUTPATIENT)
Dept: FAMILY MEDICINE | Facility: CLINIC | Age: 83
End: 2018-10-02

## 2018-10-02 NOTE — TELEPHONE ENCOUNTER
Reason for Call:  Home Health Care    Becky with [facility] called regarding (reason for call): PT refusal    Orders are needed for this patient. Home Care    PT: Pt refused    OT: na    Skilled Nursing: Per PT - pt refusing Home Care admission, family aware.  No identified member for healthcare decision making for patient other than patient.      Pt Provider: Wilman    Phone Number Homecare Nurse can be reached at: [phone]    Can we leave a detailed message on this number? YES    Phone number patient can be reached at: Other phone number:  na    Best Time: any    Call taken on 10/2/2018 at 8:42 AM by Lucy Sheppard

## 2018-10-03 ENCOUNTER — TELEPHONE (OUTPATIENT)
Dept: FAMILY MEDICINE | Facility: CLINIC | Age: 83
End: 2018-10-03

## 2018-10-03 ENCOUNTER — MEDICAL CORRESPONDENCE (OUTPATIENT)
Dept: HEALTH INFORMATION MANAGEMENT | Facility: CLINIC | Age: 83
End: 2018-10-03

## 2018-10-03 NOTE — TELEPHONE ENCOUNTER
Reason for Call:  Medication dosing discontinuace    Name of the medication :   Remeron (7.5 mg) vs Zoloft (25 mg)   Which one should be discontinued?    Other request:   Dr. Stovall had accepted the recommendation of the pharmacist discontinuing either remeron (7.5) or zoloft (25 mg).  [place] [facility] [place] and the Pharmacist need to know which one specifically needs to be discontinued?  Please advise.      Fax: [phone]  Phone: [phone]      Can we leave a detailed message on this number? YES    Best Time: Any     Call taken on 10/3/2018 at 2:14 PM by Rebeca José

## 2018-10-04 NOTE — TELEPHONE ENCOUNTER
I would recommend continuing both as they do have different mechanisms and are prescribed for different purposes if the patient and family are willing to continue both sertraline and mirtazapine.    Man Stovall MD

## 2018-10-04 NOTE — TELEPHONE ENCOUNTER
Called pharmacy   Both in the same therapeutic class and state this is double therapy.  Although, pharmacy states pt can continue both as they have different mechanisms of action and taken at different times.  Would you like to continue both or discontinue one?  Please advise.    Thank you,  Santos CARIAS RN

## 2018-10-04 NOTE — TELEPHONE ENCOUNTER
Can we ask why they are recommending discontinuing sertraline or mirtazapine?    Man Stovall MD

## 2018-10-04 NOTE — TELEPHONE ENCOUNTER
Spoke with MARIELY Wilson at [facility]. Provided message below in detail.     Agreed with plan.     Tammi BURROUGHS RN

## 2018-10-23 ENCOUNTER — MEDICAL CORRESPONDENCE (OUTPATIENT)
Dept: HEALTH INFORMATION MANAGEMENT | Facility: CLINIC | Age: 83
End: 2018-10-23

## 2018-10-26 ENCOUNTER — ALLIED HEALTH/NURSE VISIT (OUTPATIENT)
Dept: NURSING | Facility: CLINIC | Age: 83
End: 2018-10-26
Payer: COMMERCIAL

## 2018-10-26 DIAGNOSIS — Z23 NEED FOR PROPHYLACTIC VACCINATION AND INOCULATION AGAINST INFLUENZA: Primary | ICD-10-CM

## 2018-10-26 PROCEDURE — G0008 ADMIN INFLUENZA VIRUS VAC: HCPCS

## 2018-10-26 PROCEDURE — 90662 IIV NO PRSV INCREASED AG IM: CPT

## 2018-10-26 NOTE — MR AVS SNAPSHOT
After Visit Summary   10/26/2018    Kalli Brown    MRN: [id number]           Patient Information     Date Of Birth          2/26/1929        Visit Information        Provider Department      10/26/2018 10:00 AM [facility] [place] NURSE [facility]        Today's Diagnoses     Need for prophylactic vaccination and inoculation against influenza    -  1       Follow-ups after your visit        Who to contact     If you have questions or need follow up information about today's clinic visit or your schedule please contact [facility] directly at [phone].  Normal or non-critical lab and imaging results will be communicated to you by MyChart, letter or phone within 4 business days after the clinic has received the results. If you do not hear from us within 7 days, please contact the clinic through MyChart or phone. If you have a critical or abnormal lab result, we will notify you by phone as soon as possible.  Submit refill requests through [employer] or call your pharmacy and they will forward the refill request to us. Please allow 3 business days for your refill to be completed.          Additional Information About Your Visit        Care EveryWhere ID     This is your Care EveryWhere ID. This could be used by other organizations to access your [facility] medical records  [id number]         Blood Pressure from Last 3 Encounters:   09/08/18 152/68   07/23/18 (!) 177/95   07/02/18 171/77    Weight from Last 3 Encounters:   07/22/18 105 lb (47.6 kg)   01/29/18 100 lb (45.4 kg)   11/30/17 103 lb (46.7 kg)              We Performed the Following     FLU VACCINE, INCREASED ANTIGEN, PRESV FREE, AGE 65+ [92578]     Vaccine Administration, Initial [69899]        Primary Care Provider Office Phone # Fax #    Man Stovall [phone] [address]  [place] [address]        Equal Access to Services     KASH FIGUEROA AH: Byron Loco,  waaxda luqadaha, qaybta kaalmada alondra, willis rosenbaumaajules ah. So [facility] [phone].    ATENCIÓN: Si louise torres, tiene a chiu disposición servicios gratuitos de asistencia lingüística. Meng al [phone].    We comply with applicable federal civil rights laws and [place] laws. We do not discriminate on the basis of race, color, national origin, age, disability, sex, sexual orientation, or gender identity.            Thank you!     Thank you for choosing [facility]  for your care. Our goal is always to provide you with excellent care. Hearing back from our patients is one way we can continue to improve our services. Please take a few minutes to complete the written survey that you may receive in the mail after your visit with us. Thank you!             Your Updated Medication List - Protect others around you: Learn how to safely use, store and throw away your medicines at www.disposemymeds.org.          This list is accurate as of 10/26/18 10:07 AM.  Always use your most recent med list.                   Brand Name Dispense Instructions for use Diagnosis    acetaminophen 500 MG tablet    TYLENOL    540 tablet    Take 2 tablets (1,000 mg) by mouth 3 times daily    Chronic low back pain, unspecified back pain laterality, with sciatica presence unspecified       amLODIPine 10 MG tablet    NORVASC    30 tablet    Take 1 tablet (10 mg) by mouth daily    Benign essential hypertension       aspirin 81 MG chewable tablet    ASPIRIN LOW DOSE    30 tablet    1 TAB BY MOUTH DAILY (DX:ANTICOAGULATION)    Benign essential hypertension       azithromycin 250 MG tablet    ZITHROMAX    6 tablet    Two tablets first day, then one tablet daily for four days.        calcium citrate-vitamin D 315-250 MG-UNIT Tabs per tablet    CITRACAL    180 tablet    Take 1 tablet by mouth 2 times daily -Take with food    Osteoporosis, unspecified osteoporosis type, unspecified pathological fracture presence        carBAMazepine 200 MG tablet    TEGretol    75 tablet    Take 200 mg in the AM and 300 mg in the PM.    Trigeminal neuralgia       docusate sodium 250 MG capsule    COLACE    90 capsule    Take 1 capsule (250 mg) by mouth daily as needed for constipation    Constipation, unspecified constipation type       ferrous sulfate 325 (65 Fe) MG tablet    IRON    60 tablet    Take 1 tablet (325 mg) by mouth 2 times daily (with meals)    Iron deficiency anemia, unspecified iron deficiency anemia type       furosemide 80 MG tablet    LASIX    90 tablet    Take 1 tablet (80 mg) by mouth daily    Benign essential hypertension       HALLS COUGH DROPS 7 MG Lozg   Generic drug:  menthol     84 lozenge    Take 1 lozenge (7 mg) by mouth every hour as needed for cough        irbesartan-hydrochlorothiazide 300-12.5 MG per tablet    AVALIDE    30 tablet    Take 1 tablet by mouth daily    CKD (chronic kidney disease) stage 3, GFR 30-59 ml/min (H)       latanoprost 0.005 % ophthalmic solution    XALATAN     Place 1 drop into both eyes At Bedtime        mirtazapine 7.5 MG Tabs tablet    REMERON    90 tablet    Take 1 tablet (7.5 mg) by mouth At Bedtime    Primary insomnia       polyethylene glycol powder    MIRALAX    510 g    Take 17 g by mouth daily as needed    Constipation, unspecified constipation type       sertraline 25 MG tablet    ZOLOFT    90 tablet    Take 1 tablet (25 mg) by mouth daily    DAVID (generalized anxiety disorder)       Vitamin D (Cholecalciferol) 1000 units Tabs     30 tablet    Take 1,000 Units by mouth daily    Vitamin D deficiency

## 2018-10-26 NOTE — PROGRESS NOTES

## 2018-10-28 ENCOUNTER — APPOINTMENT (OUTPATIENT)
Dept: GENERAL RADIOLOGY | Facility: CLINIC | Age: 83
End: 2018-10-28
Attending: EMERGENCY MEDICINE
Payer: MEDICARE

## 2018-10-28 ENCOUNTER — HOSPITAL ENCOUNTER (EMERGENCY)
Facility: CLINIC | Age: 83
Discharge: HOME | End: 2018-10-28
Attending: EMERGENCY MEDICINE | Admitting: EMERGENCY MEDICINE
Payer: MEDICARE

## 2018-10-28 VITALS
RESPIRATION RATE: 20 BRPM | SYSTOLIC BLOOD PRESSURE: 150 MMHG | TEMPERATURE: 98 F | HEART RATE: 63 BPM | DIASTOLIC BLOOD PRESSURE: 59 MMHG | OXYGEN SATURATION: 96 %

## 2018-10-28 DIAGNOSIS — S22.000A CLOSED COMPRESSION FRACTURE OF THORACIC VERTEBRA, INITIAL ENCOUNTER (H): ICD-10-CM
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
## 2018-10-28 DIAGNOSIS — S32.010A CLOSED COMPRESSION FRACTURE OF FIRST LUMBAR VERTEBRA, INITIAL ENCOUNTER: ICD-10-CM

## 2018-10-28 PROCEDURE — 72080 X-RAY EXAM THORACOLMB 2/> VW: CPT

## 2018-10-28 PROCEDURE — A9270 NON-COVERED ITEM OR SERVICE: HCPCS | Mod: GY | Performed by: EMERGENCY MEDICINE

## 2018-10-28 PROCEDURE — 72170 X-RAY EXAM OF PELVIS: CPT

## 2018-10-28 PROCEDURE — 99284 EMERGENCY DEPT VISIT MOD MDM: CPT

## 2018-10-28 PROCEDURE — 25000132 ZZH RX MED GY IP 250 OP 250 PS 637: Performed by: EMERGENCY MEDICINE

## 2018-10-28 RX ORDER — TRAMADOL HYDROCHLORIDE 50 MG/1
50 TABLET ORAL ONCE
Status: COMPLETED | OUTPATIENT
Start: 2018-10-28 | End: 2018-10-28

## 2018-10-28 RX ORDER — ACETAMINOPHEN 500 MG
1000 TABLET ORAL ONCE
Status: COMPLETED | OUTPATIENT
Start: 2018-10-28 | End: 2018-10-28

## 2018-10-28 RX ADMIN — ACETAMINOPHEN 1000 MG: 500 TABLET, FILM COATED ORAL at 11:36

## 2018-10-28 RX ADMIN — TRAMADOL HYDROCHLORIDE 50 MG: 50 TABLET, COATED ORAL at 13:10

## 2018-10-28 ASSESSMENT — ENCOUNTER SYMPTOMS
NECK PAIN: 0
BACK PAIN: 1
ROS SKIN COMMENTS: POSITIVE FOR BRUISING.
ABDOMINAL PAIN: 0
SHORTNESS OF BREATH: 0

## 2018-10-28 NOTE — ED AVS SNAPSHOT
Emergency Department    [address] [facility] [place] [address]    Phone:  [phone]    Fax:  [phone]                                       Kalli Brown   MRN: [id number]    Department:   Emergency Department   Date of Visit:  10/28/2018           Patient Information     Date Of Birth          2/26/1929        Your diagnoses for this visit were:     Fall, initial encounter     Closed compression fracture of thoracic vertebra, initial encounter (H) T11    Closed compression fracture of first lumbar vertebra, initial encounter (H)        You were seen by Carmen Pack MD.      Follow-up Information     Schedule an appointment as soon as possible for a visit with Man Stovall MD.    Specialty:  Internal Medicine    Why:  As needed    Contact information:    [address] [address]  [phone]          Discharge Instructions       Tylenol and/or tramadol as needed for pain.  You must call for help whenever you are going to transfer either out of bed or to the bathroom.  If they are unable to help you in an appropriate amount of time, you may need to increase your nursing care level.  Follow-up in the clinic if your pain is not adequately controlled with the tramadol        Back Fracture (Compression Fracture)  Your spine stretches from the base of your skull to your tailbone. It's composed of 33 bones (vertebrae) stacked on top of one another. These bones are strong enough to support the weight of your upper body. Certain injuries, however, can damage one or more of the vertebrae and cause them to collapse. A collapsed bone in your spine is known as a compression fracture.    Causes of compression fracture  Many compression fractures result from osteoporosis. This disease thins your bones so they can't withstand normal pressure. Trauma from a car accident or hard fall can fracture even healthy vertebrae. In rare cases, vertebrae may fracture for  unknown reasons.  When to go to the emergency room (ER)  Call 911 if you've been in an accident or had a fall and have neck or back pain, especially when pain occurs with any of these symptoms:    Loss of control over your bowels or bladder    Numbness or weakness    High fever    Unexplained back pain in a person with cancer  What to expect in the ER  A healthcare provider will ask about your health history and examine you. In some cases, you may have X-rays. You also may have other tests, such as a CT scan or MRI. These tests can provide detailed images of your bones and spinal cord.  Treatment  Treatment will depend on the type and cause of the fracture. You will be given medicine for pain. Severe fractures or those that cause nerve problems may need surgery. Many compression fractures mend on their own.  Follow-up  As you improve, you may be given exercises to strengthen your bones. If you have osteoporosis, your healthcare provider may prescribe a medicine to treat it. Sometimes you may have pain even after the bone has healed. In that case, your healthcare provider will discuss your  Date Last Reviewed: 9/30/2015 2000-2017 [employer]. [address] [place] [address]. All rights reserved. This information is not intended as a substitute for professional medical care. Always follow your healthcare professional's instructions.          24 Hour Appointment Hotline       To make an appointment at any [facility], call [phone] ([phone]). If you don't have a family doctor or clinic, we will help you find one. [facility] are conveniently located to serve the needs of you and your family.             Review of your medicines      Our records show that you are taking the medicines listed below. If these are incorrect, please call your family doctor or clinic.        Dose / Directions Last dose taken    acetaminophen 500 MG tablet   Commonly known as:  TYLENOL   Dose:   1000 mg   Quantity:  540 tablet        Take 2 tablets (1,000 mg) by mouth 3 times daily   Refills:  0        amLODIPine 10 MG tablet   Commonly known as:  NORVASC   Dose:  10 mg   Quantity:  30 tablet        Take 1 tablet (10 mg) by mouth daily   Refills:  11        aspirin 81 MG chewable tablet   Commonly known as:  ASPIRIN LOW DOSE   Quantity:  30 tablet        1 TAB BY MOUTH DAILY (DX:ANTICOAGULATION)   Refills:  11        azithromycin 250 MG tablet   Commonly known as:  ZITHROMAX   Quantity:  6 tablet        Two tablets first day, then one tablet daily for four days.   Refills:  0        calcium citrate-vitamin D 315-250 MG-UNIT Tabs per tablet   Commonly known as:  CITRACAL   Dose:  1 tablet   Quantity:  180 tablet        Take 1 tablet by mouth 2 times daily -Take with food   Refills:  3        carBAMazepine 200 MG tablet   Commonly known as:  TEGretol   Quantity:  75 tablet        Take 200 mg in the AM and 300 mg in the PM.   Refills:  11        docusate sodium 250 MG capsule   Commonly known as:  COLACE   Dose:  250 mg   Quantity:  90 capsule        Take 1 capsule (250 mg) by mouth daily as needed for constipation   Refills:  3        ferrous sulfate 325 (65 Fe) MG tablet   Commonly known as:  IRON   Dose:  325 mg   Quantity:  60 tablet        Take 1 tablet (325 mg) by mouth 2 times daily (with meals)   Refills:  11        furosemide 80 MG tablet   Commonly known as:  LASIX   Dose:  80 mg   Quantity:  90 tablet        Take 1 tablet (80 mg) by mouth daily   Refills:  3        HALLS COUGH DROPS 7 MG Lozg   Dose:  1 lozenge   Quantity:  84 lozenge   Generic drug:  menthol        Take 1 lozenge (7 mg) by mouth every hour as needed for cough   Refills:  0        irbesartan-hydrochlorothiazide 300-12.5 MG per tablet   Commonly known as:  AVALIDE   Dose:  1 tablet   Quantity:  30 tablet        Take 1 tablet by mouth daily   Refills:  11        latanoprost 0.005 % ophthalmic solution   Commonly known as:  XALATAN    Dose:  1 drop        Place 1 drop into both eyes At Bedtime   Refills:  0        mirtazapine 7.5 MG Tabs tablet   Commonly known as:  REMERON   Dose:  7.5 mg   Quantity:  90 tablet        Take 1 tablet (7.5 mg) by mouth At Bedtime   Refills:  3        polyethylene glycol powder   Commonly known as:  MIRALAX   Dose:  17 g   Quantity:  510 g        Take 17 g by mouth daily as needed   Refills:  3        sertraline 25 MG tablet   Commonly known as:  ZOLOFT   Dose:  25 mg   Quantity:  90 tablet        Take 1 tablet (25 mg) by mouth daily   Refills:  1        Vitamin D (Cholecalciferol) 1000 units Tabs   Dose:  1000 Units   Quantity:  30 tablet        Take 1,000 Units by mouth daily   Refills:  11                Procedures and tests performed during your visit     XR Pelvis 1/2 Views    XR Thoracic Lumbar Spine 2 Views      Orders Needing Specimen Collection     None      Pending Results     No orders found from 10/26/2018 to 10/29/2018.            Pending Culture Results     No orders found from 10/26/2018 to 10/29/2018.            Pending Results Instructions     If you had any lab results that were not finalized at the time of your Discharge, you can call the ED Lab Result RN at [phone]. You will be contacted by this team for any positive Lab results or changes in treatment. The nurses are available 7 days a week from 10A to 6:30P.  You can leave a message 24 hours per day and they will return your call.        Test Results From Your Hospital Stay        10/28/2018 11:53 AM      Narrative     XR PELVIS 1/2 VW 10/28/2018 11:50 AM    HISTORY: Pain.    COMPARISON: July 22, 2018.         Impression     IMPRESSION: Status post right total hip arthroplasty. Hardware is  intact. Alignment is anatomic.     SAV ALEX MD         10/28/2018 11:54 AM      Narrative     XR THORACIC LUMBAR SPINE 2 VW 10/28/2018 11:51 AM    HISTORY: Pain.    COMPARISON: None.        Impression     IMPRESSION: There is anterior wedging of  the T11 and L1 vertebra with  approximately 50% height loss in each, suggestive of age-indeterminate  compression fractures. Grade 1 retrolisthesis of L1 on L2. Moderate to  severe degenerative changes throughout the visualized spine. There is  atherosclerotic calcification of the abdominal aorta.     SAV ALEX MD                Clinical Quality Measure: Blood Pressure Screening     Your blood pressure was checked while you were in the emergency department today. The last reading we obtained was  BP: 150/59 . Please read the guidelines below about what these numbers mean and what you should do about them.  If your systolic blood pressure (the top number) is less than 120 and your diastolic blood pressure (the bottom number) is less than 80, then your blood pressure is normal. There is nothing more that you need to do about it.  If your systolic blood pressure (the top number) is 120-139 or your diastolic blood pressure (the bottom number) is 80-89, your blood pressure may be higher than it should be. You should have your blood pressure rechecked within a year by a primary care provider.  If your systolic blood pressure (the top number) is 140 or greater or your diastolic blood pressure (the bottom number) is 90 or greater, you may have high blood pressure. High blood pressure is treatable, but if left untreated over time it can put you at risk for heart attack, stroke, or kidney failure. You should have your blood pressure rechecked by a primary care provider within the next 4 weeks.  If your provider in the emergency department today gave you specific instructions to follow-up with your doctor or provider even sooner than that, you should follow that instruction and not wait for up to 4 weeks for your follow-up visit.        Thank you for choosing [facility]       Thank you for choosing [facility] for your care. Our goal is always to provide you with excellent care. Hearing back from our patients is one way we can  continue to improve our services. Please take a few minutes to complete the written survey that you may receive in the mail after you visit with us. Thank you!        Care EveryWhere ID     This is your Care EveryWhere ID. This could be used by other organizations to access your [facility] medical records  [id number]        Equal Access to Services     KASH FIGUEROA : Byron Loco, hoa lopez, willis leblanc. So [facility] [phone].    ATENCIÓN: Si habla español, tiene a chiu disposición servicios gratuitos de asistencia lingüística. Meng al [phone].    We comply with applicable federal civil rights laws and [place] laws. We do not discriminate on the basis of race, color, national origin, age, disability, sex, sexual orientation, or gender identity.            After Visit Summary       This is your record. Keep this with you and show to your community pharmacist(s) and doctor(s) at your next visit.

## 2018-10-28 NOTE — ED PROVIDER NOTES
History     Chief Complaint:  Fall, back pain    HPI   Kalli Brown is a 89 year old female who presents for evaluation of back pain after a fall.  The patient reports falling in her bathroom, landing directly on her buttocks approximately 1 hour prior to presentation.  She denies hitting her head.  She has pain in her low back that his new since the fall.  She has no pain in her upper back, pelvis, or legs.  Her son notes she has had 6 falls in the last month and a half, always when transferring to her wheelchair and the bathroom.  She lives in assisted living and getting staff to help her to the bathroom is sometimes a hassle therefore per her son, she does not attempt to contact them.    Allergies:  Atorvastatin      Medications:    Amlodipine  Irbesartan-Hydrochlorothiazide   Furosemide   Tegretol   Mirtazapine   Sertraline   Colace   Polyethylene glycol   Iron   Aspirin   Vitamin D  Calcium with Vitamin D  Tylenol   Latanoprost ophthalmic solution     Past Medical History:    Failure to thrive  Hypertension  Multiple myeloma   Trigeminal neuralgia   Anemia, iron deficiency  Deafness  Membranoproliferative glomerulonephritis   Chronic kidney disease, stage 3   Glaucoma   Cataract   Generalized anxiety disorder   Lumbar compression fracture   Chronic pain  Chronic constipation    Past Surgical History:    Phacoemulsification clear cornea with intraocular lens implantation 6/2/14  Gyn surgery  Orthopedic surgery    Family History:    History reviewed. No pertinent family history.     Social History:  Marital Status:   Presents to the ED with her son  Tobacco Use: No previous or current tobacco use.  Alcohol Use: No alcohol use.   Living Situation: [facility]   PCP: Man Stovall MD      Review of Systems   Respiratory: Negative for shortness of breath.    Cardiovascular: Negative for chest pain.   Gastrointestinal: Negative for abdominal pain.   Musculoskeletal: Positive  for back pain. Negative for neck pain.        Negative for pelvis and leg pain.   Skin:        Positive for bruising.   All other systems reviewed and are negative.    Physical Exam   First Vitals:  BP: 150/59  Pulse: 63  Temp: 98  F (36.7  C)  Resp: 16  SpO2: 96 %    Physical Exam  Nursing note and vitals reviewed.    Constitutional:  Appears well-developed and well-nourished, comfortable. Hard of hearing.  HENT:     Nose normal.  No discharge.      Oropharynx is clear and moist.  Eyes:    Conjunctivae are normal without injection. No lid droop.     Pupils are equal, round, and reactive to light.   Cardiovascular:  Normal rate, regular rhythm with normal S1 and S2.      Normal heart sounds and peripheral pulses 2+ and equal.       No murmur or shoaib.  Pulmonary:  Effort normal and breath sounds clear to auscultation bilaterally        No respiratory distress.  No stridor.     No wheezes. No rales.    GI:    Soft. No distension and no mass. No tenderness.      No rebound and no guarding. No flank pain.  No HSM.  Musculoskeletal:  Normal range of motion. No extremity deformity.     No edema.  No cyanosis.                                      Neck supple without pain.     Severe kyphoscoliosis      Tenderness over lower thoracic/upper lumbar spine.     Generalized weakness, poor muscle tone     Old bruises on lateral ribs which are not tender.  Neurological:   Alert and oriented. No cranial nerve deficit, no facial droop.     Exhibits poor muscle tone. Coordination normal.      GCS eye subscore is 4. GCS verbal subscore is 5.      GCS motor subscore is 6.   Skin:    Skin is warm and dry. No rash noted. No diaphoresis.      No erythema. No pallor.  She does have some bruising over the left lateral chest, old bruises noted.  Psychiatric:   Behavior is normal. Appropriate mood and affect.     Judgment and thought content normal.     Emergency Department Course     Imaging:  Thoracic/lumbar spine, 2 views:  IMPRESSION:  There is anterior wedging of the T11 and L1 vertebra with approximately 50% height loss in each, suggestive of age-indeterminate compression fractures. Grade 1 retrolisthesis of L1 on L2. Moderate to severe degenerative changes throughout the visualized spine. There is atherosclerotic calcification of the abdominal aorta.   Report per radiology.     Pelvis x-ray:  IMPRESSION: Status post right total hip arthroplasty. Hardware is intact. Alignment is anatomic.   Report per radiology.     Radiographic findings were communicated with the patient and family who voiced understanding of the findings.    Interventions:  (1136) Tylenol, 1000 mg, PO   (1310) Tramadol, 50 mg, PO    Emergency Department Course:  Nursing notes and vitals reviewed.  I performed an exam of the patient as documented above.     The patient was sent for a x-rays of the thoracic/lumbar spine and pelvis while in the emergency department, findings above.      Findings and plan explained to the patient and family. Patient discharged home with instructions regarding supportive care, medications, and reasons to return. The importance of close follow-up was reviewed.     Impression & Plan      Medical Decision Making:  Patient comes in with low back pain after falling on her bottom.  She is supposed to call for help but often does not because she said it takes the nurses too long to get to her and then she has to go to the bathroom.  She has compression fractures of T11 and L1.  These could be new as this is where her acute pain is at.  She was given a Tramadol tablet and Tylenol here.  No other apparent injuries noted.  She will be discharged back to the care center and the son is going to talk with the nursing staff there to make them more attentive to when she needs help with transfers.  She was strongly encouraged to always call for help with transfers.  She will follow-up in the clinic this week and I am going to place her on Tramadol and  Tylenol.    Diagnosis:    ICD-10-CM    1. Fall, initial encounter W19.XXXA    2. Closed compression fracture of thoracic vertebra, initial encounter (H) S22.000A     T11   3. Closed compression fracture of first lumbar vertebra, initial encounter (H) S32.010A      Disposition:  Discharged to home.     Patient instructions:  Tylenol and/or Tramadol as needed for pain.  You must call for help whenever you are going to transfer either out of bed or to the bathroom.  If they are unable to help you in an appropriate amount of time, you may need to increase your nursing care level.  Follow-up in the clinic if your pain is not adequately controlled with the Tramadol    Discharge Medications:  None       I, Marie Washburn, am serving as a scribe on 10/28/2018 at 11:26 AM to personally document services performed by Dr. Pack based on my observations and the provider's statements to me.    Marie Washburn  10/28/2018    EMERGENCY DEPARTMENT       Carmen Pack MD  10/28/18 [id number]

## 2018-10-28 NOTE — ED AVS SNAPSHOT
Emergency Department    [address] [place] [address]    Phone:  [phone]    Fax:  [phone]                                       Kalli Brown   MRN: [id number]    Department:   Emergency Department   Date of Visit:  10/28/2018           After Visit Summary Signature Page     I have received my discharge instructions, and my questions have been answered. I have discussed any challenges I see with this plan with the nurse or doctor.    ..........................................................................................................................................  Patient/Patient Representative Signature      ..........................................................................................................................................  Patient Representative Print Name and Relationship to Patient    ..................................................               ................................................  Date                                   Time    ..........................................................................................................................................  Reviewed by Signature/Title    ...................................................              ..............................................  Date                                               Time          22EPIC Rev 08/18

## 2018-10-28 NOTE — DISCHARGE INSTRUCTIONS
Tylenol and/or tramadol as needed for pain.  You must call for help whenever you are going to transfer either out of bed or to the bathroom.  If they are unable to help you in an appropriate amount of time, you may need to increase your nursing care level.  Follow-up in the clinic if your pain is not adequately controlled with the tramadol        Back Fracture (Compression Fracture)  Your spine stretches from the base of your skull to your tailbone. It's composed of 33 bones (vertebrae) stacked on top of one another. These bones are strong enough to support the weight of your upper body. Certain injuries, however, can damage one or more of the vertebrae and cause them to collapse. A collapsed bone in your spine is known as a compression fracture.    Causes of compression fracture  Many compression fractures result from osteoporosis. This disease thins your bones so they can't withstand normal pressure. Trauma from a car accident or hard fall can fracture even healthy vertebrae. In rare cases, vertebrae may fracture for unknown reasons.  When to go to the emergency room (ER)  Call 911 if you've been in an accident or had a fall and have neck or back pain, especially when pain occurs with any of these symptoms:    Loss of control over your bowels or bladder    Numbness or weakness    High fever    Unexplained back pain in a person with cancer  What to expect in the ER  A healthcare provider will ask about your health history and examine you. In some cases, you may have X-rays. You also may have other tests, such as a CT scan or MRI. These tests can provide detailed images of your bones and spinal cord.  Treatment  Treatment will depend on the type and cause of the fracture. You will be given medicine for pain. Severe fractures or those that cause nerve problems may need surgery. Many compression fractures mend on their own.  Follow-up  As you improve, you may be given exercises to strengthen your bones. If you have  osteoporosis, your healthcare provider may prescribe a medicine to treat it. Sometimes you may have pain even after the bone has healed. In that case, your healthcare provider will discuss your  Date Last Reviewed: 9/30/2015 2000-2017 [employer]. [address] [place] [address], [place] [address]. All rights reserved. This information is not intended as a substitute for professional medical care. Always follow your healthcare professional's instructions.

## 2018-11-02 ENCOUNTER — MEDICAL CORRESPONDENCE (OUTPATIENT)
Dept: HEALTH INFORMATION MANAGEMENT | Facility: CLINIC | Age: 83
End: 2018-11-02

## 2018-11-05 ENCOUNTER — TELEPHONE (OUTPATIENT)
Dept: FAMILY MEDICINE | Facility: CLINIC | Age: 83
End: 2018-11-05

## 2018-11-05 DIAGNOSIS — M25.511 NONTRAUMATIC SHOULDER PAIN, RIGHT: Primary | ICD-10-CM

## 2018-11-05 DIAGNOSIS — G89.29 OTHER CHRONIC PAIN: ICD-10-CM

## 2018-11-05 RX ORDER — TRAMADOL HYDROCHLORIDE 50 MG/1
50 TABLET ORAL EVERY 6 HOURS PRN
Qty: 60 TABLET | Status: SHIPPED | OUTPATIENT
Start: 2018-11-05 | End: 2019-01-01

## 2018-11-05 RX ORDER — TRAMADOL HYDROCHLORIDE 50 MG/1
TABLET ORAL
Refills: 0 | COMMUNITY
Start: 2018-10-28 | End: 2018-11-05

## 2018-11-05 NOTE — TELEPHONE ENCOUNTER
Tramadol      Last Written Prescription Date:  10/28/18  (prescribed by ER doc)  Last Fill Quantity: unk,   # refills: unk  Last Office Visit: 07/02/18  Future Office visit:    Next 5 appointments (look out 90 days)     Nov 07, 2018 12:00 PM CST   [facility]    [address] [facility] [address]   [phone]                   Routing refill request to provider for review/approval because:  Drug not on the [facility], [facility] or [facility] refill protocol or controlled substance      Adele Simpson MA

## 2018-11-05 NOTE — TELEPHONE ENCOUNTER
"Reason for Call:  Medication or medication refill:    Do you use a [facility]?  Name of the pharmacy and phone number for the current request:   [facility] [address] [facility] LOCATION #[id number]  Name of the medication requested: TRAMADOL     Other request: IT LOOKS LIKE THIS WAS ORIGINALLY PRESCRIBED BY AN ER MD, BUT ASSISTED LIVING FACILITY IS REQUESTING NEW RX. RN STATES \"SHE JUST TOOK HER LAST ONE 30 MINUTES AGO\"    Can we leave a detailed message on this number? YES      Best Time: ANYTIME    Call taken on 11/5/2018 at 1:31 PM by Raquel Branch      "

## 2018-11-07 ENCOUNTER — OFFICE VISIT (OUTPATIENT)
Dept: FAMILY MEDICINE | Facility: CLINIC | Age: 83
End: 2018-11-07
Payer: COMMERCIAL

## 2018-11-07 VITALS
WEIGHT: 105 LBS | TEMPERATURE: 97.9 F | OXYGEN SATURATION: 96 % | DIASTOLIC BLOOD PRESSURE: 66 MMHG | BODY MASS INDEX: 17.49 KG/M2 | HEIGHT: 65 IN | HEART RATE: 60 BPM | SYSTOLIC BLOOD PRESSURE: 153 MMHG

## 2018-11-07 DIAGNOSIS — S32.010D CLOSED COMPRESSION FRACTURE OF L1 LUMBAR VERTEBRA WITH ROUTINE HEALING, SUBSEQUENT ENCOUNTER: ICD-10-CM

## 2018-11-07 DIAGNOSIS — R32 URINARY INCONTINENCE, UNSPECIFIED TYPE: ICD-10-CM

## 2018-11-07 DIAGNOSIS — F41.1 GAD (GENERALIZED ANXIETY DISORDER): ICD-10-CM
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
## 2018-11-07 PROCEDURE — 99214 OFFICE O/P EST MOD 30 MIN: CPT | Performed by: INTERNAL MEDICINE

## 2018-11-07 RX ORDER — MIRABEGRON 25 MG/1
25 TABLET, FILM COATED, EXTENDED RELEASE ORAL DAILY
Qty: 90 TABLET | Refills: 3 | Status: SHIPPED | OUTPATIENT
Start: 2018-11-07 | End: 2018-11-20

## 2018-11-07 NOTE — PATIENT INSTRUCTIONS
(W19.XXXS) Fall, sequela  (primary encounter diagnosis)  Comment: We will continue with current dose of tramadol and try to keep up with stool softeners.  Plan:     (F41.1) DAVID (generalized anxiety disorder)  Comment: Noted worsening depression with outbursts of agitation.  We will plan to increase your dose of sertraline from 50 mg to 100 mg.  Plan: sertraline (ZOLOFT) 50 MG tablet          Urinary incontinence  Comment: we will start a new medication of Myrbetriq to help with urinary symptoms

## 2018-11-07 NOTE — MR AVS SNAPSHOT
After Visit Summary   11/7/2018    Klali Brown    MRN: [id number]           Patient Information     Date Of Birth          2/26/1929        Visit Information        Provider Department      11/7/2018 12:00 PM Man Stovall MD [facility]        Today's Diagnoses     Fall, sequela    -  1    DAVID (generalized anxiety disorder)        Urinary incontinence, unspecified type          Care Instructions    (W19.XXXS) Fall, sequela  (primary encounter diagnosis)  Comment: We will continue with current dose of tramadol and try to keep up with stool softeners.  Plan:     (F41.1) DAVID (generalized anxiety disorder)  Comment: Noted worsening depression with outbursts of agitation.  We will plan to increase your dose of sertraline from 50 mg to 100 mg.  Plan: sertraline (ZOLOFT) 50 MG tablet          Urinary incontinence  Comment: we will start a new medication of Myrbetriq to help with urinary symptoms             Follow-ups after your visit        Who to contact     If you have questions or need follow up information about today's clinic visit or your schedule please contact [facility] directly at [phone].  Normal or non-critical lab and imaging results will be communicated to you by MyChart, letter or phone within 4 business days after the clinic has received the results. If you do not hear from us within 7 days, please contact the clinic through MyChart or phone. If you have a critical or abnormal lab result, we will notify you by phone as soon as possible.  Submit refill requests through [facility] or call your pharmacy and they will forward the refill request to us. Please allow 3 business days for your refill to be completed.          Additional Information About Your Visit        Care EveryWhere ID     This is your Care EveryWhere ID. This could be used by other organizations to access your [facility] medical records  [id number]        Your Vitals Were      "Pulse Temperature Height Pulse Oximetry Breastfeeding? BMI (Body Mass Index)    60 97.9  F (36.6  C) (Oral) 5' 5\" (1.651 m) 96% No 17.47 kg/m2       Blood Pressure from Last 3 Encounters:   11/07/18 153/66   10/28/18 150/59   09/08/18 152/68    Weight from Last 3 Encounters:   11/07/18 105 lb (47.6 kg)   07/22/18 105 lb (47.6 kg)   01/29/18 100 lb (45.4 kg)              Today, you had the following     No orders found for display         Today's Medication Changes          These changes are accurate as of 11/7/18 12:42 PM.  If you have any questions, ask your nurse or doctor.               Start taking these medicines.        Dose/Directions    mirabegron 25 MG 24 hr tablet   Commonly known as:  MYRBETRIQ   Used for:  Urinary incontinence, unspecified type   Started by:  Man Stovall MD        Dose:  25 mg   Take 1 tablet (25 mg) by mouth daily   Quantity:  90 tablet   Refills:  3         These medicines have changed or have updated prescriptions.        Dose/Directions    sertraline 50 MG tablet   Commonly known as:  ZOLOFT   This may have changed:    - medication strength  - how much to take   Used for:  DAVID (generalized anxiety disorder)   Changed by:  Man Stovall MD        Dose:  50 mg   Take 1 tablet (50 mg) by mouth daily   Quantity:  90 tablet   Refills:  3            Where to get your medicines      These medications were sent to [facility], [address], [facility] [address]     Phone:  [phone]     mirabegron 25 MG 24 hr tablet    sertraline 50 MG tablet                Primary Care Provider Office Phone # Fax #    Man Stovall [phone] [address] [place] [address]        Equal Access to Services     KASH FIGUEROA AH: Hadii juve ku hadasho Soomaali, waaxda luqadaha, qaybta kaalmada adeegyada, waxay kesha hayjacky georges ah. So wa " [phone].    ATENCIÓN: Si louise torres, tiene a chiu disposición servicios gratuitos de asistencia lingüística. Meng cooper [phone].    We comply with applicable federal civil rights laws and [place] laws. We do not discriminate on the basis of race, color, national origin, age, disability, sex, sexual orientation, or gender identity.            Thank you!     Thank you for choosing [facility]  for your care. Our goal is always to provide you with excellent care. Hearing back from our patients is one way we can continue to improve our services. Please take a few minutes to complete the written survey that you may receive in the mail after your visit with us. Thank you!             Your Updated Medication List - Protect others around you: Learn how to safely use, store and throw away your medicines at www.disposemymeds.org.          This list is accurate as of 11/7/18 12:42 PM.  Always use your most recent med list.                   Brand Name Dispense Instructions for use Diagnosis    acetaminophen 500 MG tablet    TYLENOL    540 tablet    Take 2 tablets (1,000 mg) by mouth 3 times daily    Chronic low back pain, unspecified back pain laterality, with sciatica presence unspecified       amLODIPine 10 MG tablet    NORVASC    30 tablet    Take 1 tablet (10 mg) by mouth daily    Benign essential hypertension       aspirin 81 MG chewable tablet    ASPIRIN LOW DOSE    30 tablet    1 TAB BY MOUTH DAILY (DX:ANTICOAGULATION)    Benign essential hypertension       azithromycin 250 MG tablet    ZITHROMAX    6 tablet    Two tablets first day, then one tablet daily for four days.        calcium citrate-vitamin D 315-250 MG-UNIT Tabs per tablet    CITRACAL    180 tablet    Take 1 tablet by mouth 2 times daily -Take with food    Osteoporosis, unspecified osteoporosis type, unspecified pathological fracture presence       carBAMazepine 200 MG tablet    TEGretol    75 tablet    Take 200 mg in the AM and 300 mg  in the PM.    Trigeminal neuralgia       docusate sodium 250 MG capsule    COLACE    90 capsule    Take 1 capsule (250 mg) by mouth daily as needed for constipation    Constipation, unspecified constipation type       ferrous sulfate 325 (65 Fe) MG tablet    IRON    60 tablet    Take 1 tablet (325 mg) by mouth 2 times daily (with meals)    Iron deficiency anemia, unspecified iron deficiency anemia type       furosemide 80 MG tablet    LASIX    90 tablet    Take 1 tablet (80 mg) by mouth daily    Benign essential hypertension       HALLS COUGH DROPS 7 MG Lozg   Generic drug:  menthol     84 lozenge    Take 1 lozenge (7 mg) by mouth every hour as needed for cough        irbesartan-hydrochlorothiazide 300-12.5 MG per tablet    AVALIDE    30 tablet    Take 1 tablet by mouth daily    CKD (chronic kidney disease) stage 3, GFR 30-59 ml/min (H)       latanoprost 0.005 % ophthalmic solution    XALATAN     Place 1 drop into both eyes At Bedtime        mirabegron 25 MG 24 hr tablet    MYRBETRIQ    90 tablet    Take 1 tablet (25 mg) by mouth daily    Urinary incontinence, unspecified type       mirtazapine 7.5 MG Tabs tablet    REMERON    90 tablet    Take 1 tablet (7.5 mg) by mouth At Bedtime    Primary insomnia       polyethylene glycol powder    MIRALAX    510 g    Take 17 g by mouth daily as needed    Constipation, unspecified constipation type       sertraline 50 MG tablet    ZOLOFT    90 tablet    Take 1 tablet (50 mg) by mouth daily    DAVID (generalized anxiety disorder)       traMADol 50 MG tablet    ULTRAM    60 tablet    Take 1 tablet (50 mg) by mouth every 6 hours as needed for severe pain    Nontraumatic shoulder pain, right, Other chronic pain       Vitamin D (Cholecalciferol) 1000 units Tabs     30 tablet    Take 1,000 Units by mouth daily    Vitamin D deficiency

## 2018-11-07 NOTE — PROGRESS NOTES
"[facility]  CLINIC PROGRESS NOTE    Subjective:   Kalli Brown was recently seen in the emergency department following pain after a fall.  She had fallen in her bathroom landing on her buttocks.  She did not hit her head and she did not have any loss of consciousness.  Her son noted that she has had 6 falls in the past 6 weeks typically when transferring from her wheelchair and in the bathroom.  X-ray showed compression fractures of T11 and L1 which were thought to be possibly new.  She was recommended treatment with tramadol and Tylenol.  She was not taking tramadol prior to this encounter.  Her pain is fairly well managed and she is comfortable sitting in her wheel jeremy currently.       Past medical history, medications, allergies, social history, family history reviewed and updated in EPIC as of 11/7/2018 .    ROS  CONSTITUTIONAL: + fatigue, sleep has been impaired.    SKIN: no worrisome rashes, no worrisome moles, no worrisome lesions  EYES: no acute vision problems or changes  ENT: no ear problems, no mouth problems, no throat problems  RESP: no significant cough, no shortness of breath  CV: no chest pain, no palpitations, no new or worsening peripheral edema  GI: no nausea, no vomiting, no constipation, no diarrhea  : urinary incontinence noted which is not a new issue, but something she would be willing to consider medication to treat  MS: as above   PSYCHIATRIC: both sons who are present are concerned for her freuqent outbursts at her current place of living and her relative impatience.  She she has been noted to be physically aggressive with her care givers at times, although she denies this.  Her anxiety seems to be the issue.  She is noted to be very anxious at times when she feels vulnerable.        Objective:  Vitals  /66 (BP Location: Right arm, Patient Position: Chair, Cuff Size: Adult Regular)  Pulse 60  Temp 97.9  F (36.6  C) (Oral)  Ht 5' 5\" (1.651 m)  Wt 105 lb " (47.6 kg)  SpO2 96%  Breastfeeding? No  BMI 17.47 kg/m2  GEN: Alert Oriented to place and person  HEENT: Atraumatic, normocephalic, neck supple,    CV: RRR no murmurs or rubs  PULM: CTA no wheezes or crackles  ABD: Soft, nontender nondistended, no hepatosplenomegally  SKIN: No visible skin lesion or ulcerations  EXT: 2+ dorsal pedis pulses, no edema bilateral lower extremities  NEURO: Gait and station deferred - wheel chair  PSYCH: Mood good today, affect mood congruent    No images are attached to the encounter.    No results found for this or any previous visit (from the past 24 hour(s)).    Assessment/Plan:  Patient Instructions   (W19.XXXS) Fall, sequela  (primary encounter diagnosis)  Comment: We will continue with current dose of tramadol and try to keep up with stool softeners.  Plan:     (F41.1) DAVID (generalized anxiety disorder)  Comment: Noted worsening depression with outbursts of agitation.  We will plan to increase your dose of sertraline from 50 mg to 100 mg.  Plan: sertraline (ZOLOFT) 50 MG tablet          Urinary incontinence  Comment: we will start a new medication of Myrbetriq to help with urinary symptoms       Follow up in 3 months    Disclaimer: This note consists of symbols derived from keyboarding, dictation and/or voice recognition software. As a result, there may be errors in the script that have gone undetected. Please consider this when interpreting information found in this chart.    Man Stovall MD  [phone]

## 2018-11-09 PROBLEM — S32.010D CLOSED COMPRESSION FRACTURE OF L1 LUMBAR VERTEBRA WITH ROUTINE HEALING, SUBSEQUENT ENCOUNTER: Status: ACTIVE | Noted: 2018-11-09

## 2018-11-18 ENCOUNTER — HOSPITAL ENCOUNTER (EMERGENCY)
Facility: CLINIC | Age: 83
Discharge: HOME | End: 2018-11-18
Attending: EMERGENCY MEDICINE | Admitting: EMERGENCY MEDICINE
Payer: MEDICARE

## 2018-11-18 ENCOUNTER — APPOINTMENT (OUTPATIENT)
Dept: GENERAL RADIOLOGY | Facility: CLINIC | Age: 83
End: 2018-11-18
Attending: EMERGENCY MEDICINE
Payer: MEDICARE

## 2018-11-18 ENCOUNTER — APPOINTMENT (OUTPATIENT)
Dept: CT IMAGING | Facility: CLINIC | Age: 83
End: 2018-11-18
Attending: EMERGENCY MEDICINE
Payer: MEDICARE

## 2018-11-18 VITALS
BODY MASS INDEX: 16.64 KG/M2 | DIASTOLIC BLOOD PRESSURE: 80 MMHG | HEART RATE: 68 BPM | WEIGHT: 100 LBS | SYSTOLIC BLOOD PRESSURE: 159 MMHG | OXYGEN SATURATION: 96 % | TEMPERATURE: 98.4 F | RESPIRATION RATE: 14 BRPM

## 2018-11-18 DIAGNOSIS — J18.9 COMMUNITY ACQUIRED PNEUMONIA, UNSPECIFIED LATERALITY: ICD-10-CM

## 2018-11-18 LAB
ANION GAP SERPL CALCULATED.3IONS-SCNC: 7 MMOL/L (ref 3–14)
BASOPHILS # BLD AUTO: 0 10E9/L (ref 0–0.2)
BASOPHILS NFR BLD AUTO: 0.4 %
BUN SERPL-MCNC: 42 MG/DL (ref 7–30)
CALCIUM SERPL-MCNC: 8.7 MG/DL (ref 8.5–10.1)
CHLORIDE SERPL-SCNC: 107 MMOL/L (ref 94–109)
CO2 SERPL-SCNC: 27 MMOL/L (ref 20–32)
CREAT SERPL-MCNC: 1 MG/DL (ref 0.52–1.04)
D DIMER PPP FEU-MCNC: 1.6 UG/ML FEU (ref 0–0.5)
DIFFERENTIAL METHOD BLD: ABNORMAL
EOSINOPHIL # BLD AUTO: 0.2 10E9/L (ref 0–0.7)
EOSINOPHIL NFR BLD AUTO: 2 %
ERYTHROCYTE [DISTWIDTH] IN BLOOD BY AUTOMATED COUNT: 12.6 % (ref 10–15)
FLUAV+FLUBV AG SPEC QL: NEGATIVE
FLUAV+FLUBV AG SPEC QL: NEGATIVE
GFR SERPL CREATININE-BSD FRML MDRD: 52 ML/MIN/1.7M2
GLUCOSE SERPL-MCNC: 98 MG/DL (ref 70–99)
HCT VFR BLD AUTO: 25.8 % (ref 35–47)
HGB BLD-MCNC: 8.5 G/DL (ref 11.7–15.7)
IMM GRANULOCYTES # BLD: 0 10E9/L (ref 0–0.4)
IMM GRANULOCYTES NFR BLD: 0.4 %
INTERPRETATION ECG - MUSE: NORMAL
LYMPHOCYTES # BLD AUTO: 1.6 10E9/L (ref 0.8–5.3)
LYMPHOCYTES NFR BLD AUTO: 19.8 %
MCH RBC QN AUTO: 32.9 PG (ref 26.5–33)
MCHC RBC AUTO-ENTMCNC: 32.9 G/DL (ref 31.5–36.5)
MCV RBC AUTO: 100 FL (ref 78–100)
MONOCYTES # BLD AUTO: 0.5 10E9/L (ref 0–1.3)
MONOCYTES NFR BLD AUTO: 6.5 %
NEUTROPHILS # BLD AUTO: 5.7 10E9/L (ref 1.6–8.3)
NEUTROPHILS NFR BLD AUTO: 70.9 %
NT-PROBNP SERPL-MCNC: 1082 PG/ML (ref 0–1800)
PLATELET # BLD AUTO: 446 10E9/L (ref 150–450)
POTASSIUM SERPL-SCNC: 4.5 MMOL/L (ref 3.4–5.3)
RBC # BLD AUTO: 2.58 10E12/L (ref 3.8–5.2)
SODIUM SERPL-SCNC: 141 MMOL/L (ref 133–144)
SPECIMEN SOURCE: NORMAL
TROPONIN I SERPL-MCNC: <0.015 UG/L (ref 0–0.04)
WBC # BLD AUTO: 8 10E9/L (ref 4–11)

## 2018-11-18 PROCEDURE — 71260 CT THORAX DX C+: CPT

## 2018-11-18 PROCEDURE — 99285 EMERGENCY DEPT VISIT HI MDM: CPT | Mod: 25

## 2018-11-18 PROCEDURE — 71046 X-RAY EXAM CHEST 2 VIEWS: CPT

## 2018-11-18 PROCEDURE — 80048 BASIC METABOLIC PNL TOTAL CA: CPT | Performed by: EMERGENCY MEDICINE

## 2018-11-18 PROCEDURE — 94640 AIRWAY INHALATION TREATMENT: CPT

## 2018-11-18 PROCEDURE — 25000128 H RX IP 250 OP 636: Performed by: EMERGENCY MEDICINE

## 2018-11-18 PROCEDURE — 87804 INFLUENZA ASSAY W/OPTIC: CPT | Performed by: EMERGENCY MEDICINE

## 2018-11-18 PROCEDURE — 85025 COMPLETE CBC W/AUTO DIFF WBC: CPT | Performed by: EMERGENCY MEDICINE

## 2018-11-18 PROCEDURE — 84484 ASSAY OF TROPONIN QUANT: CPT | Performed by: EMERGENCY MEDICINE

## 2018-11-18 PROCEDURE — 83880 ASSAY OF NATRIURETIC PEPTIDE: CPT | Performed by: EMERGENCY MEDICINE

## 2018-11-18 PROCEDURE — 85379 FIBRIN DEGRADATION QUANT: CPT | Performed by: EMERGENCY MEDICINE

## 2018-11-18 PROCEDURE — 93005 ELECTROCARDIOGRAM TRACING: CPT

## 2018-11-18 PROCEDURE — 25000125 ZZHC RX 250: Performed by: EMERGENCY MEDICINE

## 2018-11-18 RX ORDER — IPRATROPIUM BROMIDE AND ALBUTEROL SULFATE 2.5; .5 MG/3ML; MG/3ML
3 SOLUTION RESPIRATORY (INHALATION) ONCE
Status: COMPLETED | OUTPATIENT
Start: 2018-11-18 | End: 2018-11-18

## 2018-11-18 RX ORDER — IOPAMIDOL 755 MG/ML
49 INJECTION, SOLUTION INTRAVASCULAR ONCE
Status: COMPLETED | OUTPATIENT
Start: 2018-11-18 | End: 2018-11-18

## 2018-11-18 RX ORDER — AZITHROMYCIN 250 MG/1
TABLET, FILM COATED ORAL
Qty: 6 TABLET | Refills: 0 | Status: SHIPPED | OUTPATIENT
Start: 2018-11-18 | End: 2018-11-23

## 2018-11-18 RX ADMIN — SODIUM CHLORIDE, PRESERVATIVE FREE 88 ML: 5 INJECTION INTRAVENOUS at 13:04

## 2018-11-18 RX ADMIN — IPRATROPIUM BROMIDE AND ALBUTEROL SULFATE 3 ML: .5; 2.5 SOLUTION RESPIRATORY (INHALATION) at 12:06

## 2018-11-18 RX ADMIN — IOPAMIDOL 49 ML: 755 INJECTION, SOLUTION INTRAVENOUS at 13:03

## 2018-11-18 NOTE — ED AVS SNAPSHOT
Emergency Department    [address] [place] [address]    Phone:  [phone]    Fax:  [phone]                                       Kalli Brown   MRN: [id number]    Department:   Emergency Department   Date of Visit:  11/18/2018           Patient Information     Date Of Birth          2/26/1929        Your diagnoses for this visit were:     Community acquired pneumonia, unspecified laterality        You were seen by Bear Rangel MD.      Follow-up Information     Follow up with Man Stovall MD. Schedule an appointment as soon as possible for a visit in 3 days.    Specialty:  Internal Medicine    Contact information:    [address]  [facility] [address]  [phone]          Discharge Instructions         Pneumonia (Adult)  Pneumonia is an infection deep within the lungs. It is in the small air sacs (alveoli). Pneumonia may be caused by a virus or bacteria. Pneumonia caused by bacteria is usually treated with an antibiotic. Severe cases may need to be treated in the hospital. Milder cases can be treated at home. Symptoms usually start to get better during the first 2 days of treatment.    Home care  Follow these guidelines when caring for yourself at home:    Rest at home for the first 2 to 3 days, or until you feel stronger. Don t let yourself get overly tired when you go back to your activities.    Stay away from cigarette smoke - yours or other people s.    You may use acetaminophen or ibuprofen to control fever or pain, unless another medicine was prescribed. If you have chronic liver or kidney disease, talk with your healthcare provider before using these medicines. Also talk with your provider if you ve had a stomach ulcer or gastrointestinal bleeding. Don t give aspirin to anyone younger than 18 years of age who is ill with a fever. It may cause severe liver damage.    Your appetite may be poor, so a light diet is fine.    Drink 6 to 8 glasses  of fluids every day to make sure you are getting enough fluids. Beverages can include water, sport drinks, sodas without caffeine, juices, tea, or soup. Fluids will help loosen secretions in the lung. This will make it easier for you to cough up the phlegm (sputum). If you also have heart or kidney disease, check with your healthcare provider before you drink extra fluids.    Take antibiotic medicine prescribed until it is all gone, even if you are feeling better after a few days.  Follow-up care  Follow up with your healthcare provider in the next 2 to 3 days, or as advised. This is to be sure the medicine is helping you get better.  If you are 65 or older, you should get a pneumococcal vaccine and a yearly flu (influenza) shot. You should also get these vaccines if you have chronic lung disease like asthma, emphysema, or COPD. Recently, a second type of pneumonia vaccine has become available for everyone over 65 years old. This is in addition to the previous vaccine. Ask your provider about this.  When to seek medical advice  Call your healthcare provider right away if any of these occur:    You don t get better within the first 48 hours of treatment    Shortness of breath gets worse    Rapid breathing (more than 25 breaths per minute)    Coughing up blood    Chest pain gets worse with breathing    Fever of 100.4 F (38 C) or higher that doesn t get better with fever medicine    Weakness, dizziness, or fainting that gets worse    Thirst or dry mouth that gets worse    Sinus pain, headache, or a stiff neck    Chest pain not caused by coughing  Date Last Reviewed: 1/1/2017 2000-2018 [employer]. [address] [place] [address], [place] [id number]. All rights reserved. This information is not intended as a substitute for professional medical care. Always follow your healthcare professional's instructions.          24 Hour Appointment Hotline       To make an appointment at any [facility], call  [phone] ([phone]). If you don't have a family doctor or clinic, we will help you find one. [facility] clinics are conveniently located to serve the needs of you and your family.             Review of your medicines      CONTINUE these medicines which may have CHANGED, or have new prescriptions. If we are uncertain of the size of tablets/capsules you have at home, strength may be listed as something that might have changed.        Dose / Directions Last dose taken    azithromycin 250 MG tablet   Commonly known as:  ZITHROMAX Z-DEBBIE   What changed:  additional instructions   Quantity:  6 tablet        Two tablets on the first day, then one tablet daily for the next 4 days   Refills:  0          Our records show that you are taking the medicines listed below. If these are incorrect, please call your family doctor or clinic.        Dose / Directions Last dose taken    acetaminophen 500 MG tablet   Commonly known as:  TYLENOL   Dose:  1000 mg   Quantity:  540 tablet        Take 2 tablets (1,000 mg) by mouth 3 times daily   Refills:  0        amLODIPine 10 MG tablet   Commonly known as:  NORVASC   Dose:  10 mg   Quantity:  30 tablet        Take 1 tablet (10 mg) by mouth daily   Refills:  11        aspirin 81 MG chewable tablet   Commonly known as:  ASPIRIN LOW DOSE   Quantity:  30 tablet        1 TAB BY MOUTH DAILY (DX:ANTICOAGULATION)   Refills:  11        calcium citrate-vitamin D 315-250 MG-UNIT Tabs per tablet   Commonly known as:  CITRACAL   Dose:  1 tablet   Quantity:  180 tablet        Take 1 tablet by mouth 2 times daily -Take with food   Refills:  3        carBAMazepine 200 MG tablet   Commonly known as:  TEGretol   Quantity:  75 tablet        Take 200 mg in the AM and 300 mg in the PM.   Refills:  11        docusate sodium 250 MG capsule   Commonly known as:  COLACE   Dose:  250 mg   Quantity:  90 capsule        Take 1 capsule (250 mg) by mouth daily as needed for constipation   Refills:  3         ferrous sulfate 325 (65 Fe) MG tablet   Commonly known as:  IRON   Dose:  325 mg   Quantity:  60 tablet        Take 1 tablet (325 mg) by mouth 2 times daily (with meals)   Refills:  11        furosemide 80 MG tablet   Commonly known as:  LASIX   Dose:  80 mg   Quantity:  90 tablet        Take 1 tablet (80 mg) by mouth daily   Refills:  3        HALLS COUGH DROPS 7 MG Lozg   Dose:  1 lozenge   Quantity:  84 lozenge   Generic drug:  menthol        Take 1 lozenge (7 mg) by mouth every hour as needed for cough   Refills:  0        irbesartan-hydrochlorothiazide 300-12.5 MG tablet   Commonly known as:  AVALIDE   Dose:  1 tablet   Quantity:  30 tablet        Take 1 tablet by mouth daily   Refills:  11        latanoprost 0.005 % ophthalmic solution   Commonly known as:  XALATAN   Dose:  1 drop        Place 1 drop into both eyes At Bedtime   Refills:  0        mirabegron 25 MG 24 hr tablet   Commonly known as:  MYRBETRIQ   Dose:  25 mg   Quantity:  90 tablet        Take 1 tablet (25 mg) by mouth daily   Refills:  3        mirtazapine 7.5 MG Tabs tablet   Commonly known as:  REMERON   Dose:  7.5 mg   Quantity:  90 tablet        Take 1 tablet (7.5 mg) by mouth At Bedtime   Refills:  3        polyethylene glycol powder   Commonly known as:  MIRALAX   Dose:  17 g   Quantity:  510 g        Take 17 g by mouth daily as needed   Refills:  3        sertraline 50 MG tablet   Commonly known as:  ZOLOFT   Dose:  50 mg   Quantity:  90 tablet        Take 1 tablet (50 mg) by mouth daily   Refills:  3        traMADol 50 MG tablet   Commonly known as:  ULTRAM   Dose:  50 mg   Quantity:  60 tablet        Take 1 tablet (50 mg) by mouth every 6 hours as needed for severe pain   Refills:  0a        Vitamin D (Cholecalciferol) 1000 units Tabs   Dose:  1000 Units   Quantity:  30 tablet        Take 1,000 Units by mouth daily   Refills:  11                Prescriptions were sent or printed at these locations (1 Prescription)                   Other  Prescriptions                Printed at Department/Unit printer (1 of 1)         azithromycin (ZITHROMAX Z-DEBBIE) 250 MG tablet                Procedures and tests performed during your visit     Basic metabolic panel    CBC with platelets differential    Chest CT, IV contrast only - PE protocol    D dimer quantitative    EKG 12-lead, tracing only    Influenza A/B antigen    Nt probnp inpatient (BNP)    Troponin I    XR Chest 2 Views      Orders Needing Specimen Collection     None      Pending Results     Date and Time Order Name Status Description    11/18/2018 1149 Chest CT, IV contrast only - PE protocol Preliminary             Pending Culture Results     No orders found from 11/16/2018 to 11/19/2018.            Pending Results Instructions     If you had any lab results that were not finalized at the time of your Discharge, you can call the ED Lab Result RN at [phone]. You will be contacted by this team for any positive Lab results or changes in treatment. The nurses are available 7 days a week from 10A to 6:30P.  You can leave a message 24 hours per day and they will return your call.        Test Results From Your Hospital Stay        11/18/2018 12:11 PM      Component Results     Component Value Ref Range & Units Status    WBC 8.0 4.0 - 11.0 10e9/L Final    RBC Count 2.58 (L) 3.8 - 5.2 10e12/L Final    Hemoglobin 8.5 (L) 11.7 - 15.7 g/dL Final    Hematocrit 25.8 (L) 35.0 - 47.0 % Final     78 - 100 fl Final    MCH 32.9 26.5 - 33.0 pg Final    MCHC 32.9 31.5 - 36.5 g/dL Final    RDW 12.6 10.0 - 15.0 % Final    Platelet Count 446 150 - 450 10e9/L Final    Diff Method Automated Method  Final    % Neutrophils 70.9 % Final    % Lymphocytes 19.8 % Final    % Monocytes 6.5 % Final    % Eosinophils 2.0 % Final    % Basophils 0.4 % Final    % Immature Granulocytes 0.4 % Final    Absolute Neutrophil 5.7 1.6 - 8.3 10e9/L Final    Absolute Lymphocytes 1.6 0.8 - 5.3 10e9/L Final    Absolute Monocytes 0.5 0.0 -  1.3 10e9/L Final    Absolute Eosinophils 0.2 0.0 - 0.7 10e9/L Final    Absolute Basophils 0.0 0.0 - 0.2 10e9/L Final    Abs Immature Granulocytes 0.0 0 - 0.4 10e9/L Final         11/18/2018 11:31 AM      Component Results     Component Value Ref Range & Units Status    Sodium 141 133 - 144 mmol/L Final    Potassium 4.5 3.4 - 5.3 mmol/L Final    Chloride 107 94 - 109 mmol/L Final    Carbon Dioxide 27 20 - 32 mmol/L Final    Anion Gap 7 3 - 14 mmol/L Final    Glucose 98 70 - 99 mg/dL Final    Urea Nitrogen 42 (H) 7 - 30 mg/dL Final    Creatinine 1.00 0.52 - 1.04 mg/dL Final    GFR Estimate 52 (L) >60 mL/min/1.7m2 Final    Non  GFR Calc    GFR Estimate If Black 63 >60 mL/min/1.7m2 Final    African American GFR Calc    Calcium 8.7 8.5 - 10.1 mg/dL Final         11/18/2018 11:34 AM      Component Results     Component Value Ref Range & Units Status    Troponin I ES <0.015 0.000 - 0.045 ug/L Final    The 99th percentile for upper reference range is 0.045 ug/L.  Troponin values   in the range of 0.045 - 0.120 ug/L may be associated with risks of adverse   clinical events.           11/18/2018 11:34 AM      Component Results     Component Value Ref Range & Units Status    N-Terminal Pro BNP Inpatient 1082 0 - 1800 pg/mL Final       Reference range shown and results flagged as abnormal are suggested inpatient   cut points for confirming diagnosis if CHF in an acute setting. Establishing a   baseline value for each individual patient is useful for follow-up. An   inpatient or emergency department NT-proPBNP <300 pg/mL effectively rules out   acute CHF, with 99% negative predictive value.  The outpatient non-acute reference range for ruling out CHF is:   0-125 pg/mL (age 18 to less than 75)   0-450 pg/mL (age 75 yrs and older)           11/18/2018 12:15 PM      Narrative     CHEST TWO VIEWS  11/18/2018 11:21 AM     HISTORY: Cough and dyspnea.    COMPARISON: 9/8/2018.         Impression     IMPRESSION: Patchy  lower lobe airspace opacities and bilateral pleural  effusions are again noted. Lungs appear hyperexpanded suggestive of  COPD. Cardiac silhouette is unchanged. There are severe degenerative  changes in the spine with multiple compression deformities. Severe  destructive changes at the shoulders appear similar.    ZAHEER CASTELAN MD         11/18/2018 11:30 AM      Component Results     Component Value Ref Range & Units Status    D Dimer 1.6 (H) 0.0 - 0.50 ug/ml FEU Final    This D-dimer assay is intended for use in conjunction with a clinical pretest   probability assessment model to exclude pulmonary embolism (PE) and deep   venous thrombosis (DVT) in outpatients suspected of PE or DVT. The cut-off   value is 0.5 ug/mL FEU.           11/18/2018  1:22 PM      Narrative     CT CHEST PULMONARY EMBOLISM WITH CONTRAST  11/18/2018 1:05 PM     HISTORY: Dyspnea and elevated D-dimer.    TECHNIQUE: Helical axial scans from lung apices through lung bases  with 49 mL Isovue-370 IV contrast. Radiation dose for this scan was  reduced using automated exposure control, adjustment of the mA and/or  kV according to patient size, or iterative reconstruction technique.    COMPARISON: None.    FINDINGS: There is no CT evidence for pulmonary embolism or other  acute vascular abnormality of the chest. Vascular calcifications,  including coronary artery calcifications. The mediastinal and hilar  structures are otherwise unremarkable. There are small amounts of  confluent infiltrate in the posterior lung bases bilaterally. The  remainder of the lungs are clear. Visualized upper abdomen shows no  abnormality. There is compression deformity of the L1 vertebral body  which has been present on prior chest x-rays dating back to at least  2009.        Impression     IMPRESSION:  1. No evidence for pulmonary embolism.  2. Vascular calcifications, including coronary artery calcifications.  3. Posterior bibasilar infiltrates.         11/18/2018   1:25 PM      Component Results     Component Value Ref Range & Units Status    Influenza A/B Agn Specimen Nose  Final    Influenza A Negative NEG^Negative Final    Influenza B Negative NEG^Negative Final    Test results must be correlated with clinical data. If necessary, results   should be confirmed by a molecular assay or viral culture.                  Clinical Quality Measure: Blood Pressure Screening     Your blood pressure was checked while you were in the emergency department today. The last reading we obtained was  BP: 159/80 . Please read the guidelines below about what these numbers mean and what you should do about them.  If your systolic blood pressure (the top number) is less than 120 and your diastolic blood pressure (the bottom number) is less than 80, then your blood pressure is normal. There is nothing more that you need to do about it.  If your systolic blood pressure (the top number) is 120-139 or your diastolic blood pressure (the bottom number) is 80-89, your blood pressure may be higher than it should be. You should have your blood pressure rechecked within a year by a primary care provider.  If your systolic blood pressure (the top number) is 140 or greater or your diastolic blood pressure (the bottom number) is 90 or greater, you may have high blood pressure. High blood pressure is treatable, but if left untreated over time it can put you at risk for heart attack, stroke, or kidney failure. You should have your blood pressure rechecked by a primary care provider within the next 4 weeks.  If your provider in the emergency department today gave you specific instructions to follow-up with your doctor or provider even sooner than that, you should follow that instruction and not wait for up to 4 weeks for your follow-up visit.        Thank you for choosing [facility]       Thank you for choosing [facility] for your care. Our goal is always to provide you with excellent care. Hearing back from our  patients is one way we can continue to improve our services. Please take a few minutes to complete the written survey that you may receive in the mail after you visit with us. Thank you!        [employer] Information     [employer] gives you secure access to your electronic health record. If you see a primary care provider, you can also send messages to your care team and make appointments. If you have questions, please call your primary care clinic.  If you do not have a primary care provider, please call [phone] and they will assist you.        Care EveryWhere ID     This is your Care EveryWhere ID. This could be used by other organizations to access your [facility] medical records  [id number]        Equal Access to Services     KASH FIGUEROA : Byron Loco, hoa lopez, ellis cantu, willis colbert. So [facility] [phone].    ATENCIÓN: Si habla español, tiene a chiu disposición servicios gratuitos de asistencia lingüística. Llame al [phone].    We comply with applicable federal civil rights laws and [place] laws. We do not discriminate on the basis of race, color, national origin, age, disability, sex, sexual orientation, or gender identity.            After Visit Summary       This is your record. Keep this with you and show to your community pharmacist(s) and doctor(s) at your next visit.

## 2018-11-18 NOTE — ED PROVIDER NOTES
History     Chief Complaint:  Cough    HPI   Kalli Brown is a 89 year old female who presents with cough for about 9-10 days. The patient reports that she hit her left flank against the grabber of her wheelchair. The patient reports that the cough has started becoming productive. She denies any chest pain. She report feeling shortness of breath for about the same duration of time as the cough. She denies any new swelling in ankles or legs. She denies a fever but expresses some concern that her symptoms might be indicative for an underlying pneumonia.     Allergies:  Atorvastatin     Medications:    Tylenol   Norvasc  Aspirin  Azithromycin                       Citracal      Tegretol  Iron   Lasix  Avalide  Xalatan  Mirabegron  Romeron  Miralax  Zoloft  Tramadol     Past Medical History:    Anemia   Deafness   Dislocated shoulder   Edema   Glaucoma   Hypertension   Multiple myeloma (H)   Proteinuria   Shoulder dislocation   Trigeminal neuralgia       Past Surgical History:    Eye Surgery   Gyn Surgery   Orthopedic Surgery   Phacoemulsification Clear Cornea With Standard Intraocular Lens Implant    Family History:    No family history on file.    Social History:  The patient  reports that she has never smoked. She has never used smokeless tobacco. She reports that she does not drink alcohol or use illicit drugs.   Marital Status:   [5]     Review of Systems   All other systems reviewed and are negative.    10 point review of systems performed and is negative except as above and in HPI.   Physical Exam     Vital signs  Patient Vitals for the past 24 hrs:   BP Temp Temp src Pulse Heart Rate Resp SpO2 Weight   11/18/18 1330 - - - 68 - 14 96 % -   11/18/18 1250 - - - - - - 97 % -   11/18/18 1220 - - - - - - 93 % -   11/18/18 1210 - - - - - - 99 % -   11/18/18 1032 159/80 98.4  F (36.9  C) Oral - 77 16 95 % 45.4 kg (100 lb)            Physical Exam  Constitutional: vitals reviewed  HENT: Moist oral  mucosa.   Eyes: Grossly normal vision. Pupils are equal and round. Extraocular movements intact.  Sclera clear and without icterus.  Cardiovascular: Normal rate. Regular rhythm. S1 and S2 without audible murmurs. 2+ radial pulses. Normal capillary refill.  Respiratory: Effort normal. Clear breath sounds bilaterally.  Gastrointestinal: Soft. No distension. No tenderness to palpation. No rebound or guarding.  Neurologic: Alert and awake. Coordinated movement of extremities. Speech normal.  Skin: Mild bilateral periorbital erythema  Musculoskeletal: No lower extremity edema. No gross deformity. No joint swelling. No rib tenderness or step offs. No hip tenderness.  Psychiatric: Appropriate affect. Behavior is normal. Intact recent and remote memory. Linear thought process.      Emergency Department Course   ECG (11:34:21):  Rate 61 bpm. DC interval 170. QRS duration 88. QT/QTc 466/469. P-R-T axes 69 59 77. Sinus rhythm with fusion complexes. Otherwise normal EKG. Interpreted at 1158 by Bear Rangel MD.     Imaging:  Chest CT, IV contrast only - PE protocol   Preliminary Result   IMPRESSION:   1. No evidence for pulmonary embolism.   2. Vascular calcifications, including coronary artery calcifications.   3. Posterior bibasilar infiltrates.      XR Chest 2 Views   Final Result   IMPRESSION: Patchy lower lobe airspace opacities and bilateral pleural   effusions are again noted. Lungs appear hyperexpanded suggestive of   COPD. Cardiac silhouette is unchanged. There are severe degenerative   changes in the spine with multiple compression deformities. Severe   destructive changes at the shoulders appear similar.      ZAHEER CASTELAN MD          I communicated the results of the imaging studies with the patient who expressed understanding of these findings.      Laboratory:  Influenza swab Negative  CBC: RBC 2.58, HGB 8.5, 'HCT 25.8, otherwise within normal limits   BMP: BUN 42, GFR Estimate 52, otherwise within normal limits    Troponin <0.015  Nt probnp 1082  D Dimer 1.6    Interventions:  1206: Duoneb 3mL Nebulization      Emergency Department Course:  Past medical records, nursing notes, and vitals reviewed.  1041: I performed an exam of the patient and obtained history, as documented above.      EKG was obtained, findings as reported above.     IV inserted and blood drawn for the above work up to be conducted.     The patient was sent for imaging studies while in the emergency department, findings above.       1336: Rechecked the patient, findings and plan explained to the patient. Patient discharged home, status improved, with instructions regarding supportive care, medications, and reasons to return as well as the importance of close follow-up was reviewed.    Impression & Plan    Medical Decision Making:  Patient presents with cough and subjective dyspnea over the last 1 week.  She also had a blunt trauma injury to her left lower anterior ribs sustained yesterday.  She has normal vitals and her exam shows no abnormal pulmonary findings and no signs of trauma.  There is no evidence of volume overload.  Laboratory workup is remarkable for elevated d-dimer.  Anemia is close to her baseline.  Her chest x-ray showed persistent bibasilar infiltrates as compared to prior.  CT pulmonary angiogram shows the same and no pulmonary embolus.  She does not require supplemental oxygen and is not tachypneic and has no signs of sepsis.  Given the increased cough and dyspnea over this time, it raises concern for potential community-acquired pneumonia as the cause of her mild infiltrates.  Given that she is well-appearing, this can be treated as an outpatient.  She will be prescribed azithromycin for 5 days.  No indication for treatment of COPD based on absence of wheezing or prolonged expiratory phase on exam.  The treatment plan was discussed with the patient and her sons.  She was instructed to follow-up closely with her regular doctor in a few  days for recheck.  Return precautions were discussed.  She left the emergency department in stable condition.      Diagnosis:    ICD-10-CM    1. Community acquired pneumonia, unspecified laterality J18.9        Disposition:  discharged to home    Yenny GREGORY am serving as a scribe at 10:41 AM on 11/18/2018 to document services personally performed by Bear Rangel MD based on my observations and the provider's statements to me.      EMERGENCY DEPARTMENT  1336:      Bear Rangel MD  11/18/18 [id number]

## 2018-11-18 NOTE — ED AVS SNAPSHOT
Emergency Department    [address] [place] [address]    Phone:  [phone]    Fax:  [phone]                                       Kalli Brown   MRN: [id number]    Department:   Emergency Department   Date of Visit:  11/18/2018           After Visit Summary Signature Page     I have received my discharge instructions, and my questions have been answered. I have discussed any challenges I see with this plan with the nurse or doctor.    ..........................................................................................................................................  Patient/Patient Representative Signature      ..........................................................................................................................................  Patient Representative Print Name and Relationship to Patient    ..................................................               ................................................  Date                                   Time    ..........................................................................................................................................  Reviewed by Signature/Title    ...................................................              ..............................................  Date                                               Time          22EPIC Rev 08/18

## 2018-11-18 NOTE — DISCHARGE INSTRUCTIONS

## 2018-11-20 ENCOUNTER — TELEPHONE (OUTPATIENT)
Dept: FAMILY MEDICINE | Facility: CLINIC | Age: 83
End: 2018-11-20

## 2018-11-20 DIAGNOSIS — R41.89 COGNITIVE DECLINE: Primary | ICD-10-CM

## 2018-11-20 NOTE — TELEPHONE ENCOUNTER
Reason for Call:  Other questions    Detailed comments: Son Fuad Kevin calling.  There is a Prescribed med for urinary incontinence, which is not covered by insurance  and not helping.  Please cancel.      Per son Fuad, [facility] is  asking if Dr Stovall would be willing to write a letter stating the patient was not competent to make medical decisions any longer. Please advise.     Phone Number Patient can be reached at: Other phone number:  [phone]  Fuad    Best Time: any    Can we leave a detailed message on this number? YES    Call taken on 11/20/2018 at 1:39 PM by Ivonne Collazo

## 2018-11-20 NOTE — TELEPHONE ENCOUNTER
TO PCP     Called Fuad (on C2C) - M asking him to call back to discuss below    1) noted myrbetriq was started at 11/7/18 OV for urinary incontinence - pt's son reporting this is not effective and not covered under insurance - please advise if okay to discontinue med? (if okay to discontinue will need to fax a copy of discontinuation order to assisted living facility)     2) Also - please advise; he is requesting a letter that patient is not competent to make medical decisions     Per prior encounter, pt resides at [facility]

## 2018-11-21 NOTE — TELEPHONE ENCOUNTER
Called Fuad - wife (not on C2C) answered. He is not available at this time. She will ask him to call our clinic back     [facility] - Fax number: #[phone]    Faxed over copy of discontinuation order     Cintia LABOY RN

## 2018-11-26 ENCOUNTER — OFFICE VISIT (OUTPATIENT)
Dept: FAMILY MEDICINE | Facility: CLINIC | Age: 83
End: 2018-11-26
Payer: COMMERCIAL

## 2018-11-26 VITALS
BODY MASS INDEX: 16.64 KG/M2 | SYSTOLIC BLOOD PRESSURE: 153 MMHG | DIASTOLIC BLOOD PRESSURE: 67 MMHG | OXYGEN SATURATION: 96 % | TEMPERATURE: 99 F | HEART RATE: 68 BPM | HEIGHT: 65 IN

## 2018-11-26 DIAGNOSIS — Z23 NEED FOR PNEUMOCOCCAL VACCINATION: ICD-10-CM

## 2018-11-26 DIAGNOSIS — J18.9 COMMUNITY ACQUIRED PNEUMONIA, UNSPECIFIED LATERALITY: Primary | ICD-10-CM

## 2018-11-26 PROCEDURE — 90732 PPSV23 VACC 2 YRS+ SUBQ/IM: CPT | Performed by: INTERNAL MEDICINE

## 2018-11-26 PROCEDURE — 99214 OFFICE O/P EST MOD 30 MIN: CPT | Mod: 25 | Performed by: INTERNAL MEDICINE

## 2018-11-26 PROCEDURE — G0009 ADMIN PNEUMOCOCCAL VACCINE: HCPCS | Performed by: INTERNAL MEDICINE

## 2018-11-26 NOTE — PATIENT INSTRUCTIONS
(J18.9) Community acquired pneumonia, unspecified laterality  (primary encounter diagnosis)  Comment: You are doing very well with regards to the recent commnuity acquired pneumonia and we will continue to focus on prevention.  I would recommend some form of standing and trying to take deep breaths - using an incentive spirometer could be helpful  Plan: order for DME            (Z23) Need for pneumococcal vaccination  Comment:   Plan: Pneumococcal vaccine 23 valent PPSV23          (Pneumovax) [13925]

## 2018-11-26 NOTE — NURSING NOTE
Screening Questionnaire for Adult Immunization     Are you sick today?   No    Do you have allergies to medications, food or any vaccine?   Yes    Have you ever had a serious reaction after receiving a vaccination?   No    Do you have a long-term health problem with heart disease, lung disease,  asthma, kidney disease, diabetes, anemia, metabolic or blood disease?   Yes    Do you have cancer, leukemia, AIDS, or any immune system problem?   No    Do you take cortisone, prednisone, other steroids, or anticancer drugs, or  have you had any x-ray (radiation) treatments?   No    Have you had a seizure, brain, or other nervous system problem?   No    During the past year, have you received a transfusion of blood or blood       products, or been given a medicine called immune (gamma) globulin?   No    For women: Are you pregnant or is there a chance you could become         pregnant during the next month?   No    Have you received any vaccinations in the past 4 weeks?   No     Immunization questionnaire answers were not all negative.  Notified no one     MNVFC doesn't apply on this patient     Screening performed by Babs Hall on 11/26/2018 at 4:34 PM.

## 2018-11-26 NOTE — NURSING NOTE
"Chief Complaint   Patient presents with     E/R Follow up        Initial /67 (BP Location: Right arm, Patient Position: Chair, Cuff Size: Adult Regular)  Pulse 68  Temp 99  F (37.2  C) (Tympanic)  Ht 5' 5\" (1.651 m)  SpO2 96%  BMI 16.64 kg/m2 Estimated body mass index is 16.64 kg/(m^2) as calculated from the following:    Height as of this encounter: 5' 5\" (1.651 m).    Weight as of 11/18/18: 100 lb (45.4 kg)..    BP completed using cuff size: regular  MEDICATIONS REVIEWED  SOCIAL AND FAMILY HX REVIEWED  Vashti Cassidy CMA  "

## 2018-11-26 NOTE — MR AVS SNAPSHOT
After Visit Summary   11/26/2018    Kalli Brown    MRN: [id number]           Patient Information     Date Of Birth          2/26/1929        Visit Information        Provider Department      11/26/2018 3:30 PM Man Stovall MD [facility]        Today's Diagnoses     Community acquired pneumonia, unspecified laterality    -  1    Need for pneumococcal vaccination          Care Instructions    (J18.9) Community acquired pneumonia, unspecified laterality  (primary encounter diagnosis)  Comment: You are doing very well with regards to the recent commnuity acquired pneumonia and we will continue to focus on prevention.  I would recommend some form of standing and trying to take deep breaths - using an incentive spirometer could be helpful  Plan: order for DME            (Z23) Need for pneumococcal vaccination  Comment:   Plan: Pneumococcal vaccine 23 valent PPSV23          (Pneumovax) [48194]                     Follow-ups after your visit        Follow-up notes from your care team     Return in about 3 months (around 2/26/2019) for Routine Visit.      Who to contact     If you have questions or need follow up information about today's clinic visit or your schedule please contact [facility] directly at [phone].  Normal or non-critical lab and imaging results will be communicated to you by MyChart, letter or phone within 4 business days after the clinic has received the results. If you do not hear from us within 7 days, please contact the clinic through MyChart or phone. If you have a critical or abnormal lab result, we will notify you by phone as soon as possible.  Submit refill requests through [employer] or call your pharmacy and they will forward the refill request to us. Please allow 3 business days for your refill to be completed.          Additional Information About Your Visit        MyChart Information     [employer] gives you secure access to your  "electronic health record. If you see a primary care provider, you can also send messages to your care team and make appointments. If you have questions, please call your primary care clinic.  If you do not have a primary care provider, please call [phone] and they will assist you.        Care EveryWhere ID     This is your Care EveryWhere ID. This could be used by other organizations to access your [facility] medical records  [id number]        Your Vitals Were     Pulse Temperature Height Pulse Oximetry BMI (Body Mass Index)       68 99  F (37.2  C) (Tympanic) 5' 5\" (1.651 m) 96% 16.64 kg/m2        Blood Pressure from Last 3 Encounters:   11/26/18 153/67   11/18/18 159/80   11/07/18 153/66    Weight from Last 3 Encounters:   11/18/18 100 lb (45.4 kg)   11/07/18 105 lb (47.6 kg)   07/22/18 105 lb (47.6 kg)              We Performed the Following     Pneumococcal vaccine 23 valent PPSV23  (Pneumovax) [[id number]]          Today's Medication Changes          These changes are accurate as of 11/26/18  4:22 PM.  If you have any questions, ask your nurse or doctor.               Start taking these medicines.        Dose/Directions    order for DME   Used for:  Community acquired pneumonia, unspecified laterality   Started by:  Man Stovall MD        Equipment being ordered: Incentive Spirometer   Quantity:  1 each   Refills:  0            Where to get your medicines      Some of these will need a paper prescription and others can be bought over the counter.  Ask your nurse if you have questions.     Bring a paper prescription for each of these medications     order for DME                Primary Care Provider Office Phone # Fax #    Man Stovall [phone] [address]  [place] [address]        Equal Access to Services     KASH FIGUEROA AH: Hadii juve Loco, wabobyda john, qaybta kaalneida cantu, willis georges ah. So wac " [phone].    ATENCIÓN: Si louise torres, tiene a chiu disposición servicios gratuitos de asistencia lingüística. Meng cooper [phone].    We comply with applicable federal civil rights laws and [place] laws. We do not discriminate on the basis of race, color, national origin, age, disability, sex, sexual orientation, or gender identity.            Thank you!     Thank you for choosing [facility]  for your care. Our goal is always to provide you with excellent care. Hearing back from our patients is one way we can continue to improve our services. Please take a few minutes to complete the written survey that you may receive in the mail after your visit with us. Thank you!             Your Updated Medication List - Protect others around you: Learn how to safely use, store and throw away your medicines at www.disposemymeds.org.          This list is accurate as of 11/26/18  4:22 PM.  Always use your most recent med list.                   Brand Name Dispense Instructions for use Diagnosis    acetaminophen 500 MG tablet    TYLENOL    540 tablet    Take 2 tablets (1,000 mg) by mouth 3 times daily    Chronic low back pain, unspecified back pain laterality, with sciatica presence unspecified       amLODIPine 10 MG tablet    NORVASC    30 tablet    Take 1 tablet (10 mg) by mouth daily    Benign essential hypertension       aspirin 81 MG chewable tablet    ASPIRIN LOW DOSE    30 tablet    1 TAB BY MOUTH DAILY (DX:ANTICOAGULATION)    Benign essential hypertension       calcium citrate-vitamin D 315-250 MG-UNIT Tabs per tablet    CITRACAL    180 tablet    Take 1 tablet by mouth 2 times daily -Take with food    Osteoporosis, unspecified osteoporosis type, unspecified pathological fracture presence       carBAMazepine 200 MG tablet    TEGretol    75 tablet    Take 200 mg in the AM and 300 mg in the PM.    Trigeminal neuralgia       docusate sodium 250 MG capsule    COLACE    90 capsule    Take 1 capsule (250 mg)  by mouth daily as needed for constipation    Constipation, unspecified constipation type       ferrous sulfate 325 (65 Fe) MG tablet    IRON    60 tablet    Take 1 tablet (325 mg) by mouth 2 times daily (with meals)    Iron deficiency anemia, unspecified iron deficiency anemia type       furosemide 80 MG tablet    LASIX    90 tablet    Take 1 tablet (80 mg) by mouth daily    Benign essential hypertension       HALLS COUGH DROPS 7 MG Lozg   Generic drug:  menthol     84 lozenge    Take 1 lozenge (7 mg) by mouth every hour as needed for cough        irbesartan-hydrochlorothiazide 300-12.5 MG tablet    AVALIDE    30 tablet    Take 1 tablet by mouth daily    CKD (chronic kidney disease) stage 3, GFR 30-59 ml/min (H)       latanoprost 0.005 % ophthalmic solution    XALATAN     Place 1 drop into both eyes At Bedtime        mirtazapine 7.5 MG tablet    REMERON    90 tablet    Take 1 tablet (7.5 mg) by mouth At Bedtime    Primary insomnia       order for DME     1 each    Equipment being ordered: Incentive Spirometer    Community acquired pneumonia, unspecified laterality       polyethylene glycol powder    MIRALAX    510 g    Take 17 g by mouth daily as needed    Constipation, unspecified constipation type       sertraline 50 MG tablet    ZOLOFT    90 tablet    Take 1 tablet (50 mg) by mouth daily    DAVID (generalized anxiety disorder)       traMADol 50 MG tablet    ULTRAM    60 tablet    Take 1 tablet (50 mg) by mouth every 6 hours as needed for severe pain    Nontraumatic shoulder pain, right, Other chronic pain       Vitamin D (Cholecalciferol) 1000 units Tabs     30 tablet    Take 1,000 Units by mouth daily    Vitamin D deficiency

## 2018-11-26 NOTE — PROGRESS NOTES
"  SUBJECTIVE:   Kalli Brown is a 89 year old female who presents to clinic today for the following health issues:      ED/UC Followup:    Facility:  [facility]  Date of visit: 11/18/18  Reason for visit: Pneumonia  Current Status: at home         [facility]  CLINIC PROGRESS NOTE    Subjective:  Pneumonia   Kalli Brown was recently treated with azithromycin for suspsected antibiotics.  She has not had any issues with dysphagia.  She does have occasoinal difficulgty swallowing pills.  She does feel better today with regards to her cough.  No fevers.  She is not active and is wheelchair bound typically.       Past medical history, medications, allergies, social history, family history reviewed and updated in [facility] as of 11/26/2018 .    ROS  CONSTITUTIONAL: worsening fatigue, no unexpected change in weight  SKIN: no worrisome rashes, no worrisome moles, no worrisome lesions  EYES: no acute vision problems or changes  ENT: no ear problems, no mouth problems, no throat problems  RESP: as above   CV: no chest pain, no palpitations, no new or worsening peripheral edema  GI: no nausea, no vomiting, no constipation, no diarrhea  : no frequency, no dysuria, no hematuria  MS: Kyphotic spine, wheel chair bound  PSYCHIATRIC: still having difficulty with anxiety and outbursts    Objective:  Vitals  /67 (BP Location: Right arm, Patient Position: Chair, Cuff Size: Adult Regular)  Pulse 68  Temp 99  F (37.2  C) (Tympanic)  Ht 5' 5\" (1.651 m)  SpO2 96%  BMI 16.64 kg/m2  GEN: Alert Oriented x3 NAD  HEENT: Atraumatic, normocephalic, neck supple   CV: RRR no murmurs or rubs  PULM: CTA no wheezes or crackles - diminished inspiration   ABD: Soft, nontender nondistended, no hepatosplenomegally  SKIN: No visible skin lesion or ulcerations  EXT:   no edema bilateral lower extremities  NEURO: Gait and station deferred - wheelchari, No focal neurologic deficits  PSYCH: Mood good, affect mood " congruent    No images are attached to the encounter.    No results found for this or any previous visit (from the past 24 hour(s)).    Assessment/Plan:  Patient Instructions   (J18.9) Community acquired pneumonia, unspecified laterality  (primary encounter diagnosis)  Comment: You are doing very well with regards to the recent commnuity acquired pneumonia and we will continue to focus on prevention.  I would recommend some form of standing and trying to take deep breaths - using an incentive spirometer could be helpful  Plan: order for DME            (Z23) Need for pneumococcal vaccination  Comment:   Plan: Pneumococcal vaccine 23 valent PPSV23          (Pneumovax) [21131]               Follow up in 3 months    Disclaimer: This note consists of symbols derived from keyboarding, dictation and/or voice recognition software. As a result, there may be errors in the script that have gone undetected. Please consider this when interpreting information found in this chart.    Man Stovall MD  [phone]

## 2019-01-01 ENCOUNTER — DOCUMENTATION ONLY (OUTPATIENT)
Dept: OTHER | Facility: CLINIC | Age: 84
End: 2019-01-01

## 2019-01-01 ENCOUNTER — PATIENT OUTREACH (OUTPATIENT)
Dept: CARE COORDINATION | Facility: CLINIC | Age: 84
End: 2019-01-01

## 2019-01-01 ENCOUNTER — APPOINTMENT (OUTPATIENT)
Dept: CT IMAGING | Facility: CLINIC | Age: 84
End: 2019-01-01
Attending: PHYSICIAN ASSISTANT
Payer: COMMERCIAL

## 2019-01-01 ENCOUNTER — TELEPHONE (OUTPATIENT)
Dept: FAMILY MEDICINE | Facility: CLINIC | Age: 84
End: 2019-01-01

## 2019-01-01 ENCOUNTER — APPOINTMENT (OUTPATIENT)
Dept: GENERAL RADIOLOGY | Facility: CLINIC | Age: 84
End: 2019-01-01
Attending: EMERGENCY MEDICINE
Payer: COMMERCIAL

## 2019-01-01 ENCOUNTER — APPOINTMENT (OUTPATIENT)
Dept: PHYSICAL THERAPY | Facility: CLINIC | Age: 84
DRG: 605 | End: 2019-01-01
Attending: INTERNAL MEDICINE
Payer: COMMERCIAL

## 2019-01-01 ENCOUNTER — HOSPITAL ENCOUNTER (OUTPATIENT)
Facility: CLINIC | Age: 84
Setting detail: OBSERVATION
Discharge: HOME | End: 2019-10-06
Attending: EMERGENCY MEDICINE | Admitting: INTERNAL MEDICINE
Payer: COMMERCIAL

## 2019-01-01 ENCOUNTER — HEALTH MAINTENANCE LETTER (OUTPATIENT)
Age: 84
End: 2019-01-01

## 2019-01-01 ENCOUNTER — APPOINTMENT (OUTPATIENT)
Dept: MRI IMAGING | Facility: CLINIC | Age: 84
DRG: 605 | End: 2019-01-01
Attending: INTERNAL MEDICINE
Payer: COMMERCIAL

## 2019-01-01 ENCOUNTER — MEDICAL CORRESPONDENCE (OUTPATIENT)
Dept: HEALTH INFORMATION MANAGEMENT | Facility: CLINIC | Age: 84
End: 2019-01-01

## 2019-01-01 ENCOUNTER — APPOINTMENT (OUTPATIENT)
Dept: GENERAL RADIOLOGY | Facility: CLINIC | Age: 84
End: 2019-01-01
Attending: PHYSICIAN ASSISTANT
Payer: COMMERCIAL

## 2019-01-01 ENCOUNTER — APPOINTMENT (OUTPATIENT)
Dept: GENERAL RADIOLOGY | Facility: CLINIC | Age: 84
DRG: 605 | End: 2019-01-01
Attending: EMERGENCY MEDICINE
Payer: COMMERCIAL

## 2019-01-01 ENCOUNTER — MYC MEDICAL ADVICE (OUTPATIENT)
Dept: FAMILY MEDICINE | Facility: CLINIC | Age: 84
End: 2019-01-01

## 2019-01-01 ENCOUNTER — APPOINTMENT (OUTPATIENT)
Dept: OCCUPATIONAL THERAPY | Facility: CLINIC | Age: 84
DRG: 605 | End: 2019-01-01
Attending: INTERNAL MEDICINE
Payer: COMMERCIAL

## 2019-01-01 ENCOUNTER — OFFICE VISIT (OUTPATIENT)
Dept: FAMILY MEDICINE | Facility: CLINIC | Age: 84
End: 2019-01-01
Payer: COMMERCIAL

## 2019-01-01 ENCOUNTER — APPOINTMENT (OUTPATIENT)
Dept: PHYSICAL THERAPY | Facility: CLINIC | Age: 84
DRG: 605 | End: 2019-01-01
Payer: COMMERCIAL

## 2019-01-01 ENCOUNTER — APPOINTMENT (OUTPATIENT)
Dept: PHYSICAL THERAPY | Facility: CLINIC | Age: 84
End: 2019-01-01
Attending: INTERNAL MEDICINE
Payer: COMMERCIAL

## 2019-01-01 ENCOUNTER — HOSPITAL ENCOUNTER (INPATIENT)
Facility: CLINIC | Age: 84
LOS: 7 days | Discharge: HOSPICE | DRG: 605 | End: 2019-11-01
Attending: EMERGENCY MEDICINE | Admitting: INTERNAL MEDICINE
Payer: COMMERCIAL

## 2019-01-01 ENCOUNTER — HOSPITAL ENCOUNTER (EMERGENCY)
Facility: CLINIC | Age: 84
Discharge: HOME | End: 2019-10-07
Attending: EMERGENCY MEDICINE | Admitting: EMERGENCY MEDICINE
Payer: COMMERCIAL

## 2019-01-01 VITALS — SYSTOLIC BLOOD PRESSURE: 150 MMHG | HEART RATE: 55 BPM | DIASTOLIC BLOOD PRESSURE: 80 MMHG | OXYGEN SATURATION: 96 %

## 2019-01-01 VITALS
WEIGHT: 103 LBS | SYSTOLIC BLOOD PRESSURE: 177 MMHG | OXYGEN SATURATION: 95 % | RESPIRATION RATE: 16 BRPM | DIASTOLIC BLOOD PRESSURE: 114 MMHG | BODY MASS INDEX: 17.16 KG/M2 | HEIGHT: 65 IN | HEART RATE: 66 BPM | TEMPERATURE: 97.7 F

## 2019-01-01 VITALS
RESPIRATION RATE: 16 BRPM | DIASTOLIC BLOOD PRESSURE: 75 MMHG | BODY MASS INDEX: 16.17 KG/M2 | HEIGHT: 67 IN | HEART RATE: 80 BPM | SYSTOLIC BLOOD PRESSURE: 167 MMHG | TEMPERATURE: 97.7 F | WEIGHT: 103 LBS | OXYGEN SATURATION: 91 %

## 2019-01-01 VITALS
BODY MASS INDEX: 15.87 KG/M2 | RESPIRATION RATE: 16 BRPM | SYSTOLIC BLOOD PRESSURE: 150 MMHG | TEMPERATURE: 98.5 F | HEIGHT: 65 IN | HEART RATE: 63 BPM | DIASTOLIC BLOOD PRESSURE: 82 MMHG | WEIGHT: 95.24 LBS | OXYGEN SATURATION: 93 %

## 2019-01-01 VITALS
OXYGEN SATURATION: 95 % | DIASTOLIC BLOOD PRESSURE: 78 MMHG | SYSTOLIC BLOOD PRESSURE: 148 MMHG | HEART RATE: 72 BPM | TEMPERATURE: 98.1 F

## 2019-01-01 DIAGNOSIS — I10 BENIGN ESSENTIAL HYPERTENSION: ICD-10-CM

## 2019-01-01 DIAGNOSIS — N18.30 CKD (CHRONIC KIDNEY DISEASE) STAGE 3, GFR 30-59 ML/MIN (H): ICD-10-CM

## 2019-01-01 DIAGNOSIS — F51.01 PRIMARY INSOMNIA: ICD-10-CM

## 2019-01-01 DIAGNOSIS — H26.9 CATARACT, UNSPECIFIED CATARACT TYPE, UNSPECIFIED LATERALITY: ICD-10-CM

## 2019-01-01 DIAGNOSIS — G89.29 CHRONIC LOW BACK PAIN: ICD-10-CM

## 2019-01-01 DIAGNOSIS — N18.30 ANEMIA OF CHRONIC KIDNEY FAILURE, STAGE 3 (MODERATE) (H): Primary | ICD-10-CM

## 2019-01-01 DIAGNOSIS — D63.1 ANEMIA OF CHRONIC KIDNEY FAILURE, STAGE 3 (MODERATE) (H): Primary | ICD-10-CM

## 2019-01-01 DIAGNOSIS — F41.1 GAD (GENERALIZED ANXIETY DISORDER): ICD-10-CM

## 2019-01-01 DIAGNOSIS — R62.7 FAILURE TO THRIVE IN ADULT: Primary | ICD-10-CM

## 2019-01-01 DIAGNOSIS — Z23 NEED FOR VACCINATION FOR MEASLES: ICD-10-CM

## 2019-01-01 DIAGNOSIS — F41.1 GAD (GENERALIZED ANXIETY DISORDER): Primary | ICD-10-CM

## 2019-01-01 DIAGNOSIS — H26.9 CATARACT: Primary | ICD-10-CM

## 2019-01-01 DIAGNOSIS — M54.9 BACK PAIN: ICD-10-CM

## 2019-01-01 DIAGNOSIS — G50.0 TRIGEMINAL NEURALGIA: ICD-10-CM

## 2019-01-01 DIAGNOSIS — E86.0 DEHYDRATION: ICD-10-CM

## 2019-01-01 DIAGNOSIS — L98.499 SKIN ULCER, UNSPECIFIED ULCER STAGE (H): ICD-10-CM

## 2019-01-01 DIAGNOSIS — D64.9 ANEMIA, UNSPECIFIED TYPE: ICD-10-CM
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
## 2019-01-01 DIAGNOSIS — M54.50 ACUTE MIDLINE LOW BACK PAIN WITHOUT SCIATICA: ICD-10-CM

## 2019-01-01 DIAGNOSIS — D64.9 ANEMIA: ICD-10-CM

## 2019-01-01 DIAGNOSIS — R74.01 TRANSAMINITIS: ICD-10-CM

## 2019-01-01 DIAGNOSIS — M81.0 OSTEOPOROSIS, UNSPECIFIED OSTEOPOROSIS TYPE, UNSPECIFIED PATHOLOGICAL FRACTURE PRESENCE: ICD-10-CM

## 2019-01-01 DIAGNOSIS — E55.9 VITAMIN D DEFICIENCY: ICD-10-CM

## 2019-01-01 DIAGNOSIS — R46.89 BEHAVIORAL CHANGE: ICD-10-CM
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
## 2019-01-01 DIAGNOSIS — N18.9 CHRONIC RENAL INSUFFICIENCY: ICD-10-CM

## 2019-01-01 DIAGNOSIS — F03.91 DEMENTIA WITH BEHAVIORAL DISTURBANCE, UNSPECIFIED DEMENTIA TYPE: ICD-10-CM

## 2019-01-01 DIAGNOSIS — S81.801A OPEN WOUND OF RIGHT LOWER LEG, INITIAL ENCOUNTER: Primary | ICD-10-CM

## 2019-01-01 DIAGNOSIS — M54.50 CHRONIC LOW BACK PAIN: ICD-10-CM

## 2019-01-01 DIAGNOSIS — N18.30 CKD (CHRONIC KIDNEY DISEASE) STAGE 3, GFR 30-59 ML/MIN (H): Primary | ICD-10-CM

## 2019-01-01 DIAGNOSIS — M86.9 OSTEOMYELITIS, UNSPECIFIED SITE, UNSPECIFIED TYPE (H): ICD-10-CM

## 2019-01-01 DIAGNOSIS — Z51.5 HOSPICE CARE PATIENT: ICD-10-CM

## 2019-01-01 DIAGNOSIS — S32.011A CLOSED STABLE BURST FRACTURE OF FIRST LUMBAR VERTEBRA, INITIAL ENCOUNTER (H): ICD-10-CM

## 2019-01-01 DIAGNOSIS — Z23 NEED FOR VACCINATION: ICD-10-CM

## 2019-01-01 DIAGNOSIS — R79.89 PRERENAL AZOTEMIA: ICD-10-CM

## 2019-01-01 DIAGNOSIS — D50.9 IRON DEFICIENCY ANEMIA, UNSPECIFIED IRON DEFICIENCY ANEMIA TYPE: ICD-10-CM

## 2019-01-01 LAB
ALBUMIN SERPL-MCNC: 3.2 G/DL (ref 3.4–5)
ALBUMIN UR-MCNC: 100 MG/DL
ALP SERPL-CCNC: 137 U/L (ref 40–150)
ALT SERPL W P-5'-P-CCNC: 58 U/L (ref 0–50)
ANION GAP SERPL CALCULATED.3IONS-SCNC: 1 MMOL/L (ref 3–14)
ANION GAP SERPL CALCULATED.3IONS-SCNC: 4 MMOL/L (ref 3–14)
ANION GAP SERPL CALCULATED.3IONS-SCNC: 6 MMOL/L (ref 3–14)
ANION GAP SERPL CALCULATED.3IONS-SCNC: 7 MMOL/L (ref 3–14)
ANION GAP SERPL CALCULATED.3IONS-SCNC: 8 MMOL/L (ref 3–14)
APPEARANCE UR: CLEAR
AST SERPL W P-5'-P-CCNC: 50 U/L (ref 0–45)
BACTERIA SPEC CULT: NO GROWTH
BACTERIA SPEC CULT: NO GROWTH
BASOPHILS # BLD AUTO: 0 10E9/L (ref 0–0.2)
BASOPHILS # BLD AUTO: 0 10E9/L (ref 0–0.2)
BASOPHILS NFR BLD AUTO: 0.2 %
BASOPHILS NFR BLD AUTO: 0.3 %
BILIRUB DIRECT SERPL-MCNC: <0.1 MG/DL (ref 0–0.2)
BILIRUB SERPL-MCNC: 0.2 MG/DL (ref 0.2–1.3)
BILIRUB UR QL STRIP: NEGATIVE
BUN SERPL-MCNC: 40 MG/DL (ref 7–30)
BUN SERPL-MCNC: 48 MG/DL (ref 7–30)
BUN SERPL-MCNC: 48 MG/DL (ref 7–30)
BUN SERPL-MCNC: 49 MG/DL (ref 7–30)
BUN SERPL-MCNC: 54 MG/DL (ref 7–30)
CALCIUM SERPL-MCNC: 8 MG/DL (ref 8.5–10.1)
CALCIUM SERPL-MCNC: 8 MG/DL (ref 8.5–10.1)
CALCIUM SERPL-MCNC: 8.4 MG/DL (ref 8.5–10.1)
CALCIUM SERPL-MCNC: 8.6 MG/DL (ref 8.5–10.1)
CALCIUM SERPL-MCNC: 9 MG/DL (ref 8.5–10.1)
CHLORIDE SERPL-SCNC: 103 MMOL/L (ref 94–109)
CHLORIDE SERPL-SCNC: 105 MMOL/L (ref 94–109)
CHLORIDE SERPL-SCNC: 109 MMOL/L (ref 94–109)
CHLORIDE SERPL-SCNC: 110 MMOL/L (ref 94–109)
CHLORIDE SERPL-SCNC: 113 MMOL/L (ref 94–109)
CO2 SERPL-SCNC: 26 MMOL/L (ref 20–32)
CO2 SERPL-SCNC: 27 MMOL/L (ref 20–32)
CO2 SERPL-SCNC: 28 MMOL/L (ref 20–32)
COLOR UR AUTO: YELLOW
CREAT SERPL-MCNC: 1.01 MG/DL (ref 0.52–1.04)
CREAT SERPL-MCNC: 1.06 MG/DL (ref 0.52–1.04)
CREAT SERPL-MCNC: 1.08 MG/DL (ref 0.52–1.04)
CREAT SERPL-MCNC: 1.13 MG/DL (ref 0.52–1.04)
CREAT SERPL-MCNC: 1.17 MG/DL (ref 0.52–1.04)
CREAT SERPL-MCNC: 1.18 MG/DL (ref 0.52–1.04)
CRP SERPL-MCNC: 59.4 MG/L (ref 0–8)
DIFFERENTIAL METHOD BLD: ABNORMAL
DIFFERENTIAL METHOD BLD: ABNORMAL
EOSINOPHIL # BLD AUTO: 0.2 10E9/L (ref 0–0.7)
EOSINOPHIL # BLD AUTO: 0.2 10E9/L (ref 0–0.7)
EOSINOPHIL NFR BLD AUTO: 2.6 %
EOSINOPHIL NFR BLD AUTO: 2.8 %
ERYTHROCYTE [DISTWIDTH] IN BLOOD BY AUTOMATED COUNT: 12.2 % (ref 10–15)
ERYTHROCYTE [DISTWIDTH] IN BLOOD BY AUTOMATED COUNT: 12.7 % (ref 10–15)
ERYTHROCYTE [DISTWIDTH] IN BLOOD BY AUTOMATED COUNT: 12.9 % (ref 10–15)
ERYTHROCYTE [DISTWIDTH] IN BLOOD BY AUTOMATED COUNT: 13.1 % (ref 10–15)
ERYTHROCYTE [DISTWIDTH] IN BLOOD BY AUTOMATED COUNT: 13.1 % (ref 10–15)
ERYTHROCYTE [DISTWIDTH] IN BLOOD BY AUTOMATED COUNT: 13.9 % (ref 10–15)
ERYTHROCYTE [DISTWIDTH] IN BLOOD BY AUTOMATED COUNT: 14 % (ref 10–15)
ERYTHROCYTE [DISTWIDTH] IN BLOOD BY AUTOMATED COUNT: 14.1 % (ref 10–15)
ERYTHROCYTE [SEDIMENTATION RATE] IN BLOOD BY WESTERGREN METHOD: 116 MM/H (ref 0–30)
GFR SERPL CREATININE-BSD FRML MDRD: 40 ML/MIN/{1.73_M2}
GFR SERPL CREATININE-BSD FRML MDRD: 41 ML/MIN/{1.73_M2}
GFR SERPL CREATININE-BSD FRML MDRD: 43 ML/MIN/{1.73_M2}
GFR SERPL CREATININE-BSD FRML MDRD: 45 ML/MIN/{1.73_M2}
GFR SERPL CREATININE-BSD FRML MDRD: 46 ML/MIN/{1.73_M2}
GFR SERPL CREATININE-BSD FRML MDRD: 49 ML/MIN/{1.73_M2}
GLUCOSE SERPL-MCNC: 132 MG/DL (ref 70–99)
GLUCOSE SERPL-MCNC: 95 MG/DL (ref 70–99)
GLUCOSE SERPL-MCNC: 96 MG/DL (ref 70–99)
GLUCOSE UR STRIP-MCNC: NEGATIVE MG/DL
HCT VFR BLD AUTO: 22.5 % (ref 35–47)
HCT VFR BLD AUTO: 22.6 % (ref 35–47)
HCT VFR BLD AUTO: 23 % (ref 35–47)
HCT VFR BLD AUTO: 23.1 % (ref 35–47)
HCT VFR BLD AUTO: 23.6 % (ref 35–47)
HCT VFR BLD AUTO: 24.5 % (ref 35–47)
HCT VFR BLD AUTO: 26.5 % (ref 35–47)
HCT VFR BLD AUTO: 28.1 % (ref 35–47)
HGB BLD-MCNC: 7.5 G/DL (ref 11.7–15.7)
HGB BLD-MCNC: 7.6 G/DL (ref 11.7–15.7)
HGB BLD-MCNC: 8.2 G/DL (ref 11.7–15.7)
HGB BLD-MCNC: 8.6 G/DL (ref 11.7–15.7)
HGB BLD-MCNC: 9 G/DL (ref 11.7–15.7)
HGB BLD-MCNC: 9.4 G/DL (ref 11.7–15.7)
HGB UR QL STRIP: ABNORMAL
HYALINE CASTS #/AREA URNS LPF: 4 /LPF (ref 0–2)
IMM GRANULOCYTES # BLD: 0 10E9/L (ref 0–0.4)
IMM GRANULOCYTES # BLD: 0 10E9/L (ref 0–0.4)
IMM GRANULOCYTES NFR BLD: 0.2 %
IMM GRANULOCYTES NFR BLD: 0.3 %
KETONES UR STRIP-MCNC: NEGATIVE MG/DL
LEUKOCYTE ESTERASE UR QL STRIP: NEGATIVE
LYMPHOCYTES # BLD AUTO: 1.1 10E9/L (ref 0.8–5.3)
LYMPHOCYTES # BLD AUTO: 1.5 10E9/L (ref 0.8–5.3)
LYMPHOCYTES NFR BLD AUTO: 13.9 %
LYMPHOCYTES NFR BLD AUTO: 18.6 %
Lab: NORMAL
Lab: NORMAL
MCH RBC QN AUTO: 33 PG (ref 26.5–33)
MCH RBC QN AUTO: 33.2 PG (ref 26.5–33)
MCH RBC QN AUTO: 33.3 PG (ref 26.5–33)
MCH RBC QN AUTO: 33.9 PG (ref 26.5–33)
MCH RBC QN AUTO: 34.1 PG (ref 26.5–33)
MCH RBC QN AUTO: 35.4 PG (ref 26.5–33)
MCH RBC QN AUTO: 35.8 PG (ref 26.5–33)
MCH RBC QN AUTO: 36.1 PG (ref 26.5–33)
MCHC RBC AUTO-ENTMCNC: 32.6 G/DL (ref 31.5–36.5)
MCHC RBC AUTO-ENTMCNC: 32.9 G/DL (ref 31.5–36.5)
MCHC RBC AUTO-ENTMCNC: 33.5 G/DL (ref 31.5–36.5)
MCHC RBC AUTO-ENTMCNC: 33.6 G/DL (ref 31.5–36.5)
MCHC RBC AUTO-ENTMCNC: 33.8 G/DL (ref 31.5–36.5)
MCHC RBC AUTO-ENTMCNC: 34 G/DL (ref 31.5–36.5)
MCHC RBC AUTO-ENTMCNC: 34.7 G/DL (ref 31.5–36.5)
MCHC RBC AUTO-ENTMCNC: 35.1 G/DL (ref 31.5–36.5)
MCV RBC AUTO: 100 FL (ref 78–100)
MCV RBC AUTO: 101 FL (ref 78–100)
MCV RBC AUTO: 102 FL (ref 78–100)
MCV RBC AUTO: 102 FL (ref 78–100)
MCV RBC AUTO: 104 FL (ref 78–100)
MCV RBC AUTO: 104 FL (ref 78–100)
MEV IGG SER QL IA: 6 AI (ref 0–0.8)
MONOCYTES # BLD AUTO: 0.4 10E9/L (ref 0–1.3)
MONOCYTES # BLD AUTO: 0.5 10E9/L (ref 0–1.3)
MONOCYTES NFR BLD AUTO: 5.1 %
MONOCYTES NFR BLD AUTO: 5.7 %
NEUTROPHILS # BLD AUTO: 6 10E9/L (ref 1.6–8.3)
NEUTROPHILS # BLD AUTO: 6.1 10E9/L (ref 1.6–8.3)
NEUTROPHILS NFR BLD AUTO: 73.3 %
NEUTROPHILS NFR BLD AUTO: 77 %
NITRATE UR QL: NEGATIVE
NRBC # BLD AUTO: 0 10*3/UL
NRBC # BLD AUTO: 0 10*3/UL
NRBC BLD AUTO-RTO: 0 /100
NRBC BLD AUTO-RTO: 0 /100
PH UR STRIP: 6 PH (ref 5–7)
PLATELET # BLD AUTO: 285 10E9/L (ref 150–450)
PLATELET # BLD AUTO: 294 10E9/L (ref 150–450)
PLATELET # BLD AUTO: 302 10E9/L (ref 150–450)
PLATELET # BLD AUTO: 310 10E9/L (ref 150–450)
PLATELET # BLD AUTO: 318 10E9/L (ref 150–450)
PLATELET # BLD AUTO: 337 10E9/L (ref 150–450)
PLATELET # BLD AUTO: 349 10E9/L (ref 150–450)
PLATELET # BLD AUTO: 392 10E9/L (ref 150–450)
POTASSIUM SERPL-SCNC: 4.3 MMOL/L (ref 3.4–5.3)
POTASSIUM SERPL-SCNC: 4.4 MMOL/L (ref 3.4–5.3)
POTASSIUM SERPL-SCNC: 4.5 MMOL/L (ref 3.4–5.3)
POTASSIUM SERPL-SCNC: 4.8 MMOL/L (ref 3.4–5.3)
POTASSIUM SERPL-SCNC: 5 MMOL/L (ref 3.4–5.3)
PROT SERPL-MCNC: 6.1 G/DL (ref 6.8–8.8)
RBC # BLD AUTO: 2.23 10E12/L (ref 3.8–5.2)
RBC # BLD AUTO: 2.24 10E12/L (ref 3.8–5.2)
RBC # BLD AUTO: 2.26 10E12/L (ref 3.8–5.2)
RBC # BLD AUTO: 2.27 10E12/L (ref 3.8–5.2)
RBC # BLD AUTO: 2.3 10E12/L (ref 3.8–5.2)
RBC # BLD AUTO: 2.4 10E12/L (ref 3.8–5.2)
RBC # BLD AUTO: 2.54 10E12/L (ref 3.8–5.2)
RBC # BLD AUTO: 2.82 10E12/L (ref 3.8–5.2)
RBC #/AREA URNS AUTO: 5 /HPF (ref 0–2)
SODIUM SERPL-SCNC: 137 MMOL/L (ref 133–144)
SODIUM SERPL-SCNC: 139 MMOL/L (ref 133–144)
SODIUM SERPL-SCNC: 139 MMOL/L (ref 133–144)
SODIUM SERPL-SCNC: 141 MMOL/L (ref 133–144)
SODIUM SERPL-SCNC: 143 MMOL/L (ref 133–144)
SOURCE: ABNORMAL
SP GR UR STRIP: 1.01 (ref 1–1.03)
SPECIMEN SOURCE: NORMAL
SPECIMEN SOURCE: NORMAL
SQUAMOUS #/AREA URNS AUTO: <1 /HPF (ref 0–1)
UROBILINOGEN UR STRIP-MCNC: NORMAL MG/DL (ref 0–2)
WBC # BLD AUTO: 12.3 10E9/L (ref 4–11)
WBC # BLD AUTO: 6.6 10E9/L (ref 4–11)
WBC # BLD AUTO: 7.5 10E9/L (ref 4–11)
WBC # BLD AUTO: 7.9 10E9/L (ref 4–11)
WBC # BLD AUTO: 7.9 10E9/L (ref 4–11)
WBC # BLD AUTO: 8.2 10E9/L (ref 4–11)
WBC # BLD AUTO: 9 10E9/L (ref 4–11)
WBC # BLD AUTO: 9.3 10E9/L (ref 4–11)
WBC #/AREA URNS AUTO: 2 /HPF (ref 0–5)

## 2019-01-01 PROCEDURE — 87040 BLOOD CULTURE FOR BACTERIA: CPT | Performed by: EMERGENCY MEDICINE

## 2019-01-01 PROCEDURE — 85027 COMPLETE CBC AUTOMATED: CPT | Performed by: EMERGENCY MEDICINE

## 2019-01-01 PROCEDURE — 99285 EMERGENCY DEPT VISIT HI MDM: CPT | Mod: 25

## 2019-01-01 PROCEDURE — 99232 SBSQ HOSP IP/OBS MODERATE 35: CPT | Performed by: INTERNAL MEDICINE

## 2019-01-01 PROCEDURE — 12000000 ZZH R&B MED SURG/OB

## 2019-01-01 PROCEDURE — 80076 HEPATIC FUNCTION PANEL: CPT | Performed by: INTERNAL MEDICINE

## 2019-01-01 PROCEDURE — 99213 OFFICE O/P EST LOW 20 MIN: CPT | Mod: 25 | Performed by: INTERNAL MEDICINE

## 2019-01-01 PROCEDURE — 22310 CLOSED TX VERT FX W/O MANJ: CPT

## 2019-01-01 PROCEDURE — 25000132 ZZH RX MED GY IP 250 OP 250 PS 637: Performed by: INTERNAL MEDICINE

## 2019-01-01 PROCEDURE — 72131 CT LUMBAR SPINE W/O DYE: CPT

## 2019-01-01 PROCEDURE — 73590 X-RAY EXAM OF LOWER LEG: CPT | Mod: RT

## 2019-01-01 PROCEDURE — 36415 COLL VENOUS BLD VENIPUNCTURE: CPT | Performed by: INTERNAL MEDICINE

## 2019-01-01 PROCEDURE — 40000894 ZZH STATISTIC OT IP EVAL DEFER: Performed by: OCCUPATIONAL THERAPIST

## 2019-01-01 PROCEDURE — 80048 BASIC METABOLIC PNL TOTAL CA: CPT | Performed by: INTERNAL MEDICINE

## 2019-01-01 PROCEDURE — 86765 RUBEOLA ANTIBODY: CPT | Performed by: INTERNAL MEDICINE

## 2019-01-01 PROCEDURE — 25000132 ZZH RX MED GY IP 250 OP 250 PS 637

## 2019-01-01 PROCEDURE — 99207 ZZC CDG-MDM COMPONENT: MEETS MODERATE - UP CODED: CPT | Performed by: INTERNAL MEDICINE

## 2019-01-01 PROCEDURE — 99231 SBSQ HOSP IP/OBS SF/LOW 25: CPT | Performed by: SURGERY

## 2019-01-01 PROCEDURE — 99222 1ST HOSP IP/OBS MODERATE 55: CPT | Mod: AI | Performed by: INTERNAL MEDICINE

## 2019-01-01 PROCEDURE — 90662 IIV NO PRSV INCREASED AG IM: CPT | Performed by: INTERNAL MEDICINE

## 2019-01-01 PROCEDURE — G0008 ADMIN INFLUENZA VIRUS VAC: HCPCS | Performed by: INTERNAL MEDICINE

## 2019-01-01 PROCEDURE — 99221 1ST HOSP IP/OBS SF/LOW 40: CPT | Performed by: PSYCHIATRY & NEUROLOGY

## 2019-01-01 PROCEDURE — 97530 THERAPEUTIC ACTIVITIES: CPT | Mod: GP

## 2019-01-01 PROCEDURE — 25000128 H RX IP 250 OP 636: Performed by: PHYSICIAN ASSISTANT

## 2019-01-01 PROCEDURE — 72100 X-RAY EXAM L-S SPINE 2/3 VWS: CPT

## 2019-01-01 PROCEDURE — 25000125 ZZHC RX 250

## 2019-01-01 PROCEDURE — 85652 RBC SED RATE AUTOMATED: CPT | Performed by: EMERGENCY MEDICINE

## 2019-01-01 PROCEDURE — 97530 THERAPEUTIC ACTIVITIES: CPT | Mod: GP | Performed by: PHYSICAL THERAPIST

## 2019-01-01 PROCEDURE — 25000128 H RX IP 250 OP 636: Performed by: EMERGENCY MEDICINE

## 2019-01-01 PROCEDURE — 96361 HYDRATE IV INFUSION ADD-ON: CPT | Mod: 59

## 2019-01-01 PROCEDURE — 99285 EMERGENCY DEPT VISIT HI MDM: CPT | Mod: 27

## 2019-01-01 PROCEDURE — 90750 HZV VACC RECOMBINANT IM: CPT | Performed by: INTERNAL MEDICINE

## 2019-01-01 PROCEDURE — 86140 C-REACTIVE PROTEIN: CPT | Performed by: EMERGENCY MEDICINE

## 2019-01-01 PROCEDURE — G0378 HOSPITAL OBSERVATION PER HR: HCPCS

## 2019-01-01 PROCEDURE — 68300005 ZZH TRAUMA EVALUATION W/O CC LEVEL III

## 2019-01-01 PROCEDURE — 99223 1ST HOSP IP/OBS HIGH 75: CPT | Performed by: NURSE PRACTITIONER

## 2019-01-01 PROCEDURE — 73718 MRI LOWER EXTREMITY W/O DYE: CPT | Mod: RT

## 2019-01-01 PROCEDURE — 99239 HOSP IP/OBS DSCHRG MGMT >30: CPT | Mod: GW | Performed by: INTERNAL MEDICINE

## 2019-01-01 PROCEDURE — 80048 BASIC METABOLIC PNL TOTAL CA: CPT | Performed by: PHYSICIAN ASSISTANT

## 2019-01-01 PROCEDURE — 99214 OFFICE O/P EST MOD 30 MIN: CPT | Mod: 25 | Performed by: INTERNAL MEDICINE

## 2019-01-01 PROCEDURE — 85027 COMPLETE CBC AUTOMATED: CPT | Performed by: INTERNAL MEDICINE

## 2019-01-01 PROCEDURE — 81001 URINALYSIS AUTO W/SCOPE: CPT | Performed by: PHYSICIAN ASSISTANT

## 2019-01-01 PROCEDURE — 25000128 H RX IP 250 OP 636: Performed by: INTERNAL MEDICINE

## 2019-01-01 PROCEDURE — 96374 THER/PROPH/DIAG INJ IV PUSH: CPT

## 2019-01-01 PROCEDURE — 97161 PT EVAL LOW COMPLEX 20 MIN: CPT | Mod: GP | Performed by: PHYSICAL THERAPIST

## 2019-01-01 PROCEDURE — 97161 PT EVAL LOW COMPLEX 20 MIN: CPT | Mod: GP

## 2019-01-01 PROCEDURE — 96360 HYDRATION IV INFUSION INIT: CPT

## 2019-01-01 PROCEDURE — 82565 ASSAY OF CREATININE: CPT | Performed by: INTERNAL MEDICINE

## 2019-01-01 PROCEDURE — 99285 EMERGENCY DEPT VISIT HI MDM: CPT

## 2019-01-01 PROCEDURE — 97165 OT EVAL LOW COMPLEX 30 MIN: CPT | Mod: GO | Performed by: OCCUPATIONAL THERAPIST

## 2019-01-01 PROCEDURE — 99223 1ST HOSP IP/OBS HIGH 75: CPT | Mod: AI | Performed by: INTERNAL MEDICINE

## 2019-01-01 PROCEDURE — 99217 ZZC OBSERVATION CARE DISCHARGE: CPT | Performed by: INTERNAL MEDICINE

## 2019-01-01 PROCEDURE — 73502 X-RAY EXAM HIP UNI 2-3 VIEWS: CPT

## 2019-01-01 PROCEDURE — 99232 SBSQ HOSP IP/OBS MODERATE 35: CPT | Performed by: PSYCHIATRY & NEUROLOGY

## 2019-01-01 PROCEDURE — 85025 COMPLETE CBC W/AUTO DIFF WBC: CPT | Performed by: PHYSICIAN ASSISTANT

## 2019-01-01 PROCEDURE — G0463 HOSPITAL OUTPT CLINIC VISIT: HCPCS

## 2019-01-01 PROCEDURE — 85025 COMPLETE CBC W/AUTO DIFF WBC: CPT | Performed by: EMERGENCY MEDICINE

## 2019-01-01 PROCEDURE — 70450 CT HEAD/BRAIN W/O DYE: CPT

## 2019-01-01 PROCEDURE — 80048 BASIC METABOLIC PNL TOTAL CA: CPT | Performed by: EMERGENCY MEDICINE

## 2019-01-01 PROCEDURE — 99222 1ST HOSP IP/OBS MODERATE 55: CPT | Performed by: SURGERY

## 2019-01-01 PROCEDURE — 90471 IMMUNIZATION ADMIN: CPT | Performed by: INTERNAL MEDICINE

## 2019-01-01 RX ORDER — MIRTAZAPINE 15 MG/1
15 TABLET, FILM COATED ORAL AT BEDTIME
Status: DISCONTINUED | OUTPATIENT
Start: 2019-01-01 | End: 2019-01-01 | Stop reason: HOSPADM

## 2019-01-01 RX ORDER — ACETAMINOPHEN 500 MG
1000 TABLET ORAL 3 TIMES DAILY
Status: DISCONTINUED | OUTPATIENT
Start: 2019-01-01 | End: 2019-01-01 | Stop reason: HOSPADM

## 2019-01-01 RX ORDER — AMLODIPINE BESYLATE 10 MG/1
10 TABLET ORAL DAILY
Qty: 90 TABLET | Refills: 3 | Status: SHIPPED | OUTPATIENT
Start: 2019-01-01

## 2019-01-01 RX ORDER — VALSARTAN AND HYDROCHLOROTHIAZIDE 320; 12.5 MG/1; MG/1
1 TABLET, FILM COATED ORAL DAILY
Qty: 90 TABLET | Refills: 3 | Status: SHIPPED | OUTPATIENT
Start: 2019-01-01 | End: 2019-01-01

## 2019-01-01 RX ORDER — CARBAMAZEPINE 200 MG/1
TABLET ORAL
Qty: 75 TABLET | Refills: 11 | Status: SHIPPED | OUTPATIENT
Start: 2019-01-01 | End: 2019-01-01

## 2019-01-01 RX ORDER — FUROSEMIDE 40 MG
40 TABLET ORAL DAILY
Status: DISCONTINUED | OUTPATIENT
Start: 2019-01-01 | End: 2019-01-01 | Stop reason: HOSPADM

## 2019-01-01 RX ORDER — CARBAMAZEPINE 200 MG/1
300 TABLET ORAL EVERY EVENING
COMMUNITY
End: 2019-01-01

## 2019-01-01 RX ORDER — POLYETHYLENE GLYCOL 3350 17 G/17G
1 POWDER, FOR SOLUTION ORAL DAILY
COMMUNITY

## 2019-01-01 RX ORDER — MIRTAZAPINE 15 MG/1
15 TABLET, FILM COATED ORAL AT BEDTIME
Qty: 30 TABLET | Refills: 1 | Status: ON HOLD | OUTPATIENT
Start: 2019-01-01 | End: 2019-01-01

## 2019-01-01 RX ORDER — LORAZEPAM 0.5 MG/1
.25-.5 TABLET ORAL EVERY 4 HOURS PRN
Qty: 30 TABLET | Refills: 1 | Status: SHIPPED | OUTPATIENT
Start: 2019-01-01

## 2019-01-01 RX ORDER — AMLODIPINE BESYLATE 10 MG/1
10 TABLET ORAL DAILY
Status: DISCONTINUED | OUTPATIENT
Start: 2019-01-01 | End: 2019-01-01 | Stop reason: HOSPADM

## 2019-01-01 RX ORDER — ACETAMINOPHEN 650 MG/1
650 SUPPOSITORY RECTAL EVERY 4 HOURS PRN
Status: DISCONTINUED | OUTPATIENT
Start: 2019-01-01 | End: 2019-01-01 | Stop reason: HOSPADM

## 2019-01-01 RX ORDER — TRAMADOL HYDROCHLORIDE 50 MG/1
50 TABLET ORAL EVERY 6 HOURS PRN
Status: ON HOLD | COMMUNITY
End: 2019-01-01

## 2019-01-01 RX ORDER — TRAMADOL HYDROCHLORIDE 50 MG/1
50 TABLET ORAL EVERY 6 HOURS PRN
Status: DISCONTINUED | OUTPATIENT
Start: 2019-01-01 | End: 2019-01-01 | Stop reason: HOSPADM

## 2019-01-01 RX ORDER — TIMOLOL MALEATE 5 MG/ML
1 SOLUTION/ DROPS OPHTHALMIC DAILY
COMMUNITY

## 2019-01-01 RX ORDER — ACETAMINOPHEN 325 MG/1
650 TABLET ORAL EVERY 4 HOURS PRN
Status: DISCONTINUED | OUTPATIENT
Start: 2019-01-01 | End: 2019-01-01 | Stop reason: HOSPADM

## 2019-01-01 RX ORDER — LATANOPROST 50 UG/ML
1 SOLUTION/ DROPS OPHTHALMIC AT BEDTIME
Qty: 7.5 ML | Refills: 3 | Status: SHIPPED | OUTPATIENT
Start: 2019-01-01

## 2019-01-01 RX ORDER — FERROUS SULFATE 325(65) MG
325 TABLET ORAL 2 TIMES DAILY WITH MEALS
Status: DISCONTINUED | OUTPATIENT
Start: 2019-01-01 | End: 2019-01-01 | Stop reason: HOSPADM

## 2019-01-01 RX ORDER — OLANZAPINE 5 MG/1
5 TABLET, ORALLY DISINTEGRATING ORAL ONCE
Status: COMPLETED | OUTPATIENT
Start: 2019-01-01 | End: 2019-01-01

## 2019-01-01 RX ORDER — NALOXONE HYDROCHLORIDE 0.4 MG/ML
.1-.4 INJECTION, SOLUTION INTRAMUSCULAR; INTRAVENOUS; SUBCUTANEOUS
Status: DISCONTINUED | OUTPATIENT
Start: 2019-01-01 | End: 2019-01-01 | Stop reason: HOSPADM

## 2019-01-01 RX ORDER — FERROUS SULFATE 325(65) MG
325 TABLET ORAL 2 TIMES DAILY WITH MEALS
Qty: 60 TABLET | Refills: 11 | Status: ON HOLD | OUTPATIENT
Start: 2019-01-01 | End: 2019-01-01

## 2019-01-01 RX ORDER — POLYETHYLENE GLYCOL 3350 17 G/17G
17 POWDER, FOR SOLUTION ORAL DAILY
Status: DISCONTINUED | OUTPATIENT
Start: 2019-01-01 | End: 2019-01-01 | Stop reason: HOSPADM

## 2019-01-01 RX ORDER — CEFTRIAXONE 1 G/1
1 INJECTION, POWDER, FOR SOLUTION INTRAMUSCULAR; INTRAVENOUS EVERY 24 HOURS
Status: DISCONTINUED | OUTPATIENT
Start: 2019-01-01 | End: 2019-01-01

## 2019-01-01 RX ORDER — SENNOSIDES 8.6 MG
1-2 TABLET ORAL 2 TIMES DAILY
Qty: 100 TABLET | Refills: 1 | Status: SHIPPED | OUTPATIENT
Start: 2019-01-01

## 2019-01-01 RX ORDER — WATER 10 ML/10ML
INJECTION INTRAMUSCULAR; INTRAVENOUS; SUBCUTANEOUS
Status: COMPLETED
Start: 2019-01-01 | End: 2019-01-01

## 2019-01-01 RX ORDER — OLANZAPINE 5 MG/1
5 TABLET, ORALLY DISINTEGRATING ORAL
Status: DISCONTINUED | OUTPATIENT
Start: 2019-01-01 | End: 2019-01-01

## 2019-01-01 RX ORDER — OLANZAPINE 5 MG/1
5 TABLET, ORALLY DISINTEGRATING ORAL AT BEDTIME
Qty: 30 TABLET | Refills: 0 | Status: SHIPPED | OUTPATIENT
Start: 2019-01-01

## 2019-01-01 RX ORDER — ACETAMINOPHEN 650 MG/1
650 SUPPOSITORY RECTAL EVERY 4 HOURS PRN
Qty: 12 SUPPOSITORY | Refills: 1 | Status: SHIPPED | OUTPATIENT
Start: 2019-01-01

## 2019-01-01 RX ORDER — OLANZAPINE 5 MG/1
5 TABLET, ORALLY DISINTEGRATING ORAL AT BEDTIME
Status: DISCONTINUED | OUTPATIENT
Start: 2019-01-01 | End: 2019-01-01 | Stop reason: HOSPADM

## 2019-01-01 RX ORDER — VALSARTAN 160 MG/1
320 TABLET ORAL DAILY
Status: DISCONTINUED | OUTPATIENT
Start: 2019-01-01 | End: 2019-01-01 | Stop reason: HOSPADM

## 2019-01-01 RX ORDER — LIDOCAINE 40 MG/G
CREAM TOPICAL
Status: DISCONTINUED | OUTPATIENT
Start: 2019-01-01 | End: 2019-01-01 | Stop reason: HOSPADM

## 2019-01-01 RX ORDER — MIRTAZAPINE 7.5 MG/1
7.5 TABLET, FILM COATED ORAL AT BEDTIME
Status: DISCONTINUED | OUTPATIENT
Start: 2019-01-01 | End: 2019-01-01

## 2019-01-01 RX ORDER — LATANOPROST 50 UG/ML
1 SOLUTION/ DROPS OPHTHALMIC AT BEDTIME
Status: DISCONTINUED | OUTPATIENT
Start: 2019-01-01 | End: 2019-01-01 | Stop reason: HOSPADM

## 2019-01-01 RX ORDER — ASPIRIN 81 MG/1
81 TABLET, CHEWABLE ORAL DAILY
Status: DISCONTINUED | OUTPATIENT
Start: 2019-01-01 | End: 2019-01-01 | Stop reason: HOSPADM

## 2019-01-01 RX ORDER — BENZTROPINE MESYLATE 1 MG/1
1-2 TABLET ORAL 3 TIMES DAILY PRN
Status: DISCONTINUED | OUTPATIENT
Start: 2019-01-01 | End: 2019-01-01

## 2019-01-01 RX ORDER — TIMOLOL MALEATE 5 MG/ML
1 SOLUTION/ DROPS OPHTHALMIC DAILY
Status: DISCONTINUED | OUTPATIENT
Start: 2019-01-01 | End: 2019-01-01 | Stop reason: HOSPADM

## 2019-01-01 RX ORDER — SERTRALINE HYDROCHLORIDE 100 MG/1
100 TABLET, FILM COATED ORAL DAILY
Status: DISCONTINUED | OUTPATIENT
Start: 2019-01-01 | End: 2019-01-01 | Stop reason: HOSPADM

## 2019-01-01 RX ORDER — AMOXICILLIN 250 MG
2 CAPSULE ORAL 2 TIMES DAILY PRN
Status: DISCONTINUED | OUTPATIENT
Start: 2019-01-01 | End: 2019-01-01 | Stop reason: HOSPADM

## 2019-01-01 RX ORDER — CEFTRIAXONE 2 G/1
2 INJECTION, POWDER, FOR SOLUTION INTRAMUSCULAR; INTRAVENOUS ONCE
Status: COMPLETED | OUTPATIENT
Start: 2019-01-01 | End: 2019-01-01

## 2019-01-01 RX ORDER — ACETAMINOPHEN 500 MG
1000 TABLET ORAL 3 TIMES DAILY
Qty: 540 TABLET | Refills: 1 | Status: SHIPPED | OUTPATIENT
Start: 2019-01-01

## 2019-01-01 RX ORDER — HYDROCHLOROTHIAZIDE 12.5 MG/1
12.5 CAPSULE ORAL DAILY
Status: DISCONTINUED | OUTPATIENT
Start: 2019-01-01 | End: 2019-01-01 | Stop reason: HOSPADM

## 2019-01-01 RX ORDER — ATROPINE SULFATE 10 MG/ML
2-4 SOLUTION/ DROPS OPHTHALMIC
Qty: 5 ML | Refills: 1 | Status: SHIPPED | OUTPATIENT
Start: 2019-01-01

## 2019-01-01 RX ORDER — HYDRALAZINE HYDROCHLORIDE 20 MG/ML
10 INJECTION INTRAMUSCULAR; INTRAVENOUS EVERY 4 HOURS PRN
Status: DISCONTINUED | OUTPATIENT
Start: 2019-01-01 | End: 2019-01-01 | Stop reason: HOSPADM

## 2019-01-01 RX ORDER — FUROSEMIDE 40 MG
40 TABLET ORAL DAILY
Qty: 90 TABLET | Refills: 3 | Status: ON HOLD | OUTPATIENT
Start: 2019-01-01 | End: 2019-01-01

## 2019-01-01 RX ORDER — VALSARTAN AND HYDROCHLOROTHIAZIDE 320; 12.5 MG/1; MG/1
1 TABLET, FILM COATED ORAL DAILY
Status: DISCONTINUED | OUTPATIENT
Start: 2019-01-01 | End: 2019-01-01 | Stop reason: CLARIF

## 2019-01-01 RX ORDER — VANCOMYCIN HYDROCHLORIDE 1 G/200ML
1000 INJECTION, SOLUTION INTRAVENOUS
Status: DISCONTINUED | OUTPATIENT
Start: 2019-01-01 | End: 2019-01-01

## 2019-01-01 RX ORDER — BISACODYL 10 MG
SUPPOSITORY, RECTAL RECTAL
Qty: 12 SUPPOSITORY | Refills: 1 | Status: SHIPPED | OUTPATIENT
Start: 2019-01-01

## 2019-01-01 RX ORDER — ONDANSETRON 4 MG/1
4 TABLET, ORALLY DISINTEGRATING ORAL EVERY 6 HOURS PRN
Status: DISCONTINUED | OUTPATIENT
Start: 2019-01-01 | End: 2019-01-01 | Stop reason: HOSPADM

## 2019-01-01 RX ORDER — ASPIRIN 81 MG/1
TABLET, CHEWABLE ORAL
Qty: 30 TABLET | Refills: 11 | Status: SHIPPED | OUTPATIENT
Start: 2019-01-01

## 2019-01-01 RX ORDER — FUROSEMIDE 80 MG
80 TABLET ORAL DAILY
Qty: 90 TABLET | Refills: 3 | Status: SHIPPED | OUTPATIENT
Start: 2019-01-01 | End: 2019-01-01

## 2019-01-01 RX ORDER — CARBAMAZEPINE 200 MG/1
200 TABLET ORAL EVERY MORNING
COMMUNITY

## 2019-01-01 RX ORDER — LIDOCAINE 4 G/G
1 PATCH TOPICAL
Status: DISCONTINUED | OUTPATIENT
Start: 2019-01-01 | End: 2019-01-01 | Stop reason: HOSPADM

## 2019-01-01 RX ORDER — MIRTAZAPINE 7.5 MG/1
7.5 TABLET, FILM COATED ORAL AT BEDTIME
Qty: 90 TABLET | Refills: 3 | Status: SHIPPED | OUTPATIENT
Start: 2019-01-01 | End: 2019-01-01

## 2019-01-01 RX ORDER — CARBAMAZEPINE 200 MG/1
200 TABLET ORAL EVERY MORNING
Status: DISCONTINUED | OUTPATIENT
Start: 2019-01-01 | End: 2019-01-01 | Stop reason: HOSPADM

## 2019-01-01 RX ORDER — AMOXICILLIN 250 MG
1 CAPSULE ORAL 2 TIMES DAILY PRN
Status: DISCONTINUED | OUTPATIENT
Start: 2019-01-01 | End: 2019-01-01 | Stop reason: HOSPADM

## 2019-01-01 RX ORDER — BISACODYL 10 MG
10 SUPPOSITORY, RECTAL RECTAL DAILY PRN
Status: DISCONTINUED | OUTPATIENT
Start: 2019-01-01 | End: 2019-01-01 | Stop reason: HOSPADM

## 2019-01-01 RX ORDER — HYDROCHLOROTHIAZIDE 12.5 MG/1
12.5 TABLET ORAL DAILY
Status: DISCONTINUED | OUTPATIENT
Start: 2019-01-01 | End: 2019-01-01 | Stop reason: HOSPADM

## 2019-01-01 RX ORDER — CARBAMAZEPINE 200 MG/1
300 TABLET ORAL EVERY EVENING
COMMUNITY

## 2019-01-01 RX ORDER — MULTIVIT-MIN/IRON/FOLIC ACID/K 18-600-40
1000 CAPSULE ORAL DAILY
Qty: 30 TABLET | Refills: 11 | Status: ON HOLD | OUTPATIENT
Start: 2019-01-01 | End: 2019-01-01

## 2019-01-01 RX ORDER — CARBAMAZEPINE 200 MG/1
200 TABLET ORAL EVERY MORNING
COMMUNITY
End: 2019-01-01

## 2019-01-01 RX ORDER — ONDANSETRON 2 MG/ML
4 INJECTION INTRAMUSCULAR; INTRAVENOUS EVERY 6 HOURS PRN
Status: DISCONTINUED | OUTPATIENT
Start: 2019-01-01 | End: 2019-01-01 | Stop reason: HOSPADM

## 2019-01-01 RX ORDER — OLANZAPINE 5 MG/1
5 TABLET, ORALLY DISINTEGRATING ORAL 2 TIMES DAILY PRN
Status: DISCONTINUED | OUTPATIENT
Start: 2019-01-01 | End: 2019-01-01 | Stop reason: HOSPADM

## 2019-01-01 RX ORDER — VALSARTAN AND HYDROCHLOROTHIAZIDE 320; 12.5 MG/1; MG/1
1 TABLET, FILM COATED ORAL DAILY
Qty: 90 TABLET | Refills: 3 | Status: SHIPPED | OUTPATIENT
Start: 2019-01-01

## 2019-01-01 RX ORDER — VALSARTAN AND HYDROCHLOROTHIAZIDE 320; 12.5 MG/1; MG/1
1 TABLET, FILM COATED ORAL DAILY
Status: DISCONTINUED | OUTPATIENT
Start: 2019-01-01 | End: 2019-01-01

## 2019-01-01 RX ORDER — SERTRALINE HYDROCHLORIDE 100 MG/1
100 TABLET, FILM COATED ORAL DAILY
Qty: 90 TABLET | Refills: 3 | Status: SHIPPED | OUTPATIENT
Start: 2019-01-01 | End: 2019-01-01

## 2019-01-01 RX ORDER — SERTRALINE HYDROCHLORIDE 100 MG/1
100 TABLET, FILM COATED ORAL DAILY
Qty: 90 TABLET | Refills: 3 | Status: ON HOLD | OUTPATIENT
Start: 2019-01-01 | End: 2019-01-01

## 2019-01-01 RX ORDER — HYDROMORPHONE HYDROCHLORIDE 2 MG/1
1-2 TABLET ORAL
Qty: 90 TABLET | Refills: 0 | Status: SHIPPED | OUTPATIENT
Start: 2019-01-01

## 2019-01-01 RX ORDER — OLANZAPINE 10 MG/2ML
2.5 INJECTION, POWDER, FOR SOLUTION INTRAMUSCULAR EVERY 6 HOURS PRN
Status: DISCONTINUED | OUTPATIENT
Start: 2019-01-01 | End: 2019-01-01 | Stop reason: HOSPADM

## 2019-01-01 RX ADMIN — VANCOMYCIN HYDROCHLORIDE 1000 MG: 1 INJECTION, SOLUTION INTRAVENOUS at 18:44

## 2019-01-01 RX ADMIN — CARBAMAZEPINE 300 MG: 200 TABLET ORAL at 21:10

## 2019-01-01 RX ADMIN — CARBAMAZEPINE 300 MG: 200 TABLET ORAL at 19:47

## 2019-01-01 RX ADMIN — CARBAMAZEPINE 300 MG: 200 TABLET ORAL at 20:14

## 2019-01-01 RX ADMIN — CARBAMAZEPINE 200 MG: 200 TABLET ORAL at 11:31

## 2019-01-01 RX ADMIN — CARBAMAZEPINE 200 MG: 200 TABLET ORAL at 09:01

## 2019-01-01 RX ADMIN — FERROUS SULFATE TAB 325 MG (65 MG ELEMENTAL FE) 325 MG: 325 (65 FE) TAB at 18:24

## 2019-01-01 RX ADMIN — FERROUS SULFATE TAB 325 MG (65 MG ELEMENTAL FE) 325 MG: 325 (65 FE) TAB at 08:12

## 2019-01-01 RX ADMIN — CEPHALEXIN 250 MG: 250 CAPSULE ORAL at 15:31

## 2019-01-01 RX ADMIN — OLANZAPINE 5 MG: 5 TABLET, ORALLY DISINTEGRATING ORAL at 11:19

## 2019-01-01 RX ADMIN — DEXTRAN 70 AND HYPROMELLOSE 2910 2 DROP: 1; 3 SOLUTION/ DROPS OPHTHALMIC at 23:56

## 2019-01-01 RX ADMIN — Medication 1 LOZENGE: at 21:56

## 2019-01-01 RX ADMIN — FERROUS SULFATE TAB 325 MG (65 MG ELEMENTAL FE) 325 MG: 325 (65 FE) TAB at 17:50

## 2019-01-01 RX ADMIN — CEPHALEXIN 250 MG: 250 CAPSULE ORAL at 08:12

## 2019-01-01 RX ADMIN — CARBAMAZEPINE 200 MG: 200 TABLET ORAL at 10:18

## 2019-01-01 RX ADMIN — DEXTRAN 70 AND HYPROMELLOSE 2910 2 DROP: 1; 3 SOLUTION/ DROPS OPHTHALMIC at 19:55

## 2019-01-01 RX ADMIN — HYDROCHLOROTHIAZIDE 12.5 MG: 12.5 TABLET ORAL at 07:47

## 2019-01-01 RX ADMIN — FERROUS SULFATE TAB 325 MG (65 MG ELEMENTAL FE) 325 MG: 325 (65 FE) TAB at 19:22

## 2019-01-01 RX ADMIN — SODIUM CHLORIDE 1000 ML: 9 INJECTION, SOLUTION INTRAVENOUS at 13:57

## 2019-01-01 RX ADMIN — MELATONIN 1000 UNITS: at 08:30

## 2019-01-01 RX ADMIN — FERROUS SULFATE TAB 325 MG (65 MG ELEMENTAL FE) 325 MG: 325 (65 FE) TAB at 10:18

## 2019-01-01 RX ADMIN — SERTRALINE HYDROCHLORIDE 100 MG: 100 TABLET ORAL at 09:01

## 2019-01-01 RX ADMIN — Medication 1 LOZENGE: at 16:19

## 2019-01-01 RX ADMIN — AMLODIPINE BESYLATE 10 MG: 10 TABLET ORAL at 07:47

## 2019-01-01 RX ADMIN — POLYETHYLENE GLYCOL 3350 17 G: 17 POWDER, FOR SOLUTION ORAL at 08:45

## 2019-01-01 RX ADMIN — DEXTRAN 70 AND HYPROMELLOSE 2910 2 DROP: 1; 3 SOLUTION/ DROPS OPHTHALMIC at 20:18

## 2019-01-01 RX ADMIN — CARBAMAZEPINE 200 MG: 200 TABLET ORAL at 08:29

## 2019-01-01 RX ADMIN — HYDROCHLOROTHIAZIDE 12.5 MG: 12.5 TABLET ORAL at 11:30

## 2019-01-01 RX ADMIN — OLANZAPINE 5 MG: 5 TABLET, ORALLY DISINTEGRATING ORAL at 19:55

## 2019-01-01 RX ADMIN — VALSARTAN 320 MG: 160 TABLET, FILM COATED ORAL at 11:30

## 2019-01-01 RX ADMIN — SERTRALINE HYDROCHLORIDE 100 MG: 100 TABLET ORAL at 11:31

## 2019-01-01 RX ADMIN — VALSARTAN 320 MG: 160 TABLET, FILM COATED ORAL at 08:13

## 2019-01-01 RX ADMIN — HYDRALAZINE HYDROCHLORIDE 10 MG: 20 INJECTION INTRAMUSCULAR; INTRAVENOUS at 18:51

## 2019-01-01 RX ADMIN — OLANZAPINE 5 MG: 5 TABLET, ORALLY DISINTEGRATING ORAL at 21:15

## 2019-01-01 RX ADMIN — SERTRALINE HYDROCHLORIDE 100 MG: 100 TABLET ORAL at 07:48

## 2019-01-01 RX ADMIN — ACETAMINOPHEN 1000 MG: 500 TABLET, FILM COATED ORAL at 08:28

## 2019-01-01 RX ADMIN — FERROUS SULFATE TAB 325 MG (65 MG ELEMENTAL FE) 325 MG: 325 (65 FE) TAB at 08:30

## 2019-01-01 RX ADMIN — VALSARTAN 320 MG: 160 TABLET, FILM COATED ORAL at 07:48

## 2019-01-01 RX ADMIN — AMLODIPINE BESYLATE 10 MG: 10 TABLET ORAL at 10:18

## 2019-01-01 RX ADMIN — MIRTAZAPINE 15 MG: 15 TABLET, FILM COATED ORAL at 21:46

## 2019-01-01 RX ADMIN — DEXTRAN 70 AND HYPROMELLOSE 2910 2 DROP: 1; 3 SOLUTION/ DROPS OPHTHALMIC at 19:13

## 2019-01-01 RX ADMIN — AMLODIPINE BESYLATE 10 MG: 10 TABLET ORAL at 08:29

## 2019-01-01 RX ADMIN — MIRTAZAPINE 15 MG: 15 TABLET, FILM COATED ORAL at 21:01

## 2019-01-01 RX ADMIN — CARBAMAZEPINE 300 MG: 200 TABLET ORAL at 19:55

## 2019-01-01 RX ADMIN — CEFTRIAXONE SODIUM 1 G: 1 INJECTION, POWDER, FOR SOLUTION INTRAMUSCULAR; INTRAVENOUS at 15:06

## 2019-01-01 RX ADMIN — VALSARTAN 320 MG: 160 TABLET, FILM COATED ORAL at 09:01

## 2019-01-01 RX ADMIN — HYDRALAZINE HYDROCHLORIDE 10 MG: 20 INJECTION INTRAMUSCULAR; INTRAVENOUS at 17:44

## 2019-01-01 RX ADMIN — ACETAMINOPHEN 1000 MG: 500 TABLET, FILM COATED ORAL at 14:00

## 2019-01-01 RX ADMIN — CEFTRIAXONE SODIUM 2 G: 2 INJECTION, POWDER, FOR SOLUTION INTRAMUSCULAR; INTRAVENOUS at 14:43

## 2019-01-01 RX ADMIN — MIRTAZAPINE 15 MG: 15 TABLET, FILM COATED ORAL at 21:15

## 2019-01-01 RX ADMIN — ASPIRIN 81 MG 81 MG: 81 TABLET ORAL at 10:18

## 2019-01-01 RX ADMIN — LIDOCAINE 1 PATCH: 560 PATCH PERCUTANEOUS; TOPICAL; TRANSDERMAL at 16:34

## 2019-01-01 RX ADMIN — LATANOPROST 1 DROP: 50 SOLUTION/ DROPS OPHTHALMIC at 20:26

## 2019-01-01 RX ADMIN — CARBAMAZEPINE 200 MG: 200 TABLET ORAL at 07:47

## 2019-01-01 RX ADMIN — WATER 10 ML: 1 INJECTION INTRAMUSCULAR; INTRAVENOUS; SUBCUTANEOUS at 22:46

## 2019-01-01 RX ADMIN — FERROUS SULFATE TAB 325 MG (65 MG ELEMENTAL FE) 325 MG: 325 (65 FE) TAB at 17:59

## 2019-01-01 RX ADMIN — FUROSEMIDE 40 MG: 40 TABLET ORAL at 08:30

## 2019-01-01 RX ADMIN — CEPHALEXIN 250 MG: 250 CAPSULE ORAL at 14:41

## 2019-01-01 RX ADMIN — VALSARTAN 320 MG: 160 TABLET, FILM COATED ORAL at 16:14

## 2019-01-01 RX ADMIN — CARBAMAZEPINE 300 MG: 200 TABLET ORAL at 20:24

## 2019-01-01 RX ADMIN — OLANZAPINE 5 MG: 5 TABLET, ORALLY DISINTEGRATING ORAL at 22:16

## 2019-01-01 RX ADMIN — AMLODIPINE BESYLATE 10 MG: 10 TABLET ORAL at 16:14

## 2019-01-01 RX ADMIN — Medication 1 LOZENGE: at 11:40

## 2019-01-01 RX ADMIN — Medication 1 LOZENGE: at 19:48

## 2019-01-01 RX ADMIN — DEXTRAN 70 AND HYPROMELLOSE 2910 2 DROP: 1; 3 SOLUTION/ DROPS OPHTHALMIC at 19:47

## 2019-01-01 RX ADMIN — AMLODIPINE BESYLATE 10 MG: 10 TABLET ORAL at 08:21

## 2019-01-01 RX ADMIN — HYDROCHLOROTHIAZIDE 12.5 MG: 12.5 TABLET ORAL at 10:17

## 2019-01-01 RX ADMIN — AMLODIPINE BESYLATE 10 MG: 10 TABLET ORAL at 11:31

## 2019-01-01 RX ADMIN — MIRTAZAPINE 15 MG: 15 TABLET, FILM COATED ORAL at 22:17

## 2019-01-01 RX ADMIN — FERROUS SULFATE TAB 325 MG (65 MG ELEMENTAL FE) 325 MG: 325 (65 FE) TAB at 09:01

## 2019-01-01 RX ADMIN — CARBAMAZEPINE 300 MG: 200 TABLET ORAL at 20:48

## 2019-01-01 RX ADMIN — CEPHALEXIN 250 MG: 250 CAPSULE ORAL at 22:17

## 2019-01-01 RX ADMIN — HYDROCHLOROTHIAZIDE 12.5 MG: 12.5 CAPSULE ORAL at 08:30

## 2019-01-01 RX ADMIN — FERROUS SULFATE TAB 325 MG (65 MG ELEMENTAL FE) 325 MG: 325 (65 FE) TAB at 16:07

## 2019-01-01 RX ADMIN — FERROUS SULFATE TAB 325 MG (65 MG ELEMENTAL FE) 325 MG: 325 (65 FE) TAB at 17:45

## 2019-01-01 RX ADMIN — CEPHALEXIN 250 MG: 250 CAPSULE ORAL at 21:15

## 2019-01-01 RX ADMIN — HYDROCHLOROTHIAZIDE 12.5 MG: 12.5 TABLET ORAL at 08:21

## 2019-01-01 RX ADMIN — Medication 1 TABLET: at 17:27

## 2019-01-01 RX ADMIN — ACETAMINOPHEN 1000 MG: 500 TABLET, FILM COATED ORAL at 20:24

## 2019-01-01 RX ADMIN — CEPHALEXIN 250 MG: 250 CAPSULE ORAL at 21:01

## 2019-01-01 RX ADMIN — FERROUS SULFATE TAB 325 MG (65 MG ELEMENTAL FE) 325 MG: 325 (65 FE) TAB at 16:55

## 2019-01-01 RX ADMIN — SERTRALINE HYDROCHLORIDE 100 MG: 100 TABLET ORAL at 08:13

## 2019-01-01 RX ADMIN — AMLODIPINE BESYLATE 10 MG: 10 TABLET ORAL at 09:01

## 2019-01-01 RX ADMIN — FERROUS SULFATE TAB 325 MG (65 MG ELEMENTAL FE) 325 MG: 325 (65 FE) TAB at 17:27

## 2019-01-01 RX ADMIN — CEPHALEXIN 250 MG: 250 CAPSULE ORAL at 12:49

## 2019-01-01 RX ADMIN — WATER 10 ML: 1 INJECTION INTRAMUSCULAR; INTRAVENOUS; SUBCUTANEOUS at 06:42

## 2019-01-01 RX ADMIN — OLANZAPINE 2.5 MG: 10 INJECTION, POWDER, FOR SOLUTION INTRAMUSCULAR at 22:46

## 2019-01-01 RX ADMIN — HYDROCHLOROTHIAZIDE 12.5 MG: 12.5 TABLET ORAL at 09:01

## 2019-01-01 RX ADMIN — DEXTRAN 70 AND HYPROMELLOSE 2910 2 DROP: 1; 3 SOLUTION/ DROPS OPHTHALMIC at 20:54

## 2019-01-01 RX ADMIN — MIRTAZAPINE 15 MG: 15 TABLET, FILM COATED ORAL at 20:24

## 2019-01-01 RX ADMIN — SERTRALINE HYDROCHLORIDE 100 MG: 100 TABLET ORAL at 10:18

## 2019-01-01 RX ADMIN — ACETAMINOPHEN 1000 MG: 500 TABLET, FILM COATED ORAL at 16:22

## 2019-01-01 RX ADMIN — FERROUS SULFATE TAB 325 MG (65 MG ELEMENTAL FE) 325 MG: 325 (65 FE) TAB at 14:54

## 2019-01-01 RX ADMIN — Medication 1 TABLET: at 08:29

## 2019-01-01 RX ADMIN — SERTRALINE HYDROCHLORIDE 100 MG: 100 TABLET ORAL at 08:29

## 2019-01-01 RX ADMIN — MIRTAZAPINE 15 MG: 15 TABLET, FILM COATED ORAL at 21:09

## 2019-01-01 RX ADMIN — FERROUS SULFATE TAB 325 MG (65 MG ELEMENTAL FE) 325 MG: 325 (65 FE) TAB at 07:47

## 2019-01-01 RX ADMIN — VALSARTAN 320 MG: 160 TABLET, FILM COATED ORAL at 08:29

## 2019-01-01 RX ADMIN — OLANZAPINE 2.5 MG: 10 INJECTION, POWDER, FOR SOLUTION INTRAMUSCULAR at 06:42

## 2019-01-01 RX ADMIN — HYDROCHLOROTHIAZIDE 12.5 MG: 12.5 TABLET ORAL at 16:14

## 2019-01-01 RX ADMIN — DEXTRAN 70 AND HYPROMELLOSE 2910 2 DROP: 1; 3 SOLUTION/ DROPS OPHTHALMIC at 06:27

## 2019-01-01 RX ADMIN — CEPHALEXIN 250 MG: 250 CAPSULE ORAL at 16:07

## 2019-01-01 RX ADMIN — MIRTAZAPINE 15 MG: 15 TABLET, FILM COATED ORAL at 19:55

## 2019-01-01 RX ADMIN — CEPHALEXIN 250 MG: 250 CAPSULE ORAL at 00:24

## 2019-01-01 RX ADMIN — CARBAMAZEPINE 300 MG: 200 TABLET ORAL at 19:22

## 2019-01-01 RX ADMIN — LIDOCAINE 1 PATCH: 560 PATCH PERCUTANEOUS; TOPICAL; TRANSDERMAL at 08:45

## 2019-01-01 RX ADMIN — VALSARTAN 320 MG: 160 TABLET, FILM COATED ORAL at 10:17

## 2019-01-01 RX ADMIN — CEPHALEXIN 250 MG: 250 CAPSULE ORAL at 13:40

## 2019-01-01 RX ADMIN — CARBAMAZEPINE 200 MG: 200 TABLET ORAL at 08:21

## 2019-01-01 RX ADMIN — Medication 1 MG: at 20:48

## 2019-01-01 RX ADMIN — OLANZAPINE 5 MG: 5 TABLET, ORALLY DISINTEGRATING ORAL at 21:01

## 2019-01-01 ASSESSMENT — ACTIVITIES OF DAILY LIVING (ADL)
WHICH_OF_THE_ABOVE_FUNCTIONAL_RISKS_HAD_A_RECENT_ONSET_OR_CHANGE?: FALL HISTORY
DEPENDENT_IADLS:: CLEANING;COOKING;LAUNDRY;SHOPPING;MEAL PREPARATION;TRANSPORTATION
ADLS_ACUITY_SCORE: 31
COGNITION: 0 - NO COGNITION ISSUES REPORTED
ADLS_ACUITY_SCORE: 35
ADLS_ACUITY_SCORE: 35
ADLS_ACUITY_SCORE: 30
ADLS_ACUITY_SCORE: 34
ADLS_ACUITY_SCORE: 29
ADLS_ACUITY_SCORE: 36
ADLS_ACUITY_SCORE: 29
ADLS_ACUITY_SCORE: 31
SWALLOWING: 0-->SWALLOWS FOODS/LIQUIDS WITHOUT DIFFICULTY
BATHING: 3-->ASSISTIVE EQUIPMENT AND PERSON
ADLS_ACUITY_SCORE: 31
ADLS_ACUITY_SCORE: 38
ADLS_ACUITY_SCORE: 34
ADLS_ACUITY_SCORE: 34
ADLS_ACUITY_SCORE: 39
NUMBER_OF_TIMES_PATIENT_HAS_FALLEN_WITHIN_LAST_SIX_MONTHS: 1
ADLS_ACUITY_SCORE: 32
RETIRED_COMMUNICATION: 0-->UNDERSTANDS/COMMUNICATES WITHOUT DIFFICULTY
DRESS: 1-->ASSISTIVE EQUIPMENT
TRANSFERRING: 3-->ASSISTIVE EQUIPMENT AND PERSON
ADLS_ACUITY_SCORE: 37
ADLS_ACUITY_SCORE: 38
BATHING: 1-->ASSISTIVE EQUIPMENT
ADLS_ACUITY_SCORE: 38
ADLS_ACUITY_SCORE: 38
ADLS_ACUITY_SCORE: 34
ADLS_ACUITY_SCORE: 31
ADLS_ACUITY_SCORE: 33
ADLS_ACUITY_SCORE: 35
WHICH_OF_THE_ABOVE_FUNCTIONAL_RISKS_HAD_A_RECENT_ONSET_OR_CHANGE?: AMBULATION;TRANSFERRING;FALL HISTORY
ADLS_ACUITY_SCORE: 35
ADLS_ACUITY_SCORE: 38
ADLS_ACUITY_SCORE: 34
ADLS_ACUITY_SCORE: 36
DEPENDENT_IADLS:: CLEANING;COOKING;LAUNDRY;SHOPPING;MEAL PREPARATION;TRANSPORTATION
FALL_HISTORY_WITHIN_LAST_SIX_MONTHS: YES
ADLS_ACUITY_SCORE: 34
TOILETING: 1-->ASSISTIVE EQUIPMENT
RETIRED_EATING: 0-->INDEPENDENT
ADLS_ACUITY_SCORE: 31
ADLS_ACUITY_SCORE: 35
ADLS_ACUITY_SCORE: 29
ADLS_ACUITY_SCORE: 34
TOILETING: 3-->ASSISTIVE EQUIPMENT AND PERSON
ADLS_ACUITY_SCORE: 37
ADLS_ACUITY_SCORE: 39
ADLS_ACUITY_SCORE: 34
ADLS_ACUITY_SCORE: 39
ADLS_ACUITY_SCORE: 39
ADLS_ACUITY_SCORE: 30
ADLS_ACUITY_SCORE: 30
ADLS_ACUITY_SCORE: 35
ADLS_ACUITY_SCORE: 39
RETIRED_COMMUNICATION: 0-->UNDERSTANDS/COMMUNICATES WITHOUT DIFFICULTY
COGNITION: 1 - ATTENTION OR MEMORY DEFICITS
ADLS_ACUITY_SCORE: 32
DRESS: 1-->ASSISTIVE EQUIPMENT
ADLS_ACUITY_SCORE: 35
ADLS_ACUITY_SCORE: 32
DEPENDENT_IADLS:: CLEANING;COOKING;LAUNDRY;SHOPPING;MEAL PREPARATION;TRANSPORTATION
ADLS_ACUITY_SCORE: 39
ADLS_ACUITY_SCORE: 36
AMBULATION: 3-->ASSISTIVE EQUIPMENT AND PERSON
RETIRED_EATING: 2-->ASSISTIVE PERSON
SWALLOWING: 0-->SWALLOWS FOODS/LIQUIDS WITHOUT DIFFICULTY

## 2019-01-01 ASSESSMENT — MIFFLIN-ST. JEOR
SCORE: 880.37
SCORE: 919.83
SCORE: 888.08
SCORE: 888.08
SCORE: 852.88

## 2019-01-01 ASSESSMENT — ENCOUNTER SYMPTOMS
HEADACHES: 0
FEVER: 0
CHILLS: 0
COUGH: 1
SHORTNESS OF BREATH: 0
COLOR CHANGE: 1
BACK PAIN: 1
NUMBNESS: 1
APPETITE CHANGE: 0
NUMBNESS: 0
NECK PAIN: 0
SHORTNESS OF BREATH: 0
NAUSEA: 0
VOMITING: 0
FEVER: 0
WOUND: 1
FEVER: 0
WEAKNESS: 0
ARTHRALGIAS: 1
ARTHRALGIAS: 1

## 2019-02-18 NOTE — TELEPHONE ENCOUNTER
Dr Stovall,   [facility]  Thanks,  Luz ALVARADO RN

## 2019-02-19 NOTE — TELEPHONE ENCOUNTER
I called and spoke to Fuad Brown, son, and patient has been experiencing worsening difficulty managing her anxiety.  Fuad was interested to know if potentially she might benefit from use of a benzodiazepine or another anxiolytic.  I will discuss this further with medical therapeutic management to see if there are any contraindications to starting benzodiazepine?    Man Stovall MD

## 2019-02-22 NOTE — TELEPHONE ENCOUNTER
I called and left a message for Fuad with his wife explaining that potentially increasing the dose of sertraline from  mg would have a the desired effect of helping with her agitation and would not incur any additional risk of increasing anxiety or falls.  We will hold off on starting any benzodiazepines at this moment.    Man Stovall MD

## 2019-02-25 NOTE — TELEPHONE ENCOUNTER
Reason for Call:  Other     Detailed comments: Please send order for Sertraline increase to [facility] Fax: [phone]    Phone Number Patient can be reached at: [facility] [phone]    Best Time: Any    Can we leave a detailed message on this number? YES    Call taken on 2/25/2019 at 9:09 AM by Rosa Hogan

## 2019-03-21 PROBLEM — F03.91 DEMENTIA WITH BEHAVIORAL DISTURBANCE, UNSPECIFIED DEMENTIA TYPE: Status: ACTIVE | Noted: 2019-01-01

## 2019-04-10 NOTE — TELEPHONE ENCOUNTER
BP this morning (8:15) 204/102  Meds given around 9am  Hour and half later (9:45) 192/88   Rechecked (10:45) 188/80    Pt has taken all of her medications- including diuretics and BP meds  Denies Headache   Denies chest pain  Denies SOB  Denies vision changes  Denies confusion    Advised to keep an eye on it, recheck around lunch, and to call back if SBP not below 180. Previous SBP runs between 150s-170s.   PCP any further advice? Per your request, there is not Ativan on her med list.    [place]: [phone] ask for MARIELY Pagan    Thank you,  Santos CARIAS RN

## 2019-04-10 NOTE — TELEPHONE ENCOUNTER
Reason for call:  Patient reporting a symptom    Symptom or request: high BP    Duration (how long have symptoms been present): has hypertension but noticed higher than normal values last few weeks    Have you been treated for this before? Yes    Additional comments:   192/88 today at 0945  204/102 today at 0815    Phone Number patient can be reached at:  [phone]    Best Time:  any    Can we leave a detailed message on this number:  YES    Call taken on 4/10/2019 at 10:16 AM by Sophia Keller    *transferred to triage

## 2019-05-20 NOTE — PROGRESS NOTES
[facility]  CLINIC PROGRESS NOTE    Subjective:  Dementia with behavioral disturbances   Kalli Brown presents to the clinic today for follow up of her ongoing concerns for dementia with behavioral disturbance.  She is accompanied by her sons who have been very involved in her care and have witnessed a decline in her mentation as well as worsening behavioral outbursts over the past 6-12 months.    Sertraline was increased up to 100 mg daily and she has been started on mirtazapine to help with sleep.     Her son notes that her mood has improved and although not perfect her general mental health is much better.   Memory has been stable.       Past medical history, medications, allergies, social history, family history reviewed and updated in [facility] as of 5/20/2019 .    ROS  CONSTITUTIONAL: no fatigue, no unexpected change in weight  SKIN: no worrisome rashes, no worrisome moles, no worrisome lesions  EYES: no acute vision problems or changes  ENT: no ear problems, no mouth problems, no throat problems  RESP: no significant cough, occasional shortness of breath  CV: no chest pain, no palpitations, no new or worsening peripheral edema  GI: no nausea, no vomiting, no constipation, no diarrhea  : having more assistance to use the bathrooms  MS: strength has been preserved.  Physical therapy has been fired.    PSYCHIATRIC: as above       Objective:  Vitals  /78 (BP Location: Left arm, Patient Position: Chair, Cuff Size: Adult Small)   Pulse 72   Temp 98.1  F (36.7  C) (Oral)   SpO2 95%   GEN: Alert Oriented x3 NAD  HEENT: Atraumatic, normocephalic, neck supple,   CV: RRR no murmurs or rubs  PULM: CTA no wheezes or crackles  ABD: Soft, nontender nondistended,   SKIN: No visible skin lesion or ulcerations  EXT:  no edema bilateral lower extremities, limited mobility - wheelchair bound  NEURO: Gait and station deferred,   PSYCH: Mood good, affect mood congruent    No images are attached to the  encounter.    No results found for this or any previous visit (from the past 24 hour(s)).    Assessment/Plan:  Patient Instructions   (N18.3) CKD (chronic kidney disease) stage 3, GFR 30-59 ml/min (H)  (primary encounter diagnosis)  Comment: We will check basic metabolic panel today and hemoglobin   Plan: Basic metabolic panel, CBC with platelets,         Hepatic panel            (Z23) Need for vaccination for measles  Comment: Check measles titer today  Plan: Rubeola Antibody IgG, Hepatic panel            Recommend shingrix vaccine       Follow up in 6 months    15 minutes spent with patient.  Over 50% of time counseling     Disclaimer: This note consists of symbols derived from keyboarding, dictation and/or voice recognition software. As a result, there may be errors in the script that have gone undetected. Please consider this when interpreting information found in this chart.    Man Stovall MD  [phone]

## 2019-05-20 NOTE — PATIENT INSTRUCTIONS
(N18.3) CKD (chronic kidney disease) stage 3, GFR 30-59 ml/min (H)  (primary encounter diagnosis)  Comment: We will check basic metabolic panel today and hemoglobin   Plan: Basic metabolic panel, CBC with platelets,         Hepatic panel            (Z23) Need for vaccination for measles  Comment: Check measles titer today  Plan: Rubeola Antibody IgG, Hepatic panel            Recommend shingrix vaccine

## 2019-05-20 NOTE — PROGRESS NOTES
Prior to injection, verified patient identity using patient's name and date of birth.  Due to injection administration, patient instructed to remain in clinic for 15 minutes  afterwards, and to report any adverse reaction to me immediately.    Screening Questionnaire for Adult Immunization    Are you sick today?   No   Do you have allergies to medications, food, a vaccine component or latex?   No   Have you ever had a serious reaction after receiving a vaccination?   No   Do you have a long-term health problem with heart disease, lung disease, asthma, kidney disease, metabolic disease (e.g. diabetes), anemia, or other blood disorder?   Yes   Do you have cancer, leukemia, HIV/AIDS, or any other immune system problem?   No   In the past 3 months, have you taken medications that affect  your immune system, such as prednisone, other steroids, or anticancer drugs; drugs for the treatment of rheumatoid arthritis, Crohn s disease, or psoriasis; or have you had radiation treatments?   No   Have you had a seizure, or a brain or other nervous system problem?   No   During the past year, have you received a transfusion of blood or blood     products, or been given immune (gamma) globulin or antiviral drug?   No   For women: Are you pregnant or is there a chance you could become        pregnant during the next month?   No   Have you received any vaccinations in the past 4 weeks?   No     Immunization questionnaire was positive for at least one answer.  Notified Dr. Stovall.        Per orders of Dr. Stovall, injection of Shingrix given by Alexis Hamilton. Patient instructed to remain in clinic for 15 minutes afterwards, and to report any adverse reaction to me immediately.       Screening performed by Alexis Hamilton on 5/20/2019 at 5:28 PM.

## 2019-05-22 NOTE — TELEPHONE ENCOUNTER
Can we call Kalli Brown's son Fuad and let him know that     Jason Mahan,    I have had the opportunity to review your recent results and an interpretation is as follows:  Your basic metabolic panel shows a slight rising creatinine which is consistent with your known chronic kidney disease  Your liver function tests are also elevated, I do not have an explanation for this and I would recommend rechecking this again at your convenience  Your complete blood counts show stable white blood cell and platelet count, but your hemoglobin is drifted down a little bit with increased MCV which may indicate a vitamin B12 or folic acid deficiency.  I recommend checking this again as well    Sincerely,  Man Stovall MD

## 2019-05-22 NOTE — RESULT ENCOUNTER NOTE
Jason Mahan,    I have had the opportunity to review your recent results and an interpretation is as follows:  Your basic metabolic panel shows a slight rising creatinine which is consistent with your known chronic kidney disease  Your liver function tests are also elevated, I do not have an explanation for this and I would recommend rechecking this again at your convenience  Your complete blood counts show stable white blood cell and platelet count, but your hemoglobin is drifted down a little bit with increased MCV which may indicate a vitamin B12 or folic acid deficiency.  I recommend checking this again as well    Sincerely,  Man Stovall MD

## 2019-05-23 NOTE — TELEPHONE ENCOUNTER
Fuad called back - given results below. He has no questions and is agreeable to plan (states he is a pediatric ER doctor).     Cintia LABOY RN

## 2019-05-24 NOTE — RESULT ENCOUNTER NOTE
Jason Mahan,    I have had the opportunity to review your recent results and an interpretation is as follows:  You are immune to Measles      Sincerely,  Man Stovall MD

## 2019-06-10 NOTE — TELEPHONE ENCOUNTER
TO PCP    See below pt's med list has irbesartan-hydrochlorothiazide 300-12.5mg - 1 tab daily    Per below pt's insurance won't cover this medication and pt's family requesting alternative Rx    Please advise     Cintia LABOY RN

## 2019-06-12 NOTE — TELEPHONE ENCOUNTER
LVM asking Fuad to review [employer] message on pt and call back if any questions/concerns    Cintia LABOY RN

## 2019-07-19 NOTE — TELEPHONE ENCOUNTER
HPI     Post-op Evaluation      Additional comments: 1 day s/p phaco iol OS 7/18              Comments     Pt state no pain or irritation OS and seeing clearer. + has a little brow   ache.     Gtts: Prednisolone, Diclofenac, Moxifloxacin QID OS - Installed 1 drop   each into OS at 9;25AM.          Last edited by Cheryle Quintana on 7/19/2019  9:26 AM. (History)        ROS     Negative for: Constitutional, Gastrointestinal, Neurological, Skin,   Genitourinary, Musculoskeletal, HENT, Endocrine, Cardiovascular, Eyes,   Respiratory, Psychiatric, Allergic/Imm, Heme/Lymph    Last edited by Gabe Moses Jr., MD on 7/19/2019  9:30 AM. (History)        Assessment /Plan     For exam results, see Encounter Report.    Status post cataract extraction and insertion of intraocular lens of left eye    Prednisolone acetate (pink or white top drop) 1 drop 4x daily left eye; shake well before using drop  Vigamox 1 drop 4x daily left eye (tan top)  Diclofenac 1 drop 4x daily left eye (gray top)  No bending no lifting  Keep head elevated when laying down  Keep dry   F/u 1 week                    Yes, OK to discontinue myrbetriq    As for letter, this is not something that I am qualified to say.  She will need to have formal testing from neurologist to determine this.  Referral to [facility] was placed      Man Stovall MD

## 2019-09-13 NOTE — TELEPHONE ENCOUNTER
"Requested Prescriptions   Pending Prescriptions Disp Refills     furosemide (LASIX) 80 MG tablet 90 tablet 3     Sig: Take 1 tablet (80 mg) by mouth daily  Last Written Prescription Date:  7/9/18  Last Fill Quantity: 90 tab,  # refills: 3   Last office visit: 5/20/2019 with prescribing provider:  Wilman   Future Office Visit:   Next 5 appointments (look out 90 days)    Sep 23, 2019  9:30 AM CDT  [facility] [address] [facility] [address]  [phone]             Diuretics (Including Combos) Protocol Failed - 9/12/2019  2:57 PM        Failed - Blood pressure under 140/90 in past 12 months     BP Readings from Last 3 Encounters:   05/20/19 148/78   11/26/18 153/67   11/18/18 159/80                 Failed - Normal serum creatinine on file in past 12 months     Recent Labs   Lab Test 05/20/19  1715   CR 1.17*              Passed - Recent (12 mo) or future (30 days) visit within the authorizing provider's specialty     Patient had office visit in the last 12 months or has a visit in the next 30 days with authorizing provider or within the authorizing provider's specialty.  See \"Patient Info\" tab in inbasket, or \"Choose Columns\" in Meds & Orders section of the refill encounter.              Passed - Medication is active on med list        Passed - Patient is age 18 or older        Passed - No active pregancy on record        Passed - Normal serum potassium on file in past 12 months     Recent Labs   Lab Test 05/20/19  1715   POTASSIUM 5.0                    Passed - Normal serum sodium on file in past 12 months     Recent Labs   Lab Test 05/20/19  1715                 Passed - No positive pregnancy test in past 12 months        latanoprost (XALATAN) 0.005 % ophthalmic solution       Sig: Place 1 drop into both eyes At Bedtime  HISTORICAL      Last Written Prescription Date:  NA  Last Fill Quantity: NA,   # refills: NA  Last " "Office Visit: 5/20/19 Wilman  Future Office visit:    Next 5 appointments (look out 90 days)    Sep 23, 2019  9:30 AM CDT  PHYSICAL with Man [facility]all MD  [facility] [address] [facility] [address]  [phone]           Routing refill request to provider for review/approval because:  Drug not on the [facility] or [facility] refill protocol or controlled substance         There is no refill protocol information for this order        amLODIPine (NORVASC) 10 MG tablet 30 tablet 11     Sig: Take 1 tablet (10 mg) by mouth daily  Last Written Prescription Date:  8/3/18  Last Fill Quantity: 30 TAB,  # refills: 11   Last office visit: 5/20/2019 with prescribing provider:  Wilman   Future Office Visit:   Next 5 appointments (look out 90 days)    Sep 23, 2019  9:30 AM CDT  PHYSICAL with Man Stovall MD  [facility] [address] [facility] [address]  [phone]             Calcium Channel Blockers Protocol  Failed - 9/12/2019  2:57 PM        Failed - Blood pressure under 140/90 in past 12 months     BP Readings from Last 3 Encounters:   05/20/19 148/78   11/26/18 153/67   11/18/18 159/80                 Failed - Normal serum creatinine on file in past 12 months     Recent Labs   Lab Test 05/20/19  1715   CR 1.17*             Passed - Recent (12 mo) or future (30 days) visit within the authorizing provider's specialty     Patient had office visit in the last 12 months or has a visit in the next 30 days with authorizing provider or within the authorizing provider's specialty.  See \"Patient Info\" tab in inbasket, or \"Choose Columns\" in Meds & Orders section of the refill encounter.              Passed - Medication is active on med list        Passed - Patient is age 18 or older        Passed - No active pregnancy on record        Passed - No positive pregnancy test in past 12 months        calcium citrate-vitamin " "D (CITRACAL) 315-250 MG-UNIT TABS per tablet 180 tablet 3     Sig: Take 1 tablet by mouth 2 times daily -Take with food  Last Written Prescription Date:  6/15/18  Last Fill Quantity: 180 TAB,  # refills: 3   Last office visit: 5/20/2019 with prescribing provider:  Wilman   Future Office Visit:   Next 5 appointments (look out 90 days)    Sep 23, 2019  9:30 AM CDT  PHYSICAL with Man Stovall MD  [facility]) [address] [facility] [address]  [phone]             Vitamin Supplements (Adult) Protocol Passed - 9/12/2019  2:57 PM        Passed - High dose Vitamin D not ordered        Passed - Recent (12 mo) or future (30 days) visit within the authorizing provider's specialty     Patient had office visit in the last 12 months or has a visit in the next 30 days with authorizing provider or within the authorizing provider's specialty.  See \"Patient Info\" tab in inbasket, or \"Choose Columns\" in Meds & Orders section of the refill encounter.              Passed - Medication is active on med list           "

## 2019-09-23 NOTE — PROGRESS NOTES
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  She has been taking the diuretics in the morning, but nos that on occasion she needs help to get to the bathroom on time which has created a few problems.  She has had worse troubles in the evening.  She has been drinking fluids up through dinner, but not after.   Blood pressure has been in the 150's/80's at home usually.     Past medical history, medications, allergies, social history, family history reviewed and updated in [facility] as of 9/23/2019 .    ROS  CONSTITUTIONAL: no fatigue, no unexpected change in weight  SKIN: no worrisome rashes, no worrisome moles, no worrisome lesions  EYES: no acute vision problems or changes  ENT: no ear problems, no mouth problems, no throat problems  RESP: no significant cough, occasional shortness of breath  CV: no chest pain, no palpitations, no peripheral edema  GI: no nausea, no vomiting, + constipation, no diarrhea  : no frequency, no dysuria, no hematuria  MS: generalized weakness, ongoing shoulder rotator cuff arthropathy - unable to walk  PSYCHIATRIC: no changes in mood or affect - family notices that she is still having some depression and also some difficulty with cognitive decline - but behavior has been improved      Objective:  Vitals  BP (!) 150/80   Pulse 55   SpO2 96%   GEN: Alert Oriented x3 NAD  HEENT: Atraumatic, normocephalic, neck supple,    CV: RRR no murmurs or rubs  PULM: CTA no wheezes or crackles  ABD: Soft, nontender nondistended,    SKIN: No visible skin lesion or ulcerations  EXT: no edema bilateral lower extremities  NEURO: Gait and station deferred - wheelchair dependent, No focal neurologic deficits  PSYCH: Mood good, affect mood congruent    No images are attached to the encounter.    No results found for this or any previous visit (from the past 24  hour(s)).    Assessment/Plan:  Patient Instructions   (I10) Benign essential hypertension  Comment: blood pressure is elevated, but stable.  We will continue with current blood pressure medications of Valsartan, hydrochlorothiazide, amlodipine and reduce dose of lasix to 40 mg and monitor closely  Plan: furosemide (LASIX) 40 MG tablet, CBC with         platelets, Basic metabolic panel, aspirin         (ASPIRIN LOW DOSE) 81 MG chewable tablet            (F51.01) Primary insomnia  Comment: Conitnue mirtazapine  Plan: mirtazapine (REMERON) 7.5 MG tablet            (D50.9) Iron deficiency anemia, unspecified iron deficiency anemia type  Comment: Continue oral iron   Plan: ferrous sulfate (FEROSUL) 325 (65 Fe) MG tablet            (E55.9) Vitamin D deficiency  Comment:   Plan: Vitamin D, Cholecalciferol, 1000 units TABS            (F41.1) DAVID (generalized anxiety disorder)  Comment: Continue sertraline  Plan: sertraline (ZOLOFT) 100 MG tablet            (N18.3) CKD (chronic kidney disease) stage 3, GFR 30-59 ml/min (H)  Comment: check basic metabolic panel and complete blood counts   Plan: valsartan-hydrochlorothiazide (DIOVAN HCT)         320-12.5 MG tablet               Follow up in 6 months    Disclaimer: This note consists of symbols derived from keyboarding, dictation and/or voice recognition software. As a result, there may be errors in the script that have gone undetected. Please consider this when interpreting information found in this chart.    Man Stovall MD  [phone]

## 2019-09-23 NOTE — PATIENT INSTRUCTIONS
(I10) Benign essential hypertension  Comment: blood pressure is elevated, but stable.  We will continue with current blood pressure medications of Valsartan, hydrochlorothiazide, amlodipine and reduce dose of lasix to 40 mg and monitor closely  Plan: furosemide (LASIX) 40 MG tablet, CBC with         platelets, Basic metabolic panel, aspirin         (ASPIRIN LOW DOSE) 81 MG chewable tablet            (F51.01) Primary insomnia  Comment: Conitnue mirtazapine  Plan: mirtazapine (REMERON) 7.5 MG tablet            (D50.9) Iron deficiency anemia, unspecified iron deficiency anemia type  Comment: Continue oral iron   Plan: ferrous sulfate (FEROSUL) 325 (65 Fe) MG tablet            (E55.9) Vitamin D deficiency  Comment:   Plan: Vitamin D, Cholecalciferol, 1000 units TABS            (F41.1) DAVID (generalized anxiety disorder)  Comment: Continue sertraline  Plan: sertraline (ZOLOFT) 100 MG tablet            (N18.3) CKD (chronic kidney disease) stage 3, GFR 30-59 ml/min (H)  Comment: check basic metabolic panel and complete blood counts   Plan: valsartan-hydrochlorothiazide (DIOVAN HCT)         320-12.5 MG tablet

## 2019-09-23 NOTE — RESULT ENCOUNTER NOTE
Jason Mahan,    I have had the opportunity to review your recent results and an interpretation is as follows:  Your follow-up metabolic panel shows stable creatinine and potassium.  We can continue with your current blood pressure meds with noted change of reduction of Lasix to 40 mg and monitor closely for recurrence of edema.  Your complete blood counts show stable anemia    Sincerely,  Man Stovall MD

## 2019-10-05 NOTE — ED NOTES
"[facility]  ED Nurse Handoff Report    ED Chief complaint: Fall      ED Diagnosis:   Final diagnoses:   Fall, initial encounter   Closed stable burst fracture of first lumbar vertebra, initial encounter (H)   Dehydration   Back pain   Chronic renal insufficiency   Prerenal azotemia   Anemia   Behavioral change       Code Status: To be addressed by admitting MD.     Allergies:   Allergies   Allergen Reactions     Atorvastatin        Activity level - Baseline/Home:  Stand and pivot with assistance   Activity Level - Current:   Total Care    Patient's Preferred language: English   Needed?: No    Isolation: No  Infection: Not Applicable  Bariatric?: No    Vital Signs:   Vitals:    10/05/19 1219 10/05/19 1330 10/05/19 1335 10/05/19 1355   BP: (!) 175/92 (!) 175/82     Resp: 16      Temp: 98.4  F (36.9  C)      TempSrc: Oral      SpO2: 97%  95% 95%   Weight: 46.7 kg (103 lb)      Height: 1.651 m (5' 5\")          Cardiac Rhythm: ,        Pain level: 0-10 Pain Scale: 9    Is this patient confused?: No   Does this patient have a guardian?  No         If yes, is there guardianship documents in the Epic \"Code/ACP\" activity?  N/A         Guardian Notified?  N/A  [place] - Suicide Severity Rating Scale Completed?  Yes  If yes, what color did the patient score?  White    Patient Report: Initial Complaint: Pt presents to the ED for evaluation of right leg pain.   Focused Assessment: Pt has a mechanical fall at her assisted living this morning. No LOC. Pt denies being on blood thinners. Pt denies hitting her head. Pt has pain in her right leg and back following the fall. Pt normally stand and pivots out of a wheelchair for transferring. Pt has not ambulated since being in the ED. Pt is alert and oriented x4. IV fluid bolus running. Pt is hypertensive in ED 170s/80s.   Tests Performed: Labs, UA, imaging  Abnormal Results:   Treatments provided: IV fluids     Family Comments: Two sons at bedside in ED. "     OBS brochure/video discussed/provided to patient/family: Yes              Name of person given brochure if not patient: NA              Relationship to patient: NA    ED Medications:   Medications   0.9% sodium chloride BOLUS (1,000 mLs Intravenous New Bag 10/5/19 4267)       Drips infusing?:  No    For the majority of the shift this patient was Green.   Interventions performed were NA.    Severe Sepsis OR Septic Shock Diagnosis Present: No    To be done/followed up on inpatient unit:  Monitor and pain control     ED NURSE PHONE NUMBER: [id number]

## 2019-10-05 NOTE — PLAN OF CARE
DATE & TIME: 10/5/2019 9317-6707    Cognitive Concerns/ Orientation : A+Ox2   BEHAVIOR & AGGRESSION TOOL COLOR: Green; calm and cooperative  CIWA SCORE: NA   ABNL VS/O2: RA  MOBILITY: Bedpan due to leg and back injury  PAIN MANAGMENT: acetaminophen and Tramadol  DIET: Reg  BOWEL/BLADDER: Incontinent of bowel and bladder  ABNL LAB/BG: NA  DRAIN/DEVICES: NA  TELEMETRY RHYTHM: NA  SKIN: Large hematoma on RLE  TESTS/PROCEDURES: None  D/C DAY/GOALS/PLACE: Neuro to consult tomorrow morning; Dr Collado  OTHER IMPORTANT INFO: Previous L1 fracture and scar on lower back

## 2019-10-05 NOTE — PHARMACY-ADMISSION MEDICATION HISTORY
Admission medication history interview status for the 10/5/2019  admission is complete. See EPIC admission navigator for prior to admission medications     Medication history source reliability:Good    Actions taken by pharmacist (provider contacted, etc): PTA med list updated per medication list from [facility] [place] [facility], last doses verified per Anny from the facility.      Additional medication history information not noted on PTA med list :None    Medication reconciliation/reorder completed by provider prior to medication history? No    Time spent in this activity: 15 minutes    Prior to Admission medications    Medication Sig Last Dose Taking? Auth Provider   acetaminophen (TYLENOL) 500 MG tablet Take 2 tablets (1,000 mg) by mouth 3 times daily 10/5/2019 at 0800 Yes Man Stovall MD   amLODIPine (NORVASC) 10 MG tablet Take 1 tablet (10 mg) by mouth daily 10/5/2019 at 0800 Yes Man Stovall MD   aspirin (ASPIRIN LOW DOSE) 81 MG chewable tablet 1 TAB BY MOUTH DAILY (DX:ANTICOAGULATION) 10/5/2019 at 0800 Yes Man Stovall MD   calcium citrate-vitamin D (CITRACAL) 315-250 MG-UNIT TABS per tablet Take 1 tablet by mouth 2 times daily -Take with food 10/5/2019 at 0800 Yes Man Stovall MD   carBAMazepine (TEGRETOL) 200 MG tablet Take 200 mg by mouth every morning 10/5/2019 at 0900 Yes Unknown, Entered By History   carBAMazepine (TEGRETOL) 200 MG tablet Take 300 mg by mouth every evening 10/5/2019 at 2000 Yes Unknown, Entered By History   docusate sodium (COLACE) 250 MG capsule Take 1 capsule (250 mg) by mouth daily as needed for constipation Past Month at PRN Yes Man Stovall MD   ferrous sulfate (FEROSUL) 325 (65 Fe) MG tablet Take 1 tablet (325 mg) by mouth 2 times daily (with meals) 10/5/2019 at 0800 Yes Man Stovall MD   furosemide (LASIX) 40 MG tablet Take 1 tablet (40 mg) by mouth daily 10/5/2019 at 0800 Yes Man Stovall MD    latanoprost (XALATAN) 0.005 % ophthalmic solution Place 1 drop into both eyes At Bedtime 10/4/2019 at 2000 Yes Man Stovall MD   menthol (HALLS COUGH DROPS) 7 MG LOZG Take 1 lozenge (7 mg) by mouth every hour as needed for cough Past Month at PRN Yes Man Stovall MD   mirtazapine (REMERON) 7.5 MG tablet Take 1 tablet (7.5 mg) by mouth At Bedtime 10/4/2019 at 2000 Yes Man Stovall MD   polyethylene glycol (MIRALAX/GLYCOLAX) packet Take 1 packet by mouth daily 10/5/2019 at 0800 Yes Unknown, Entered By History   sertraline (ZOLOFT) 100 MG tablet Take 1 tablet (100 mg) by mouth daily 10/5/2019 at 0800 Yes Man Stovall MD   timolol maleate (TIMOPTIC) 0.5 % ophthalmic solution Place 1 drop into the right eye daily 10/5/2019 at 0800 Yes Unknown, Entered By History   traMADol (ULTRAM) 50 MG tablet Take 50 mg by mouth every 6 hours as needed for severe pain Past Month at PRN Yes Unknown, Entered By History   valsartan-hydrochlorothiazide (DIOVAN HCT) 320-12.5 MG tablet Take 1 tablet by mouth daily 10/5/2019 at 0800 Yes Man Stovall MD   Vitamin D, Cholecalciferol, 1000 units TABS Take 1 tablet (1,000 Units) by mouth daily 10/5/2019 at 0800 Yes Man Stovall MD   order for DME Equipment being ordered: Incentive Spirometer   Man Stovall MD

## 2019-10-05 NOTE — ED PROVIDER NOTES
History     Chief Complaint:  Fall       HPI   Kalli Brown is a 89 year old female who presents to the ED following a fall.  The patient lives in assisted living.  Per the patient, she was in her wheelchair, reaching up to get papers to clean up her living area when she had a mechanical fall off the wheelchair.  Here, she notes pain to her right distal extremity.  She denies pain in the remainder of her extremities.  She denies hitting her head or any loss of consciousness.  She denies any fevers, chills, chest pain, shortness of breath, neck pain, back pain, numbness/tingling, weakness, or urinary symptoms.  Per nursing staff, son called to note that patient has had increased confusion and has been more combative than normal over the last 4 days.  She has been resisting going to see her PCP.  He is concerned that the symptoms led to her fall.    Allergies:  Atorvastatin     Medications:    No current outpatient medications on file.      Past Medical History:    Past Medical History:   Diagnosis Date     Anemia      Deafness      Dislocated shoulder      Edema      Glaucoma      Hypertension      Multiple myeloma (H)      Proteinuria      Shoulder dislocation      Trigeminal neuralgia        Patient Active Problem List    Diagnosis Date Noted     Dementia with behavioral disturbance, unspecified dementia type (H) 03/21/2019     Priority: Medium     Closed compression fracture of L1 lumbar vertebra with routine healing, subsequent encounter 11/09/2018     Priority: Medium     Benign essential hypertension 07/06/2017     Priority: Medium     DAVID (generalized anxiety disorder) 07/05/2017     Priority: Medium     Chronic pain 07/29/2016     Priority: Medium     Light chain nephropathy 11/17/2015     Priority: Medium     Nontraumatic shoulder pain, right 10/23/2015     Priority: Medium     CKD (chronic kidney disease) stage 3, GFR 30-59 ml/min (H) 10/14/2015     Priority: Medium     MPGN  (membranoproliferative glomerulonephritis) 10/14/2015     Priority: Medium     Dr. Moore - [facility]       Anemia of chronic kidney failure, stage 3 (moderate) (H) 10/14/2015     Priority: Medium     Iron deficiency anemia 10/14/2015     Priority: Medium     Diagnosis updated by automated process. Provider to review and confirm.       Chronic constipation 10/14/2015     Priority: Medium     Trigeminal neuralgia 10/14/2015     Priority: Medium     Compression fracture of lumbar vertebra with routine healing 10/14/2015     Priority: Medium     Cataract 10/14/2015     Priority: Medium     Glaucoma 10/14/2015     Priority: Medium     S/P total hip arthroplasty 10/14/2015     Priority: Medium     Balance problem 10/14/2015     Priority: Medium     Left shoulder pain 07/22/2015     Priority: Medium     Failure to thrive in adult 02/11/2015     Priority: Medium     Fall 02/11/2015     Priority: Medium        Past Surgical History:    Past Surgical History:   Procedure Laterality Date     EYE SURGERY       GYN SURGERY       ORTHOPEDIC SURGERY       PHACOEMULSIFICATION CLEAR CORNEA WITH STANDARD INTRAOCULAR LENS IMPLANT  6/2/2014    Procedure: PHACOEMULSIFICATION CLEAR CORNEA WITH STANDARD INTRAOCULAR LENS IMPLANT;  Surgeon: Doe Liu MD;  Location: [facility]        Family History:    family history is not on file.    Social History:   reports that she has never smoked. She has never used smokeless tobacco. She reports that she does not drink alcohol or use drugs.    PCP: Man Stovall     Review of Systems   Constitutional: Negative for chills and fever.   Respiratory: Negative for shortness of breath.    Cardiovascular: Negative for chest pain.   Genitourinary: Negative.    Musculoskeletal: Positive for arthralgias and back pain. Negative for neck pain.   Neurological: Negative for weakness, numbness and headaches.   All other systems reviewed and are negative.      Physical Exam     Patient Vitals for  "the past 24 hrs:   BP Temp Temp src Pulse Heart Rate Resp SpO2 Height Weight   10/05/19 1606 (!) 187/82 97.5  F (36.4  C) Oral -- 59 16 95 % 1.651 m (5' 5\") 45.9 kg (101 lb 4.8 oz)   10/05/19 1515 (!) 193/98 -- -- 66 -- -- -- -- --   10/05/19 1400 -- -- -- -- -- -- 94 % -- --   10/05/19 1355 -- -- -- -- -- -- 95 % -- --   10/05/19 1335 -- -- -- -- -- -- 95 % -- --   10/05/19 1330 (!) 175/82 -- -- -- -- -- -- -- --   10/05/19 1219 (!) 175/92 98.4  F (36.9  C) Oral -- 68 16 97 % 1.651 m (5' 5\") 46.7 kg (103 lb)        Physical Exam  Constitutional: Pleasant. Cooperative. Hard of hearing.  Eyes: Pupils equally round and reactive  HENT: No scalp hematoma. No scalp tenderness. No bony step-off or crepitus. No facial bone tenderness or instability. No hemotympanum. No periorbital ecchymosis or Howard signs. Oropharynx is normal with moist mucus membranes. No evidence for intraoral injury.  Cardiovascular: Regular rate and rhythm and without murmurs.  Respiratory: Normal respiratory effort, lungs are clear bilaterally.  GI: Abdomen is soft, non-tender, non-distended. No guarding, rebound, or rigidity.  Musculoskeletal: No midline cervical, thoracic, or lumbar tenderness. Normal painless ROM of the neck. No clavicular tenderness. No upper extremity tenderness. Tenderness to palpation of mid fibula on R lower extremity. Remainder of lower extremity exam unremarkable. Normal ROM of extremities. No tenderness with compression of the rib cage or pelvis.  Skin: No rashes. No lacerations or abrasions noted. Marked contusion to lateral mid fibula.  Neurologic: Cranial nerves II-XII intact, normal cognition, no focal deficits. Alert and oriented x 3. Normal  strength. Normal leg raise. Sensation to light touch intact throughout all 4 extremities. 5/5 strength with dorsiflexion and plantarflexion bilaterally. GCS 15  Psychiatric: Normal affect.  Nursing notes and vital signs reviewed.      Emergency Department Course "     Imaging:  Lumbar spine CT w/o contrast   Final Result   IMPRESSION:   1. Acute burst type fracture through L1 vertebral body with minimal   posterior protrusion contributing to some mild to moderate central   canal stenosis in conjunction with some degenerative changes.   2. Large lucency with some sclerotic borders involving the fractured   L1 vertebral body. This could be due to a chronic Schmorl's node,   although it is difficult to exclude pathologic lesion such as   metastatic deposit.   3. Diffuse osteopenia.   4. Multilevel degenerative disc and facet disease with multilevel mild   to moderate central canal stenoses.   5. Results called to and discussed with the referring clinician at   1420 on 10/5/2019.      DENY HUANG MD      CT Head w/o Contrast   Final Result   IMPRESSION: Chronic changes. No evidence for intracranial hemorrhage   or any acute infarct.      DENY HUANG MD      XR Tibia & Fibula Right 2 Views   Final Result   IMPRESSION: Focal soft tissue swelling in the anterior lateral aspect   of the mid to distal fibular diaphyseal region. No fractures are   evident. Osteopenia.      JESSICA MALCOLM MD           Laboratory:  Labs Ordered and Resulted from Time of ED Arrival Up to the Time of Departure from the ED   CBC WITH PLATELETS DIFFERENTIAL - Abnormal; Notable for the following components:       Result Value    RBC Count 2.82 (*)     Hemoglobin 9.4 (*)     Hematocrit 28.1 (*)     MCH 33.3 (*)     All other components within normal limits   BASIC METABOLIC PANEL - Abnormal; Notable for the following components:    Chloride 110 (*)     Anion Gap 1 (*)     Glucose 132 (*)     Urea Nitrogen 54 (*)     Creatinine 1.08 (*)     GFR Estimate 45 (*)     GFR Estimate If Black 52 (*)     All other components within normal limits   ROUTINE UA WITH MICROSCOPIC - Abnormal; Notable for the following components:    Blood Urine Trace (*)     Protein Albumin Urine 100 (*)     RBC Urine 5 (*)     Hyaline  Casts 4 (*)     All other components within normal limits      Interventions:  NS 1L IV    Emergency Department Course:  Past medical records, nursing notes, and vitals reviewed.  I performed an exam of the patient and obtained history, as documented above.  Ordered the above labs and imaging.  I ordered the above intervention.  Discussed results with patient and sons.  Discussed patient with hospitalist.  Patient admitted to the hospital.    Impression & Plan      Medical Decision Making:  Kalli Brown is a 89 year old female who presents to the ED following a fall.  This was a mechanical fall.  She noted pain only to her right distal extremity on my evaluation.  She denies hitting her head or LOC.  On Dr. Alba's evaluation, patient also noted lower back pain.  Son reported some behavioral changes recently as well.  See HPI as above.  On initial evaluation, patient was hypertensive, however other vital signs were within normal limits.  Physical exam as above.  Initial work-up included x-ray of right lower extremity, CT of the head, basic lab work, and UA.  Patient had mild anemia, however this is chronic for her.  No evidence for UTI.  CT the head obtained as patient was unclear whether she hit her head.  Thankfully, this is negative.  X-ray of R tib/fib reveals soft tissue swelling without evidence for fracture.  On Dr. Alba's evaluation, patient did report some lower back pain.  CT of the lumbar spine was thus obtained and revealed acute burst type fracture through the L1 vertebral body.  There was also a large lucency with some sclerotic borders involving the L1 vertebral body.  This was discussed with the patient's sons.  Advised admission to the hospital for further evaluation and consultation by orthopedic spine.  Sons were also concerned about needing higher level of care for patient, and this was discussed with the hospitalist and can be further addressed.  All questions answered.  The patient  was admitted to the hospital in stable condition.      Diagnosis:    ICD-10-CM    1. Fall, initial encounter W19.XXXA    2. Closed stable burst fracture of first lumbar vertebra, initial encounter (H) S32.011A    3. Dehydration E86.0    4. Back pain M54.9    5. Chronic renal insufficiency N18.9    6. Prerenal azotemia R79.89    7. Anemia D64.9    8. Behavioral change R46.89         Discharge Medications:     Medication List      ASK your doctor about these medications    * carBAMazepine 200 MG tablet  Commonly known as:  TEGretol  Ask about: Which instructions should I use?     * carBAMazepine 200 MG tablet  Commonly known as:  TEGretol  Ask about: Which instructions should I use?     polyethylene glycol packet  Commonly known as:  MIRALAX/GLYCOLAX  Ask about: Which instructions should I use?         * This list has 2 medication(s) that are the same as other medications prescribed for you. Read the directions carefully, and ask your doctor or other care provider to review them with you.                 10/5/2019   Steve Alba MD     Emergency Department Attending Supervision Note    10/5/2019  3:27 PM      I evaluated this patient in conjunction with Jordy Ko PA-C    Briefly, the patient presented with a history of fall as noted above.  There has been general decline according to family members as well.  Patient presents today complaining of back pain and discomfort involving her right distal shin.  She does not recall if there was a significant head injury component.    My exam:  General: Resting uncomfortably on the gurney    She is hard of hearing    Complains of back pain at this time  Head:  The scalp, face, and head appear normal    There are no signs of scalp trauma or injury to the head  Eyes:  The pupils are normal    Conjunctivae and sclera appear normal  ENT:    The nose is normal    Ears/pinnae are normal  Neck:  Normal range of motion  Lungs: The lungs are clear  GI:  The abdomen is benign and  non-surgical  Heart:  Regular rate, normal rhythm, no murmur  Skin:  There is a contusion and hematoma involving the right anterior distal shin.  Neuro: Speech is normal and fluent.  No focal deficits.  GCS is 15.    Patient has significant back tenderness in the upper lumbar area  Psych:  Awake. Alert. Appropriate interactions    MDM:  This patient presents after a fall as noted above.  She suffered an injury to the right distal tibia which appears to be a significant contusion and hematoma.  There is no clear fracture seen in this location.  She had no significant head injury but given the possibility of minor head injury a CT scan was performed and is nonacute.  Patient complained of back pain and did have lumbar tenderness.  She is diagnosed on CT scan with a L1 burst fracture.  She does not have any significant neurologic impairment in the lower extremities at this time.  She has good motor strength and sensation throughout.  Patient is noted on laboratories to have chronic anemia and she also has chronic renal insufficiency with an element of prerenal insufficiency and intravascular volume depletion.  Hydration was initiated.  The patient will be placed in observation status with the hospitalist with orthopedic consultation regarding her spine fracture.      My impression/diagnosis is:    L1 burst fracture  Anemia  Chronic and prerenal failure  Dehydration  Right pretibial contusion/hematoma    Steve Alba MD  Emergency Physician         Steve Alba MD  10/06/19 0910

## 2019-10-05 NOTE — ED NOTES
Bed: ED02  Expected date:   Expected time:   Means of arrival:   Comments:  Julianne 2 hematoma on leg 89 f

## 2019-10-05 NOTE — PROVIDER NOTIFICATION
MD Notification    Notified Person: MD    Notified Person Name:  Gian    Notification Date/Time: 10/5/2019 1727    Notification Interaction: Sent text page     Purpose of Notification: Blood pressure over 180 on two readings. Family states this is a normal occurrence.     Orders Received:    Comments: MD called back at 1740 and stated he is going to order BP meds

## 2019-10-05 NOTE — ED TRIAGE NOTES
Pt had mechanical fall this morning- pt uses wheelchair to get around- stands and pivots to bathroom, today was reaching to open curtains and the cushion of the wheelchair slipped and pt fell out of wheelchair, no LOC, no blood thinners, denies hitting head, A & O 4, c/o pain on right lower leg- pt has hematoma present on lateral right lower leg with swelling

## 2019-10-05 NOTE — PROGRESS NOTES
RECEIVING UNIT ED HANDOFF REVIEW    ED Nurse Handoff Report was reviewed by: Abdi Montelongo, RN on October 5, 2019 at 3:35 PM

## 2019-10-06 PROBLEM — S32.001A BURST FRACTURE OF LUMBAR VERTEBRA (H): Status: ACTIVE | Noted: 2019-01-01

## 2019-10-06 NOTE — PLAN OF CARE
Discharge Planner PT   Patient plan for discharge: Pt does not want to go back to the [facility]. Per discussion with son, return to [facility] is the goal.   Current status: PT eval completed, treatment initiated. Pt is a 89 yr old female admitted after falling out of the WC and increased delirium. Pt is [place] with hx of behavioral disturbance with dementia. Pt lives in an senior care and is WC bound for mobility. Pt needs assistance with all ADL's, bed mobility and tranfers. Family has pt set up for 24 hr assistance as needed. However per discussion with son, pt is very inpatient and if the help doesn't arrive within a minute, pt will try and get up by herself resulting in falls.   Currently pt is at min assist x 1 with bed mobility with max cues and edu on log rolling. Pt is able to sit at EOB x 10 min with SBA. Pt required mod assist x 1 with STS with max verbal cues for anterior weight shift as pt tends to push backwards. Pt was assisted back to bed with min assist x 1 and scooted to the HOB using celing lift. Per discussion with son, family is considering memory care [facility], however afraid that pt will not be open to this.   Barriers to return to prior living situation: None anticipated.   Recommendations for discharge: Return to [facility] with assist for all mobility  Rationale for recommendations: Pt will continue to need 1 person assist for bed mobility, transfers and ADL's as before.        Entered by: Rubio Gonsales 10/06/2019 3:23 PM

## 2019-10-06 NOTE — ED AVS SNAPSHOT
Emergency Department  [address] [place] [id number]  Phone:  [phone]  Fax:  [phone]                                    Kalli Brown   MRN: [id number]    Department:   Emergency Department   Date of Visit:  10/6/2019           After Visit Summary Signature Page    I have received my discharge instructions, and my questions have been answered. I have discussed any challenges I see with this plan with the nurse or doctor.    ..........................................................................................................................................  Patient/Patient Representative Signature      ..........................................................................................................................................  Patient Representative Print Name and Relationship to Patient    ..................................................               ................................................  Date                                   Time    ..........................................................................................................................................  Reviewed by Signature/Title    ...................................................              ..............................................  Date                                               Time          22EPIC Rev 08/18

## 2019-10-06 NOTE — UTILIZATION REVIEW
"  Admission Status; Secondary Review Determination         Under the authority of the Utilization Management Committee, the utilization review process indicated a secondary review on the above patient.  The review outcome is based on review of the medical records, discussions with staff, and applying clinical experience noted on the date of the review.          (x) Observation Status Appropriate - This patient does not meet hospital inpatient criteria and is placed in observation status. If this patient's primary payer is Medicare and was admitted as an inpatient, Condition Code 44 should be used and patient status changed to \"observation\".     RATIONALE FOR DETERMINATION   89-year-old  female who is a full code, who is hard of hearing, wheelchair bound, who lives in an assisted living facility, and presents to [facility] after a mechanical fall. Patient has dementia and has increased aggression and confusion, pain is controlled with Tylenol.  She was seen by psychiatry who recommended continuing Zoloft and adding Remeron at bedtime as needed for agitation.  No other finding or medical condition requiring prolonged inpatient care, patient might need placement. The severity of illness, intensity of service provided, expected LOS and risk for adverse outcome make the care appropriate for further observation; however, doesn't meet criteria for hospital inpatient admission. Dr. Springer  notified of this determination.    This document was produced using voice recognition software.      The information on this document is developed by the utilization review team in order for the business office to ensure compliance.  This only denotes the appropriateness of proper admission status and does not reflect the quality of care rendered.         The definitions of Inpatient Status and Observation Status used in making the determination above are those provided in the CMS Coverage Manual, Chapter 1 and " Chapter 6, section 70.4.      Sincerely,     ANRDE ABRAHAM MD    System Medical Director  Utilization Management  [facility].

## 2019-10-06 NOTE — PLAN OF CARE
[facility]      OUTPATIENT PHYSICAL THERAPY EVALUATION  PLAN OF TREATMENT FOR OUTPATIENT REHABILITATION  (COMPLETE FOR INITIAL CLAIMS ONLY)  Patient's Last Name, First Name, M.I.  YOB: 1929  Kalli Brown                        Provider's Name  [facility] Medical Record No.  [id number]                               Onset Date:  10/05/19   Start of Care Date:  10/06/19      Type:     _X_PT   ___OT   ___SLP Medical Diagnosis:  Fall                        PT Diagnosis:  Impaired transfers   Visits from SOC:  1   _________________________________________________________________________________  Plan of Treatment/Functional Goals    Planned Interventions:  ,    balance training, bed mobility training, strengthening, transfer training, wheelchair management/propulsion training, risk factor education, home program guidelines, progressive activity/exercise,       Goals: See Physical Therapy Goals on Care Plan in [employer] electronic health record.    Therapy Frequency: 5x/week  Predicted Duration of Therapy Intervention: 5  _________________________________________________________________________________    I CERTIFY THE NEED FOR THESE SERVICES FURNISHED UNDER        THIS PLAN OF TREATMENT AND WHILE UNDER MY CARE     (Physician co-signature of this document indicates review and certification of the therapy plan).              Certification date from: 10/06/19, Certification date to: 10/07/19    Referring Physician: Chavo Springer MD            Initial Assessment        See Physical Therapy evaluation dated 10/06/19 in Epic electronic health record.

## 2019-10-06 NOTE — PROVIDER NOTIFICATION
Writer paged hospitalist, Dr. Springer regarding ASA 81 mg that was ordered for this morning. Per Dr. Springer, ok to give this am.

## 2019-10-06 NOTE — CONSULTS
"Consult Date:  10/06/2019      CHIEF COMPLAINT:  Fall out of a wheelchair with back pain.      HISTORY OF PRESENT ILLNESS:  This is a 89-year-old  female who was interviewed on station 75.  The patient was semi-cooperative, lying in bed.  She is hard of hearing, rambling, kept saying that she needed to get assistance to get up and then with \"I'm not grown up yet.\"  Psychiatric consult for delirium and agitation.  The patient has actually been under the green signal for the same, hoping to be discharged pending Psychiatry/Neurology consult.  By reviewing the notes, the patient has already been on Zoloft for generalized anxiety disorder.  Unable to get much else out of the patient.  Has to 2 sons who are involved in her care.  Also has memory loss from dementia.      PAST PSYCHIATRIC HISTORY:  Was seen by us in 2015 with very similar complaints.  Was diagnosed at that time with a cognitive disorder, not otherwise specified, rule out dementia.  Had been on Zoloft and Remeron.      CHEMICAL DEPENDENCY HISTORY:  Negative.      PAST MEDICAL HISTORY:  See medical notes.      FAMILY HISTORY:  Negative for mental illness or chemical dependency.      PERSONAL/SOCIAL HISTORY:  The patient was , probably in 2014.  She was an  and worked in the [facility] area.  Has 2 sons, who may be her caregivers and her guardians.      REVIEW OF SYSTEMS:  See the note done by Dr. Chavo Springer dated 10/06/2019.        MENTAL STATUS EXAMINATION:     APPEARANCE:  Dressed in scrubs, poor eye contact, semi-cooperative.     MOOD:  Anxious.     AFFECT:  Blunted.     SPEECH:  Low volume, but otherwise normal rate and rhythm.     PSYCHOMOTOR BEHAVIOR:  Some agitation on being woken up.     THOUGHT PROCESSES:  Circumstantial, not goal oriented, no loosening of associations.  THOUGHT CONTENT:  Unable to assess.     INSIGHT:  Poor.     JUDGMENT:  Poor.     ORIENTED:  To person.     ATTENTION:  Aroused, not fully sustained.   "   MEMORY:  Recent memory loss.     LANGUAGE:  Unable to assess.   FUND OF KNOWLEDGE:  Unable to assess.     MUSCLE STRENGTH AND TONE:  Grossly within normal limits.     GAIT:  Lying in bed.      IMPRESSION:   Axis I:   1.  Delirium, not otherwise specified.   2.  Anxiety disorder, not otherwise specified (by history).   3.  Dementia, not otherwise specified (by history).   Axis II:  Deferred.   Axis III:  See medical notes.   Axis IV:  Social stressors:  Chronic mental health issues, medical issues.   Axis V:  Global Assessment of Functioning 32.      PLAN:  Continue on Zoloft 100 mg a day for anxiety, Remeron 15 mg p.o. at bedtime to help with sleep and anxiety and depression, olanzapine 5 mg bid p.r.n. for agitation.  Please call Psychiatry for followup as needed.         CODY GONZALEZ MD             D: 10/06/2019   T: 10/06/2019   MT: FLORIN      Name:     ROSENDO JEFF   MRN:      [id number]        Account:       [id number]   :      1929           Consult Date:  10/06/2019      Document: [id number]

## 2019-10-06 NOTE — PROVIDER NOTIFICATION
"Text page sent to Dr. Springer, \"[facility] saw patient and no interventions ( see note. Can be patient be discharged today? Need for PT eval?\"  "

## 2019-10-06 NOTE — PROGRESS NOTES
10/06/19 1430   Quick Adds   Quick Adds Certification   Type of Visit Initial PT Evaluation   Living Environment   Lives With facility resident   Living Arrangements assisted living   Home Accessibility no concerns   Transportation Anticipated family or friend will provide   Living Environment Comment Pt lives in an long term with no stairs to manage.   Self-Care   Usual Activity Tolerance fair   Current Activity Tolerance fair   Regular Exercise No   Equipment Currently Used at Home wheelchair, manual   Activity/Exercise/Self-Care Comment Pt required assistance with all ADL   Functional Level Prior   Ambulation 4-->completely dependent   Transferring 2-->assistive person   Toileting 2-->assistive person   Bathing 3-->assistive equipment and person   Communication 0-->understands/communicates without difficulty   Swallowing 0-->swallows foods/liquids without difficulty   Cognition 1 - attention or memory deficits   Fall history within last six months yes   Number of times patient has fallen within last six months 3   Which of the above functional risks had a recent onset or change? transferring;ambulation;fall history   Prior Functional Level Comment Pt is WC dependent at baseline. Needs 1 person assist for bed mobility and transfers   General Information   Onset of Illness/Injury or Date of Surgery - Date 10/05/19   Referring Physician Chavo Springer MD   Patient/Family Goals Statement Family wants pt to return to [facility]   Pertinent History of Current Problem (include personal factors and/or comorbidities that impact the POC) Pt is a 89 yr old female with hx of behavioral distubances with dementia admitted after falling out of a WC. Imaging +ve for chronic L1 burst fx.    Precautions/Limitations fall precautions;spinal precautions   Weight-Bearing Status - LLE full weight-bearing   Weight-Bearing Status - RLE full weight-bearing   General Observations Cooperative   General Info Comments Activity: Ambulate with  assist.    Cognitive Status Examination   Orientation person;place   Level of Consciousness alert;confused   Follows Commands and Answers Questions able to follow single-step instructions;100% of the time   Personal Safety and Judgment impaired;impulsive   Memory impaired   Pain Assessment   Patient Currently in Pain Yes, see Vital Sign flowsheet  (pt c/o mild back pain.)   Posture    Posture Forward head position;Protracted shoulders;Kyphosis   Range of Motion (ROM)   ROM Comment BLE: WFL   Strength   Strength Comments BLE: grossly 4-/5   Bed Mobility   Bed Mobility Comments Supine<>sit: min assist via log rolling with verbal cues   Transfer Skills   Transfer Comments STS at mod assist x 1    Gait   Gait Comments NT   Balance   Balance Comments Static sitting: good, Dynamic sitting: fair   Sensory Examination   Sensory Perception no deficits were identified   Coordination   Coordination Comments NT   Muscle Tone   Muscle Tone Comments NT   General Therapy Interventions   Planned Therapy Interventions balance training;bed mobility training;strengthening;transfer training;wheelchair management/propulsion training;risk factor education;home program guidelines;progressive activity/exercise   Clinical Impression   Criteria for Skilled Therapeutic Intervention yes, treatment indicated   PT Diagnosis Impaired transfers   Influenced by the following impairments Decreased strength and balance   Functional limitations due to impairments assistance needed with transfers.    Clinical Presentation Stable/Uncomplicated   Clinical Presentation Rationale clinical judgement   Clinical Decision Making (Complexity) Low complexity   Therapy Frequency 5x/week   Predicted Duration of Therapy Intervention (days/wks) 5   Anticipated Discharge Disposition Other (see comments)  (Return to [facility] with assist for all mobility and ADL's)   Risk & Benefits of therapy have been explained Yes   Patient, Family & other staff in agreement with plan of  "care Yes   Clinical Impression Comments Pt is near usual baseline and will benefit from additonal visits to increase strength and independence with bed mob and transfers.    Therapy Certification   Start of care date 10/06/19   Certification date from 10/06/19   Certification date to 10/07/19   Medical Diagnosis Fall   Certification I certify the need for these services furnished under this plan of treatment and while under my care.  (Physician co-signature of this document indicates review and certification of the therapy plan).    [facility] AM-PAC  \"6 Clicks\" V.2 Basic Mobility Inpatient Short Form   1. Turning from your back to your side while in a flat bed without using bedrails? 3 - A Little   2. Moving from lying on your back to sitting on the side of a flat bed without using bedrails? 3 - A Little   3. Moving to and from a bed to a chair (including a wheelchair)? 2 - A Lot   4. Standing up from a chair using your arms (e.g., wheelchair, or bedside chair)? 2 - A Lot   5. To walk in hospital room? 1 - Total   6. Climbing 3-5 steps with a railing? 1 - Total   Basic Mobility Raw Score (Score out of 24.Lower scores equate to lower levels of function) 12   Total Evaluation Time   Total Evaluation Time (Minutes) 15     "

## 2019-10-06 NOTE — PLAN OF CARE
Pt here from fall at assisted living. T1 burst fracture. A&O except the year, baseline per family. CMS intact. Bowel sounds active. Incontinent of urine. Large bruise on R lower leg, lidocaine patch in place. Foam dressing on coccyx.  VSS on RA. On bedrest. Wheelchair at baseline. Orthopedics saw patient, ok to discharge. No complaints of pain, scheduled tylenol given. Pt scoring green on the Aggression Stop Light Tool. Potential discharge later this evening back to assisted living.

## 2019-10-06 NOTE — PLAN OF CARE
Pt here post fall w/ positive CT findings of L1 burst fracture. A&Ox4, but is forgetful and has a hx of dementia. CMS intact except R leg weakness. VSS on RA. Tolerating a regular diet. Takes pills whole w/ water. On bedrest, is baseline wheelchair bound. Has a large hematoma on R leg from fall, CECY w/ a scab. Has a sacral foam dressing on her bottom for a small blistering red area. Has been incontinent of bladder, and requires turn/repositioning every 2 hours.  Pt scoring green on the Aggression Stop Light Tool. Has consults in for psych and neuro. Discharge pending progress, continue to monitor.

## 2019-10-06 NOTE — PLAN OF CARE
DATE & TIME: 10/6/2019 5179-1027    Cognitive Concerns/ Orientation : A+Ox2   BEHAVIOR & AGGRESSION TOOL COLOR: Green; calm and cooperative  CIWA SCORE: NA   ABNL VS/O2: RA  MOBILITY: Bedrest; wheelchair  PAIN MANAGMENT: Denies  DIET: Reg  BOWEL/BLADDER: Incontinent of urine and bowel  ABNL LAB/BG: NA  DRAIN/DEVICES: None  TELEMETRY RHYTHM: SR  SKIN: Wound on right lower leg, redness on coccyx  TESTS/PROCEDURES: None  D/C DAY/GOALS/PLACE: Home to [facility] today  OTHER IMPORTANT INFO: NA

## 2019-10-06 NOTE — H&P
Admitted:     10/05/2019      PRIMARY CARE PHYSICIAN:  Dr. Man Stovall.      CHIEF COMPLAINT:  Fall off of wheelchair with back pain.      HISTORY OF PRESENT ILLNESS:  Kalli Brown is a pleasant 89-year-old  female who is a full code, who is hard of hearing, wheelchair bound, who lives in an assisted living facility, and presents to [facility] after a mechanical fall.      The patient, who is hard of hearing, does not give much history, but her 2 sons are quite helpful in this regard.  They state the patient, over the last 4 months, has had increased anxiety and a great deal of inpatients.  Over the last 4 days she has had change and much worse confusion, aggression and delirium.  According to the family today, she was in a wheelchair, reaching up to get papers to clean up her living area, and she had a fall.  She had injured her right lower leg.  She denied any loss of consciousness or head or neck discomfort.  Denied any fevers, chills, sweats, shortness of breath, neck pain, back pain, urinary complaints.  The patient was brought in to [facility] for further assessment.      In the emergency room the patient was seen by physician assistant, Jordy Ko.  The patient was afebrile, hypertensive.  Blood work revealed normal electrolytes, BUN of 54, creatinine 1.08 with a calculated GFR 45, glucose 132.  White count 8.8, hemoglobin 9.4, platelets 318.  Urinalysis shows specific gravity 1.013 with 100 protein, trace blood, negative nitrites, negative leukocyte esterase, 2 white cells, 5 red cells.  X-ray of the tib-fib showed focal soft tissue swelling in the anterolateral aspect but no fractures evident.  There is osteopenia noted.  The patient had a CT of the head without contrast which showed chronic changes, no evidence of an intracranial hemorrhage or acute infarction.  Finally, CT of the lumbar spine showed an acute burst-type fracture to the L1 vertebral body with  minimal posterior protrusion, large lucency with some sclerotic borders involving the fracture at L1 vertebral body; could be due to chronic Schmorl's nodes, although difficult to exclude a pathological lesion.  There is diffuse osteopenia.  In the Emergency Department, the patient received a 1 liter of normal saline bolus.  Due to her acute L1 burst fracture and increased agitation, delirium and behavioral changes, the patient is being admitted as an inpatient for further inpatient Psychiatric and Orthopedic Surgical consultation.      PAST MEDICAL HISTORY:   1.  Anemia.   2.  Hard of hearing.     3.  Shoulder edema.   4.  Glaucoma.   5.  Hypertension.   6.  Multiple myeloma.   7.  Proteinuria.   8.  Shoulder dislocation.   9.  Trigeminal neuralgia.   10.  Behavioral disturbance with dementia.   11.  Generalized anxiety disorder.   12.  Chronic pain.   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   14.  Chronic kidney disease stage III.   15.  History of membranoproliferative glomerulonephritis.   16.  Iron deficiency anemia.   17.  Chronic constipation.   18.  Cataract.      PAST SURGICAL HISTORY:     1.  Status post hip arthroplasty.   2.  Left eye surgery.     3.  Gynecological surgery.      FAMILY HISTORY:  Reviewed and noncontributory.      SOCIAL HISTORY:  No tobacco, no alcohol.  She is full code.  Lives in assisted living, wheelchair bound.      ALLERGIES:  ATORVASTATIN.      CURRENT MEDICATION LIST:   1.  Tylenol 1000 mg 3 times a day.   2.  Norvasc 10 mg once day.   3.  Aspirin 81 mg once a day.   4.  Citracal 1 tablet twice a day.   5.  Carbamazepine 200 mg in the morning, 300 mg in the evening.   6.  Colace 250 mg daily.   7.  Ferrous sulfate 325 mg b.i.d.   8.  Lasix 40 mg once a day.   9.  Xalatan 0.005% 1 drop both eyes at bedtime.   10.  Remeron 7.5 mg at bedtime.   11.  Zoloft 100 mg once day.   12.  Timolol 0.5% 1 drop right eye daily.   13.  Ultram 50 mg every 6 hours.     14.  Diovan 320/12.5 one tablet once a  day.   15.  Vitamin D 1000 units once a day.      REVIEW OF SYSTEMS:  Ten points reviewed, hard of hearing, dry mouth.  No urinary complaints.  No chest pain.  No shortness of breath.  Positive for right leg pain.  No fevers, chills or sweats.  Eating and drinking reasonably well, having normal bowel movements.  No black or bloody stools.  No headache, no visual changes.  Otherwise 10-point review of systems reviewed.      PHYSICAL EXAMINATION:   VITAL SIGNS:  Temperature 97.5, heart rate 66, respirations 16, blood pressure 187/82, sats 95% on room air.   GENERAL:  The patient is a 89-year-old  female who is hard of hearing.   HEENT:  Head is atraumatic.  Pupils are unremarkable.  Sclerae are anicteric.  Oropharynx and lips are dry.   NECK:  Veins are not elevated.  No cervical lymphadenopathy.   PULMONARY:  Lungs clear to auscultation.   CARDIOVASCULAR:  S1, S2, regular rate.   GASTROINTESTINAL:  Abdomen is soft, normoactive bowel sounds.   MUSCULOSKELETAL:  She has got a hematoma at the right leg, above the ankle.  She has got venous stasis pigmentary changes.   SKIN:  Warm, dry.   NEUROLOGIC:  She does also have point tenderness over the L1 area.  She is able to move all 4 extremities.   PSYCHIATRIC:  Mood and affect stable.      LABORATORY DATA:  As dictated in the history of present illness.      ASSESSMENT:  Kalli Brown is 89 with worsening agitated delirium over the last 4 days, much worse, getting worse over the past 4 months, who has had a mechanical fall with L1 burst compression fracture, who is being admitted for further treatment.      PLAN:   1.  Mechanical fall with L1 burst fracture.  The patient will be treated with pain medication in the form of Tylenol, which she is scheduled 1000 mg 3 times a day.  She was previously on Ultram, but we are going to discontinue this in the setting of her ongoing delirium.  We will proceed with a lidocaine patch to be worn during the daytime.  The patient  will be seen by Orth[facility].  The patient may need a corset or TLSO brace.  She is nonambulatory and wheelchair bound, so this may affect how they want to approach treatment.  The patient will be seen by PT as well.   2.  Agitated delirium in the setting of worsening anxiety.  The patient is on Remeron 7.5 mg, which we are going to increase to 15.  We are going to make Zyprexa available p.r.n. overnight for agitation.  The patient will be seen by Psychiatry for further increased anxiety for her as well as for her delirium.  She will need to be placed on delirium prevention protocol.   3.  Trigeminal neuralgia.  We will continue the patient on Tegretol.   4.  Hypertension.  She is hypertensive, we will continue the patient on Norvasc as well as her valsartan and hydrochlorothiazide.   5.  Anxiety.  We will continue the patient on Zoloft and increase the dose of Remeron.   6.  Osteopenia.  We will continue the patient on vitamin D and calcium.   7.  Deep venous thrombosis prophylaxis.  Patient with compression boots.      CODE STATUS:  Full.         RONNIE HURETA MD             D: 10/05/2019   T: 10/05/2019   MT: MEGAN      Name:     ROSENDO JEFF   MRN:      [id number]        Account:      [id number]   :      1929        Admitted:     10/05/2019                   Document: [id number]       cc: Man Stovall MD

## 2019-10-06 NOTE — DISCHARGE SUMMARY
[facility]  Discharge Summary        Kalli Brown MRN# [id number]   YOB: 1929 Age: 89 year old     Date of Admission:  10/5/2019  Date of Discharge:  10/6/2019  Admitting Physician:  Chavo Springer MD  Discharge Physician: Chavo Springer MD  Discharging Service: Hospitalist     Primary Provider:  Man Stovall  Primary Care Physician Phone Number: [phone]         Discharge Diagnoses/Problem Oriented Hospital Course (Providers):    Kalli Brown was admitted on 10/5/2019 by Chavo Springer MD and I would refer you to their history and physical.  The following problems were addressed during her hospitalization:      ASSESSMENT:  Kalli Brown is 89 with worsening agitated delirium over the last 4 days, much worse, getting worse over the past 4 months, who has had a mechanical fall with L1 burst compression fracture, who is being admitted for further treatment.      PLAN:   1.  Mechanical fall with L1 burst fracture that is chronic  -- seen by orthopedics  -- no significant pain on exam  -- ok for discharge to be in wheelchair long term.  2.  Agitated delirium in the setting of worsening anxiety.   -- seen by psychiatry  --  Remeron 7.5 mg,   increased to 15.    -- no evidence of delirium   3.  Trigeminal neuralgia.   -- continue the patient on Tegretol.   4.  Hypertension.    -- continue the patient on Norvasc as well as her valsartan and hydrochlorothiazide.   5.  Anxiety.    -- continue the patient on Zoloft and increased dose of Remeron.   6.  Osteopenia.  We will continue the patient on vitamin D and calcium.        Code Status:      Full Code         Important Results:      See below         Pending Results:        Unresulted Labs Ordered in the Past 30 Days of this Admission     No orders found for last 31 day(s).               Discharge Instructions and Follow-Up:      Follow-up Appointments     Follow-up and recommended labs and  tests       Follow up with primary care provider, Man Stovall MD, within   7 days for hospital follow- up.  No follow up labs or test are needed.                  Discharge Disposition:      Discharged to long-term care facility         Discharge Medications:        Current Discharge Medication List      CONTINUE these medications which have CHANGED    Details   !! mirtazapine (REMERON) 15 MG tablet Take 1 tablet (15 mg) by mouth At Bedtime  Qty: 30 tablet, Refills: 1    Associated Diagnoses: DAVID (generalized anxiety disorder)       !! - Potential duplicate medications found. Please discuss with provider.      CONTINUE these medications which have NOT CHANGED    Details   acetaminophen (TYLENOL) 500 MG tablet Take 2 tablets (1,000 mg) by mouth 3 times daily  Qty: 540 tablet, Refills: 0    Associated Diagnoses: Chronic low back pain, unspecified back pain laterality, with sciatica presence unspecified      amLODIPine (NORVASC) 10 MG tablet Take 1 tablet (10 mg) by mouth daily  Qty: 90 tablet, Refills: 3    Associated Diagnoses: Benign essential hypertension      aspirin (ASPIRIN LOW DOSE) 81 MG chewable tablet 1 TAB BY MOUTH DAILY (DX:ANTICOAGULATION)  Qty: 30 tablet, Refills: 11    Associated Diagnoses: Benign essential hypertension      calcium citrate-vitamin D (CITRACAL) 315-250 MG-UNIT TABS per tablet Take 1 tablet by mouth 2 times daily -Take with food  Qty: 180 tablet, Refills: 3    Associated Diagnoses: Osteoporosis, unspecified osteoporosis type, unspecified pathological fracture presence      !! carBAMazepine (TEGRETOL) 200 MG tablet Take 200 mg by mouth every morning      !! carBAMazepine (TEGRETOL) 200 MG tablet Take 300 mg by mouth every evening      docusate sodium (COLACE) 250 MG capsule Take 1 capsule (250 mg) by mouth daily as needed for constipation  Qty: 90 capsule, Refills: 3    Associated Diagnoses: Constipation, unspecified constipation type      ferrous sulfate (FEROSUL) 325 (65 Fe)  MG tablet Take 1 tablet (325 mg) by mouth 2 times daily (with meals)  Qty: 60 tablet, Refills: 11    Associated Diagnoses: Iron deficiency anemia, unspecified iron deficiency anemia type      furosemide (LASIX) 40 MG tablet Take 1 tablet (40 mg) by mouth daily  Qty: 90 tablet, Refills: 3    Comments: Dose reduction  Associated Diagnoses: Benign essential hypertension      latanoprost (XALATAN) 0.005 % ophthalmic solution Place 1 drop into both eyes At Bedtime  Qty: 7.5 mL, Refills: 3    Associated Diagnoses: Cataract, unspecified cataract type, unspecified laterality      menthol (HALLS COUGH DROPS) 7 MG LOZG Take 1 lozenge (7 mg) by mouth every hour as needed for cough  Qty: 84 lozenge      !! mirtazapine (REMERON) 7.5 MG tablet Take 1 tablet (7.5 mg) by mouth At Bedtime  Qty: 90 tablet, Refills: 3    Associated Diagnoses: Primary insomnia      polyethylene glycol (MIRALAX/GLYCOLAX) packet Take 1 packet by mouth daily      sertraline (ZOLOFT) 100 MG tablet Take 1 tablet (100 mg) by mouth daily  Qty: 90 tablet, Refills: 3    Associated Diagnoses: DAVID (generalized anxiety disorder)      timolol maleate (TIMOPTIC) 0.5 % ophthalmic solution Place 1 drop into the right eye daily      traMADol (ULTRAM) 50 MG tablet Take 50 mg by mouth every 6 hours as needed for severe pain      valsartan-hydrochlorothiazide (DIOVAN HCT) 320-12.5 MG tablet Take 1 tablet by mouth daily  Qty: 90 tablet, Refills: 3    Associated Diagnoses: CKD (chronic kidney disease) stage 3, GFR 30-59 ml/min (H)      Vitamin D, Cholecalciferol, 1000 units TABS Take 1 tablet (1,000 Units) by mouth daily  Qty: 30 tablet, Refills: 11    Associated Diagnoses: Vitamin D deficiency      order for DME Equipment being ordered: Incentive Spirometer  Qty: 1 each, Refills: 0    Associated Diagnoses: Community acquired pneumonia, unspecified laterality       !! - Potential duplicate medications found. Please discuss with provider.               Allergies:          Allergies   Allergen Reactions     Atorvastatin             Consultations This Hospital Stay:      Consultation during this admission received from orthopedics          Discharge Orders     After Care Instructions     Activity      Your activity upon discharge: activity as tolerated in wheelchair         Diet      Follow this diet upon discharge: Orders Placed This Encounter      Regular Diet Adult                      Discharge Time:      Less than 30 minutes.        Image Results From This Hospital Stay (For Non-EPIC Providers):        Results for orders placed or performed during the hospital encounter of 10/05/19   CT Head w/o Contrast    Narrative    CT SCAN OF THE HEAD WITHOUT CONTRAST   10/5/2019 1:23 PM     HISTORY: Confusion, fall    TECHNIQUE: Axial images of the head and coronal reformations without  IV contrast material. Radiation dose for this scan was reduced using  automated exposure control, adjustment of the mA and/or kV according  to patient size, or iterative reconstruction technique.    COMPARISON: 7/22/2018.    FINDINGS: Moderate cerebral atrophy is present. There are patchy white  matter changes in both hemispheres without mass effect. There is no  evidence for intracranial hemorrhage, mass effect, acute infarct, or  skull fracture. Vascular calcifications noted. Visualized paranasal  sinuses and mastoid air cells are clear.      Impression    IMPRESSION: Chronic changes. No evidence for intracranial hemorrhage  or any acute infarct.    DENY HUANG MD   XR Tibia & Fibula Right 2 Views    Narrative    XR TIBIA & FIBULA RT 2 VW 10/5/2019 1:12 PM     HISTORY: fall    COMPARISON: None.      Impression    IMPRESSION: Focal soft tissue swelling in the anterior lateral aspect  of the mid to distal fibular diaphyseal region. No fractures are  evident. Osteopenia.    JESSICA MALCOLM MD   Lumbar spine CT w/o contrast    Narrative    CT LUMBAR SPINE WITHOUT CONTRAST 10/5/2019 2:10 PM     HISTORY:  Fall    TECHNIQUE: Axial images were obtained through the lumbar spine without  contrast.    FINDINGS: Five lumbar type vertebral bodies are present. There is a  fracture through the L1 vertebral body which involves both the  anterior and middle columns. There is some very minimal posterior  protrusion of the right paramedian posterior superior corner of the L1  vertebral body into the canal. There is a large lucency in this  vertebral body which could be due to a large Schmorl's node defect or  a pathologic lesion. No other fractures are present. There is diffuse  osteopenia.    T12-L1: Posterior osteophyte formation, disc bulging and facet  hypertrophy is present causing some mild to moderate central canal and  mild-to-moderate bilateral neural foraminal stenosis. There is also  some mild to moderate central canal stenosis at the level of the burst  fracture just below the disc space.    L1-L2: Posterior osteophyte formation, disc bulging and facet  hypertrophy is present causing moderate bilateral neural foraminal  stenosis, but no central canal stenosis.    L2-L3: Posterior osteophyte formation and facet hypertrophy and  left-sided uncinate spurring is present causing some mild bilateral  neural foraminal stenosis and mild central canal stenosis.    L3-L4: Broad-based disc bulging and moderate facet and ligament flavum  hypertrophy is present causing mild-to-moderate central canal stenosis  and mild-to-moderate bilateral neural foraminal stenosis.    L4-L5: Posterior osteophyte formation, disc bulge and facet  hypertrophy is present causing some moderate central canal stenosis,  moderate right-sided foraminal stenosis and mild left-sided foraminal  stenosis.    L5-S1: Posterior osteophyte formation, moderate facet and ligament  flavum hypertrophy is present causing moderate bilateral neural  foraminal stenosis and moderate central canal stenosis.    Paraspinal soft tissues: Unremarkable as visualized.       Impression    IMPRESSION:  1. Acute burst type fracture through L1 vertebral body with minimal  posterior protrusion contributing to some mild to moderate central  canal stenosis in conjunction with some degenerative changes.  2. Large lucency with some sclerotic borders involving the fractured  L1 vertebral body. This could be due to a chronic Schmorl's node,  although it is difficult to exclude pathologic lesion such as  metastatic deposit.  3. Diffuse osteopenia.  4. Multilevel degenerative disc and facet disease with multilevel mild  to moderate central canal stenoses.  5. Results called to and discussed with the referring clinician at  1420 on 10/5/2019.    DENY HUANG MD           Most Recent Lab Results In EPIC (For Non-EPIC Providers):    Most Recent 3 CBC's:  Recent Labs   Lab Test 10/06/19  0725 10/05/19  1305 09/23/19  1009   WBC 7.9 8.2 7.5   HGB 7.6* 9.4* 9.0*   * 100 104*    318 349      Most Recent 3 BMP's:  Recent Labs   Lab Test 10/06/19  0725 10/05/19  1305 09/23/19  1009    139 139   POTASSIUM 4.3 4.4 4.8   CHLORIDE 113* 110* 105   CO2 26 28 27   BUN 48* 54* 49*   CR 1.06* 1.08* 1.01   ANIONGAP 4 1* 7   EBONY 8.0* 8.6 9.0   GLC 95 132* 95     Most Recent 3 Troponin's:  Recent Labs   Lab Test 11/18/18  1101 10/23/15  1030 07/20/14  0316   TROPI <0.015 <0.015  The 99th percentile for upper reference range is 0.045 ug/L.  Troponin values in   the range of 0.045 - 0.120 ug/L may be associated with risks of adverse   clinical events.   <0.012     Most Recent 3 INR's:  Recent Labs   Lab Test 02/25/16  0652 10/23/15  1042   INR 0.95 0.99     Most Recent 2 LFT's:  Recent Labs   Lab Test 05/20/19  1715 07/02/18  1227   AST 50* 32   ALT 58* 25   ALKPHOS 137 145   BILITOTAL 0.2 0.4     Most Recent Cholesterol Panel:No lab results found.  Most Recent 6 Bacteria Isolates From Any Culture (See EPIC Reports for Culture Details):  Recent Labs   Lab Test 07/21/15  2145 01/31/15  0205  08/24/13  0250   CULT No growth <10,000 colonies/mL mixed urogenital denise No growth     Most Recent TSH, T4 and HgbA1c:  Recent Labs   Lab Test 07/02/18  1227   TSH 2.37

## 2019-10-06 NOTE — PROGRESS NOTES
BRISEIDA    D: SW following for discharge needs. BRISEIDA contacted [facility] ([phone]) and spoke with MARIELY Pagan. Per Latasha, patient lives the [facility] and can return anytime. Latasha requests discharge orders to be faxed to [phone]. BRISEIDA sent discharge orders via DOD.  BRISEIDA spoke with chavo about plans to discharge patient back to [facility]. Chavo requests HE wheelchair ride, which is set up for at 1700. Patient's son warns patient may not agree as her preference is to have son's purchase a home and arrange for HC 24/7. RN updated on transport time.     P: No other SW needs at this time.

## 2019-10-06 NOTE — CONSULTS
Ortho Consult    Patient is a 89 yr old female wheelchair bound who lives in assisted living.  She fell out of her wheelchair yesterday and was brought in for evaluation of a tibial bruise and back evaluation.  She has had a previous L1 burst fracture 10 years ago.  She has been comfortable for the past 20 hours on regular Tylenol and no complaints of pain with moving or sitting up in a chair.    PE  Kyphotic elderly female with dislocated shoulders         No bruising or ecchymosis of back         No focal tenderness over lumbar spine         Normal sensation distally in both extremities    CAT scan demonstrates old healed L1 burst fracture with some cystic changes in fracture line and greater than 50% collapse.    Plan: Continue with wheelchair for ambulation           May transfer back to NH.

## 2019-10-07 NOTE — TELEPHONE ENCOUNTER
"ED / Discharge Outreach Protocol    Patient Contact    Attempt # 1    Was call answered?  Yes.  \"May I please speak with <Kalli>\"  Is patient available?   No.  Spoke with Fuad- Consent to communicate on file      ED for acute condition Discharge Protocol    Spoke with pt's son- Family and care facility believe pt may be falling \"on purpose\" so that she can leave her apartment because she wants to live independently in a house- \"which is not feasible\"       We scheduled a hospital f/u appt with PCP for 10/14    To PCP: Would you agree with Care Coordination referral? Pended, if appropriate.     Thank you,   Tammi BURROUGHS RN          "

## 2019-10-07 NOTE — ED NOTES
Bed: ED27  Expected date:   Expected time:   Means of arrival:   Comments:  E2  89F Fall/Right hip pain  2692

## 2019-10-07 NOTE — TELEPHONE ENCOUNTER
Chief Complaint: Fall, Initial Encounter, Aiden (Generalized Anxiety Disorder),  SUN 06-OCT-2019  0 / 1    [phone] (home)

## 2019-10-07 NOTE — ED PROVIDER NOTES
"  History     Chief Complaint:  Hip Pain      The history is provided by the patient and the EMS personnel.      Kalli Brown is a 89 year old female with a history of a spinal fracture who presents following a fall via EMS. She reports that she was trying to reach for something on the floor an hour ago at her assisted living facility when she \"slipped out of her wheelchair\" and fell onto the floor. EMS stated that the patient reported right hip pain. In the emergency department the patient denies having any pain. She also denies shortness of breath, fever, nausea, vomiting, or impaired appetite. She reports a cough \"once in a while\" and that she sometimes experiences tingling in her fingers. She reports that the healing hematoma on her right shin is not from her fall today.    Allergies:  Atorvastatin    Medications:    Aspirin 81 mg  Lasix  Remeron  Zoloft  Diovan  Norvasc  Colace  Tegretol  Miralax    Past Medical History:    Burst fracture of lumbar vertebrae  Demential with behavioral disturbance  HTN  HLD  DAVID  Light chain neuropathy  CKD stage 3 with anemia  Chronic constipation  Trigeminal neuralgia  Cataract  Glaucoma  Balance problem  Failure to thrive  Deafness  Multiple myeloma  Chronic insomnia  Multinodular thyroid  Osteoporosis  Frailty  Edema of extremities    Past Surgical History:    Eye surgery  Partial hip arthroplasty  Hammer toe repair  Vaginal hysterectomy  Polypectomy    Family History:    Heart disease - brother, father    Social History:  Smoking status: Never  Alcohol use: No    Marital Status:      Review of Systems   Constitutional: Negative for appetite change and fever.   Respiratory: Positive for cough. Negative for shortness of breath.    Gastrointestinal: Negative for nausea and vomiting.   Musculoskeletal: Positive for arthralgias.   Neurological: Positive for numbness.   All other systems reviewed and are negative.        Physical Exam     Patient Vitals for the " "past 24 hrs:   BP Temp Temp src Pulse Heart Rate Resp SpO2 Height Weight   10/07/19 0015 -- -- -- -- -- -- 96 % -- --   10/07/19 0000 (!) 159/91 -- -- 57 -- -- 96 % -- --   10/06/19 2345 (!) 159/76 -- -- 55 -- -- 96 % -- --   10/06/19 2330 (!) 168/76 -- -- -- -- -- 95 % -- --   10/06/19 2329 (!) 168/76 97.7  F (36.5  C) Oral -- 65 18 96 % 1.651 m (5' 5\") 46.7 kg (103 lb)         Physical Exam  Constitutional:  Cachectic. Pleasant and cooperative. Hard of hearing.  HENT:   Head:    Atraumatic.   Mouth/Throat:   Oropharynx is without erythema or exudate. Tacky oral mucosa.  Eyes:    Conjunctivae normal and EOM are normal.      Pupils are equal, round, and reactive to light.   Neck:    Normal range of motion. Neck supple.   Cardiovascular:  Normal rate, regular rhythm, normal heart sounds and radial and dorsalis pedis pulses are 2+ and symmetric.    Pulmonary/Chest:  Effort normal and breath sounds normal.   Abdominal:   Soft. Bowel sounds are normal.      No splenomegaly or hepatomegaly. No tenderness. No rebound.   Musculoskeletal:  Normal range of motion. No edema and no tenderness.   Neurological:  Alert. Normal strength. No cranial nerve deficit. GCS 15.  Skin:    Skin is warm and dry. Healing hematoma on right distal anterolateral shin. New small abrasion and ecchymosis to left anterior shin.  Psychiatric:   Normal mood and affect.     Emergency Department Course   Imaging:  Radiographic findings were communicated with the patient who voiced understanding of the findings.    XR Pelvis and Hip, right, 1 view:   1. 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 No evidence of hardware  failure or malalignment.  2. No visualized acute fracture or malalignment of the pelvis or right  hip.     Read as per radiology.    Lumbar spine XR, 2-3 views:  1. An age-indeterminate moderate to severe compression deformity of  the L1 vertebral body is present, also described on the recent  comparison CT scan.  2. Normal height of the " remainder of the lumbar vertebral bodies.  3. Mild dextroconvex curvature of the lumbar spine. Otherwise, normal  alignment of the lumbar spine.  4. Mild degenerative changes in the lumbar spine.  5. Atherosclerotic calcification in the thoracic aorta.    Read as per radiology.    Laboratory:  CBC: WBC: 9.3, HGB: 7.6 (L), PLT: 285    Emergency Department Course:  Nursing notes and vitals reviewed. (4917) I performed an exam of the patient as documented above.     Blood drawn. This was sent to the lab for further testing, results above.     The patient was sent for a pelvis and hip X-ray and lumbar spine X-ray while in the emergency department, findings above.     0118 I rechecked the patient and discussed the results of her workup thus far.     Findings and plan explained to the Patient. Patient discharged home with instructions regarding supportive care and reasons to return. The importance of close follow-up was reviewed.     I personally reviewed the laboratory results with the Patient and answered all related questions prior to discharge.    Impression & Plan    Medical Decision Making:  Kalli Brown, a 89 year old year old female, presents after sliding out of her wheelchair onto the floor. She said that she was reaching for something and her chair cushion gave way and slipped with her. She complained of hip pain to EMS. She says that she has had back pain since a recent fall that caused a compression fracture. She said that this was more severe when she fell but is now back to baseline.     On exam she does not have any midline bony tenderness in her neck or back and she can put her hips through full active and passive range of motion. There is mild discomfort with compression over her pelvis but no bony point tenderness. I also note a large healing hematoma on her right lower leg and what looks like a new bruise on her left shin.     I checked an X-ray of her right hip, pelvis, and lumbar spine.  These all look unchanged from previous without signs of new fracture or acute abnormalities. I reviewed the patient's records and she was hospitalized yesterday following her compression fracture diagnosis she was noted to be anemic with a hemoglobin of 7.6. This was down from 9.4 two days ago. Patient denied any history of current bleeding. I rechecked her hemoglobin and it is still 7.6. Patient is not having any orthostatic symptoms, she is not tachycardic, and blood pressure is adequate. She'll continue her iron supplements and try to eat an iron rich diet. She will watch for any signs of bleeding.    Nursing attempted to contact the patient's two sons but was unable to reach them. We were able to reach the care facility where the patient has been staying and they agree to accept her back. I discussed X-ray and lab results with the patient and follow up plan. She is discharged in good condition.    Diagnosis:    ICD-10-CM    1. Fall, initial encounter W19.XXXA    2. Anemia, unspecified type D64.9    3. Acute midline low back pain without sciatica M54.5        Disposition:  discharged to home    Gregorio Joyce  10/6/2019    EMERGENCY DEPARTMENT  Scribe Disclosure:  I, Gregorio Joyce, am serving as a scribe on 10/6/2019 at 11:40 PM to personally document services performed by Andrea Rodriguez MD based on my observations and the provider's statements to me.      Andrea Rodriguez MD  10/07/19 0701

## 2019-10-07 NOTE — ED NOTES
Pt states she received a sponge bath and shampoo at [facility] last evening 10/6/19.  RN  Rewashed face and neck, brushed teeth, cleaned hands and fingers.

## 2019-10-07 NOTE — ED NOTES
Dr Rodriguez notified that attempted both sons phone numbers and left messages with both to contact NH for more info.    Called NH RN and gave report.  Per RN: this afternoon when pt returned to [place], pt was in a w/c, dug her feet into the ground and screamed and yelled and was refusing to leave ambulance.  Pt did the same as they were trying to get her into the building, her room and her be.  Pt had only bee there a short time before falling out of her w/c.  Per Nurse, pt does not like [place], wants her sons to by her a home and staff it with 24 hour round the clock nursing staff.      Pt herself had told the writer she does not like [facility] because she wants them to have more staff.

## 2019-10-08 NOTE — PROGRESS NOTES
Clinic Care Coordination Contact  [facility]/Voicemail    Referral Source: PCP  Clinical Data: Care Coordinator Outreach    Outreach attempted x 1.  Left message on pt's son, Fuad's, voicemail with call back information and requested return call.    Plan: Care Coordinator will try to reach patient again in 1-2 business days.    EUSEBIO [place], [facility]  Clinic Care Coordinator  [facility]  [phone]  [email or web address]

## 2019-10-08 NOTE — LETTER
[facility] CARE COORDINATION    October 11, 2019    Kalli Joyner Kevin  [address] [facility] [address]      Dear Kalli,    I am a clinic care coordinator who works with Man Stovall MD, MD at [facility]. I wanted to introduce myself and provide you with my contact information so that you can call me with questions or concerns about your health care. Below is a description of clinic care coordination and how I can further assist you.     The clinic care coordinator is a registered nurse and/or  who understand the health care system. The goal of clinic care coordination is to help you manage your health and improve access to the [facility] in the most efficient manner. The registered nurse can assist you in meeting your health care goals by providing education, coordinating services, and strengthening the communication among your providers. The  can assist you with financial, behavioral, psychosocial, chemical dependency, counseling, and/or psychiatric resources.    Please feel free to contact me at [phone], with any questions or concerns. We at [facility] are focused on providing you with the highest-quality healthcare experience possible and that all starts with you.     Sincerely,     ETIENNE Damico

## 2019-10-08 NOTE — TELEPHONE ENCOUNTER
carBAMazepine (TEGRETOL) 200 MG tablet (Discontinued) 75 tablet 11 7/9/2018 10/5/2019 No   Sig: Take 200 mg in the AM and 300 mg in the PM.   Sent to pharmacy as: carBAMazepine (TEGRETOL) 200 MG tablet   Class: E-Prescribe   Reason for Discontinue: Medication Reconciliation Clean Up   Order: [id number]   E-Prescribing Status: Receipt confirmed by pharmacy (7/9/2018 11:38 AM CDT)     This Order Has Been Discontinued     Order Status Reason By On   Discontinued Medication Reconciliation Clean Up Adamaris Yanes 10/5/19 1444          Associated Diagnoses     Trigeminal neuralgia [G50.0]  - Primary             Fax from pharmacy seeking refill of this medication.    Rx was discontinued for med rec clean up and made into 2 historical entries in chart at recent hospital visit.    LOV 9-23-19 [place]  Next 5 appointments (look out 90 days)    Oct 14, 2019  4:30 PM CDT  Office Visit with Man Stovall MD  [facility] [address] [facility] [id number] [phone]          Pao Qureshi, RT (R)

## 2019-10-11 NOTE — PROGRESS NOTES
Clinic Care Coordination Contact    Clinic Care Coordination Contact  OUTREACH    Referral Information:  Referral Source: PCP    Primary Diagnosis: Injury/Fall    Chief Complaint   Patient presents with     Clinic Care Coordination - Initial     Clinical Concerns:  MARKIE GOINS spoke with pt's son, Fuad, regarding concerns for pt's overall wellbeing. Fuad shared that pt is unhappy in her [facility] and wants to live in a house with with a housekeeped. Fuad explained that due to pt's overall health this is not a possibility.     Living Situation:  Pt has lived at [facility] in [place] for the past 4 years. She doesn't like it there for multiple reasons including: she likes to have control and for things done when she wants it done (she doesn't like having to wait for a response from staff), she feels a facility is not a home, she wants independence, and would like a designated person to serve her needs.     Prior to living in the [facility] she moved from many different ALFs within a year. Before that her  cared for her exclusively.     Fuad shared that he does believe that pt will be needing a Nursing Home in the near future. He is thoughtful of [place] city because she talks about liking smaller towns and getting a private room.    Medication Management:  Pt is currently on prozac. Originally, it seemed to be helping, now her behaviors are back to the same (outbursts and tantrums). Fuad plans to discuss medication with Dr. Stovall at pt's next appointment.    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 Fuad also believes her cognitive ability is declining due to small strokes. Her memory and finding words are becoming more difficult. Fuad and the [facility] staff believe that she fell out of her wheelchair on purpose because she prefers to be in the hospital over her [facility]. When she was to return to  [facility], she began kicking and screaming and it took 6 people to help her out of the vehicle.    Diet/Exercise/Sleep:  All meals are provided by [facility].    Psychosocial:  Fuad believes that pt is depressed and anxious. She isolates in her room, she doesn't participate in activities or meals, and she keeps the shades closed in her room at all times.     Pt is not open to counseling services but she may allow a  to come visit with her. CC SW provided resources for  services.     Resources and Interventions:  Community Resources:[facility]  Referrals Placed:  Services; [facility]  and [facility].     Goals        General    1. Psychosocial     Notes - Note created  10/11/2019 10:05 AM by Fadia Ferreira LGSW    Goal Statement: Within the next 2-3 months, Kalli's family would like to support her in finding happiness in her living situation while ensuring her daily safety.    Measure of Success: Fuad will verbally inform CC SW of success    Supportive Steps to Achieve: Care Coordination  will assist by providing community resources.    Barriers: Supporting Kalli's wants and maintaining her safety    Strengths: Support system    Date to Achieve By: 1/11/2020    Patient expressed understanding of goal: Fuad verbally stated understanding of goal           Patient/Caregiver understanding: Pt verbally stated understanding    Outreach Frequency: monthly  Future Appointments              In 3 days Man Stovall MD [facility],       Plan: CC SW will follow up in 3 weeks to discuss progress. Fuad was reminded of CC SW contact information and encouraged to call with questions or concerns that arise before next outreach.    EUSEBIO Damico, ETIENNE  Clinic Care Coordinator  [facility]  [phone]  [email or web address]

## 2019-10-15 NOTE — TELEPHONE ENCOUNTER
"Prescription approved per [facility] Refill Protocol.  Luz ALVARADO RN    Last Written Prescription Date:  7/5/2018  Last Fill Quantity: 540,  # refills: 0   Last office visit: 9/23/2019 with prescribing provider:     Future Office Visit:    Requested Prescriptions   Pending Prescriptions Disp Refills     acetaminophen (TYLENOL) 500 MG tablet 540 tablet 0     Sig: Take 2 tablets (1,000 mg) by mouth 3 times daily       Analgesics (Non-Narcotic Tylenol and ASA Only) Passed - 10/14/2019  7:18 PM        Passed - Recent (12 mo) or future (30 days) visit within the authorizing provider's specialty     Patient has had an office visit with the authorizing provider or a provider within the authorizing providers department within the previous 12 mos or has a future within next 30 days. See \"Patient Info\" tab in inbasket, or \"Choose Columns\" in Meds & Orders section of the refill encounter.              Passed - Patient is 7 months old or older     If patient is a peds patient of the age 7 mos -12 years, ok to refill using weight-based dosing.     If >3g daily and/or sig is not \"prn\", check for liver enzymes. If normal in the last year, ok to refill.  If not, refer to the provider.          Passed - Medication is active on med list        "

## 2019-10-25 PROBLEM — S81.801A OPEN WOUND OF RIGHT LOWER LEG: Status: ACTIVE | Noted: 2019-01-01

## 2019-10-25 NOTE — CONSULTS
"[facility] General Surgery Consultation    Kalli Brown MRN# [id number]   YOB: 1929 Age: 89 year old      Date of Admission:  10/25/2019  Date of Consult: 10/25/2019         Assessment and Plan:   Patient is a 89 year old female with cellulitis and an open wound on her right lower leg associated with a recent fall and hematoma. On exam, there is old blood draining from the open wound with surrounding cellulitis. No evidence of underlying abscess requiring further surgical debridement at this time. Agree with admission for IV abx. Some concern for osteomyelitis as open wound does track to bone.     PLAN:  No indication for operative debridement at this time  No evidence of necrotizing infection  Possible osteomyelitis - consider MRI  IV abx and close follow up  Dr. Carrizales will see patient over the weekend.          Requesting Physician:      Dr. Mclean        Chief Complaint:     Chief Complaint   Patient presents with     Wound Check          History of Present Illness:   Kalli Brown is a 89 year old female who was seen in consultation at the request of Dr. Mclean who presented with an open wound on her right lower leg. She was recently seen in the ER after a fall out of her wheelchair 10/6/2019. Her son is here with her and reports >10 falls recently. On 10/6 she had a large healing hematoma on her right lower leg. She was discharged back to her care facility and returned today as the wound was draining, red and painful.           Physical Exam:   Blood pressure (!) 187/80, pulse 64, temperature 97.4  F (36.3  C), temperature source Axillary, resp. rate 18, height 1.702 m (5' 7\"), weight 46.7 kg (103 lb), SpO2 91 %, not currently breastfeeding.  103 lbs 0 oz  General: alert, no distress, appears comfortably lying in bed  Psych: Alert and Oriented.  Normal affect  Neurological: grossly intact  Eyes: Sclera clear  Respiratory:  nonlabored " breathing  Cardiovascular:  Regular Rate and Rhythm   GI: soft, nt. nd   Lymphatic/Hematologic/Immune:  No femoral or cervical lymphadenopathy.  Integumentary:  Right lower leg - on the anterolateral aspect there is a 1cm open wound with dark hematoma fluid draining with pressure. There is surrounding erythema and induration present across the anterior lower leg. The bone is palpable easily at the base of the open wound.          Past Medical History:     Past Medical History:   Diagnosis Date     Anemia      Deafness      Dislocated shoulder     bilateral, chronic     Edema      Glaucoma      Hypertension      Multiple myeloma (H)     with light chain deposition disease     Proteinuria     normal renal function     Shoulder dislocation      Trigeminal neuralgia             Past Surgical History:     Past Surgical History:   Procedure Laterality Date     EYE SURGERY       GYN SURGERY       ORTHOPEDIC SURGERY       PHACOEMULSIFICATION CLEAR CORNEA WITH STANDARD INTRAOCULAR LENS IMPLANT  6/2/2014    Procedure: PHACOEMULSIFICATION CLEAR CORNEA WITH STANDARD INTRAOCULAR LENS IMPLANT;  Surgeon: Doe Liu MD;  Location: [facility]            Current Medications:           sodium chloride (PF)  3 mL Intracatheter Q8H       lidocaine 4%, lidocaine (buffered or not buffered), melatonin, naloxone, sodium chloride (PF)         Home Medications:     Prior to Admission medications    Medication Sig Last Dose Taking? Auth Provider   acetaminophen (TYLENOL) 500 MG tablet Take 2 tablets (1,000 mg) by mouth 3 times daily 10/25/2019 at 0800 Yes Man Stovall MD   amLODIPine (NORVASC) 10 MG tablet Take 1 tablet (10 mg) by mouth daily 10/25/2019 at am Yes Man Stovall MD   aspirin (ASPIRIN LOW DOSE) 81 MG chewable tablet 1 TAB BY MOUTH DAILY (DX:ANTICOAGULATION) 10/25/2019 at am Yes Man Stovall MD   calcium citrate-vitamin D (CITRACAL) 315-250 MG-UNIT TABS per tablet Take 1 tablet by mouth 2  times daily -Take with food 10/25/2019 at am Yes Man Stovall MD   carBAMazepine (TEGRETOL) 200 MG tablet Take 200 mg by mouth every morning 10/25/2019 at am Yes Unknown, Entered By History   carBAMazepine (TEGRETOL) 200 MG tablet Take 300 mg by mouth every evening  Yes Unknown, Entered By History   docusate sodium (COLACE) 250 MG capsule Take 1 capsule (250 mg) by mouth daily as needed for constipation  Yes Man Stovall MD   ferrous sulfate (FEROSUL) 325 (65 Fe) MG tablet Take 1 tablet (325 mg) by mouth 2 times daily (with meals) 10/25/2019 at am Yes Man Stovall MD   furosemide (LASIX) 40 MG tablet Take 1 tablet (40 mg) by mouth daily 10/25/2019 at am Yes Man Stovall MD   latanoprost (XALATAN) 0.005 % ophthalmic solution Place 1 drop into both eyes At Bedtime  Yes Man Stovall MD   menthol (HALLS COUGH DROPS) 7 MG LOZG Take 1 lozenge by mouth every 2 hours as needed for cough   Yes Man Stovall MD   mirtazapine (REMERON) 15 MG tablet Take 1 tablet (15 mg) by mouth At Bedtime  Yes Chavo Springer MD   polyethylene glycol (MIRALAX/GLYCOLAX) packet Take 1 packet by mouth daily 10/25/2019 at am Yes Unknown, Entered By History   sertraline (ZOLOFT) 100 MG tablet Take 1 tablet (100 mg) by mouth daily 10/25/2019 at am Yes Man Stovall MD   timolol maleate (TIMOPTIC) 0.5 % ophthalmic solution Place 1 drop into the right eye daily 10/25/2019 at am Yes Unknown, Entered By History   traMADol (ULTRAM) 50 MG tablet Take 50 mg by mouth every 6 hours as needed for severe pain  Yes Unknown, Entered By History   valsartan-hydrochlorothiazide (DIOVAN HCT) 320-12.5 MG tablet Take 1 tablet by mouth daily 10/25/2019 at am Yes Man Stovall MD   Vitamin D, Cholecalciferol, 1000 units TABS Take 1 tablet (1,000 Units) by mouth daily 10/25/2019 at am Yes Man Stovall MD   order for DME Equipment being ordered: Incentive  Spirometer   Man Stovall MD            Allergies:     Allergies   Allergen Reactions     Atorvastatin             Family History:   No family history on file.        Social History:   Kalli Brown  reports that she has never smoked. She has never used smokeless tobacco. She reports that she does not drink alcohol or use drugs.          Review of Systems:   The 10 point Review of Systems is negative other than noted in the HPI.         Labs/Imaging   All new lab and imaging data was reviewed.   I have personally reviewed the imaging studies including her XR        Erika Wood MD

## 2019-10-25 NOTE — ED PROVIDER NOTES
History     Chief Complaint:  Wound Check      HPI   History limited secondary to dementia. History supplemented by patient's son at bedside.  Kalli Brown is a 89 year old female with a history of hypertension, multiple myeloma, MPGN, and stage III CKD who presents to the ED for a wound check. The patient's son reports that she was initially seen at [facility] on 10/6/19 after experiencing a mechanical fall. At that time she had a hematoma on the right shin although imaging workup was negative for acute injury. The patient's son states that nursing staff at her assisted living facility have been following the hematoma since then. At some point she developed an ulcer to the area, although her son is not sure exactly when this began. The wound reportedly started to bleed yesterday, although staff were able to control it with bandages. The patient's son, who works as an emergency physician, went to visit today and found the wound to be tender, erythematous, and swollen. He had concerns for infection, prompting him to drive the patient to the ED. Here the patient's son denies any fevers, and she has no history of diabetes or cellulitis.     Allergies:  Atorvastatin      Medications:    Amlodipine  Aspirin 81 mg  Citracal  Tegretol  Colace  Ferosul  Lasix  Latanoprost  Menthol  Remeron  Miralax  Zoloft  Ultram  Diovan    Past Medical History:    Failure to thrive  CKD, stage III  MPGN  Anemia  Chronic constipation  Trigeminal neuralgia  Burst fracture of lumbar vertebra  Dementia  Hypertension  DAVID  Chronic pain  Light chain nephropathy  Glaucoma  Cataract  Deafness  Shoulder dislocation  Multiple myeloma    Past Surgical History:    Eye surgery  GYN surgery  Orthopedic surgery  Phacoemulsification clear cornea with standard IOL     Family History:    No past pertinent family history.    Social History:  Negative for tobacco use.  Negative for alcohol use.  Negative for drug use.   Marital Status:   " [5]     Review of Systems   Unable to perform ROS: Dementia   Constitutional: Negative for fever.   Cardiovascular: Positive for leg swelling.   Skin: Positive for color change (erythema) and wound.       Physical Exam     Patient Vitals for the past 24 hrs:   BP Temp Temp src Pulse Resp SpO2 Height Weight   10/25/19 1020 (!) 205/79 97.7  F (36.5  C) Oral 56 16 100 % 1.702 m (5' 7\") 46.7 kg (103 lb)        Physical Exam  Vitals: reviewed by me  General: Pt seen on hospital [facility], cooperative, and alert to conversation  Eyes: Tracking well, clear conjunctiva BL  ENT: MMM, midline trachea.   Lungs:   No tachypnea, no accessory muscle use. No respiratory distress.   CV: Rate as above, regular rhythm.    Abd: Soft, non tender, no guarding, no rebound. Non distended  MSK: no peripheral edema or joint effusion.  No evidence of trauma  Right lower extremity does have a dime size shaped ulcer that goes clearly under the skin to the tibial surface.  This is with significant undermining of the surrounding skin, and it creates what appears to be a subacute appearing air pocket.  This drains serosanguineous fluid, no evidence of an abscess.  Does have mild erythema and tenderness to palpation surrounding the ulcer, no bullae.  No streaking.  Skin: No rash, normal turgor and temperature  Neuro: Clear speech and no facial droop.  Psych: Not RIS, no e/o AH/VH      Emergency Department Course   Imaging:  Radiographic findings were communicated with the patient and son who voiced understanding of the findings.  XR Tibia & Fibula 2 views, Right:   Soft tissue gas in the anterolateral aspect of the mid  leg. Osteopenia. No apparent osseous destruction or periosteal  reaction. Arterial calcifications are noted within the leg, as per radiology.     Laboratory:  CBC: WBC: 7.9, HGB: 8.2 (L), PLT: 392  BMP: BUN 40 (H), Creatinine 1.13 (H), GFR 43 (L), Calcium 8.0 (L), o/w WNL     CRP inflammation: 59.4 (H)  ESR: 116 " (H)    Blood cultures (X2): pending     Interventions:  1443 Rocephin 2 g IV    Emergency Department Course:  Nursing notes and vitals reviewed. (1214) I performed an exam of the patient as documented above.     IV inserted. Medicine administered as documented above. Blood drawn. This was sent to the lab for further testing, results above.    The patient was sent for a right tibia and fibula x-ray while in the emergency department, findings above.     (1420)  I consulted with Dr. Mclean of the hospitalist services. They are in agreement to accept the patient for admission pending general surgery consult.    (1430) I consulted with Dr. Fidelina Russo, General Surgery, regarding the patient's history and presentation here in the emergency department. She agreed to see the patient to assess for possible debridement.     Findings and plan explained to the Patient who consents to admission. Discussed the patient with Dr. Mclean, who will admit the patient to an inpatient med [facility] bed for further monitoring, evaluation, and treatment.    Impression & Plan    Medical Decision Making:  Kalli Brown is a very pleasant 89 year old female who presents to the emergency department for a wound check, and I will note that she does have what appears to be a developing ulcer on the right anterior tibial area of the right lower extremity. I see minimal evidence of surrounding erythema, but what concerns me most is that this may actually probe to bone, as it is quite deep. I am concerned for osteomyelitis and do think she should be admitted for antibiotics and an MRI. Her ESR is elevated, which supports this diagnosis as well. I do not think this is necrotizing fasciitis, as I think the reason she has air in her x-ray is that it is an open ulcer with significantly undermined tissue. This may have been a fistula that formed after her hematoma, as a way for her body to drained. Regardless, I spoke with Dr. Mclean, who  generously agreed to accept the patient to the medical floor for an MRI. Also at his request, I spoke with Dr. Russo of General Surgery, who also agreed to see the wound as well to sure no debridement was necessary. Will plan for admission and supportive care as above.    Diagnosis:    ICD-10-CM    1. Osteomyelitis, unspecified site, unspecified type (H) M86.9    2. Skin ulcer, unspecified ulcer stage (H) L98.499        Disposition:  Admitted to Dr. Mclean    Scribe Disclosure:  I, Becca Fernandez, am serving as a scribe on 10/25/2019 at 12:14 PM to personally document services performed by Steve Lee* based on my observations and the provider's statements to me.     Becca Fernandez  10/25/2019    EMERGENCY DEPARTMENT       Steve Lee MD  10/25/19 0491

## 2019-10-25 NOTE — H&P
[facility]    History and Physical - Hospitalist Service       Date of Admission:  10/25/2019    Assessment & Plan   Kalli Brown is a 89 year old female with a history of dementia, multiple myeloma, MPGN, HTN and CKD stage III who presented to the ED on 10/25/2019 with a right shin wound after a mechanical fall     Right shin wound  Approximately 3 weeks ago the patient fell out of her wheel chair and scraped her right shin. On exam there is visible bleeding in the wound.  A x-ray of the tib/fib showed soft tissue gas but suspect this is more likely due to fistula tracting.    - Admission to medical bed  - MRI of the lower leg ordered  - Follow cultures  - CBC in AM   - Continue IV antibiotics with Ceftriaxone and Vancomycin with pharmacy to dose  - WOC consult placed   - Surgery consulted and appreciate their recommendations     HTN   Blood pressures have been elevated while here and it is unclear if the patient has been taking her blood pressure medications  - PTA Norvasc and Diovan  - PRN Hydralazine     Dementia  At her baseline who was present at bed side.  Currently resides in care home  - Continue to monitor    CKD stage III   H/o MPGN   Baseline Cr appears to be 1 and was 1.13 on presentation   - Will monitor as needed     Chronic anemia  Hgb 8.2on admission and baseline appears to be 7.5-9  - Continue to monitor with repeat CBC in AM        Diet: Regular diet  DVT Prophylaxis: Pneumatic Compression Devices  Cassidy Catheter: not present  Code Status: Full code.  Discussed with son who states that they have been trying to get the patient to change code status but has been unsuccessful     Disposition Plan   Expected discharge: 3-4 days.  May need TCU at discharge   Entered: Foreign Mclean DO 10/25/2019, 3:07 PM     The patient's care was discussed with the Patient, Patient's Family and ED provider.    Foreign Mclean DO  [facility]  Hospital    ______________________________________________________________________    Chief Complaint   Right shin wound     History is limited due to the patient's baseline dementia.  Most of the history was obtained from the son and ED staff     History of Present Illness   Kalli Brown is a 89 year old female who presented with a right shin wound after a mechanical fall.  Approximately 3 weeks ago the patient fell out of her wheel chair and scraped her leg.  She was initially seen in the hospital and discharged.  Since then the patient's facility has been monitoring a hematoma to the right shin and they noticed development of a possible ulcer to the hematoma.  Then yesterday they noticed bleeding from the wound and the patient kept soaking through bandages.  The patient was then brought in today by her son who is an ED physician as they were concerned of a possible infection as the area was more tender and erythematous.  No fevers or chills noted.  On my evaluation the patient denies any pain.  No chest pain, SOB.  No recent N/V/D noted.       Review of Systems    Review of systems not obtained due to patient factors - patient's baseline dementia    Past Medical History    I have reviewed this patient's medical history and updated it with pertinent information if needed.   Past Medical History:   Diagnosis Date     Anemia      Deafness      Dislocated shoulder     bilateral, chronic     Edema      Glaucoma      Hypertension      Multiple myeloma (H)     with light chain deposition disease     Proteinuria     normal renal function     Shoulder dislocation      Trigeminal neuralgia        Past Surgical History   I have reviewed this patient's surgical history and updated it with pertinent information if needed.  Past Surgical History:   Procedure Laterality Date     EYE SURGERY       GYN SURGERY       ORTHOPEDIC SURGERY       PHACOEMULSIFICATION CLEAR CORNEA WITH STANDARD INTRAOCULAR LENS IMPLANT   2014    Procedure: PHACOEMULSIFICATION CLEAR CORNEA WITH STANDARD INTRAOCULAR LENS IMPLANT;  Surgeon: Doe Liu MD;  Location: [facility]       Social History   I have reviewed this patient's social history and updated it with pertinent information if needed.  Social History     Tobacco Use     Smoking status: Never Smoker     Smokeless tobacco: Never Used   Substance Use Topics     Alcohol use: No     Alcohol/week: 0.0 standard drinks     Drug use: No       Family History   I have reviewed this patient's family history and updated it with pertinent information if needed.   Brother- heart disease     Prior to Admission Medications   Prior to Admission Medications   Prescriptions Last Dose Informant Patient Reported? Taking?   Vitamin D, Cholecalciferol, 1000 units TABS 10/25/2019 at am Nursing Home No Yes   Sig: Take 1 tablet (1,000 Units) by mouth daily   acetaminophen (TYLENOL) 500 MG tablet 10/25/2019 at 0800 Nursing Home No Yes   Sig: Take 2 tablets (1,000 mg) by mouth 3 times daily   amLODIPine (NORVASC) 10 MG tablet 10/25/2019 at am Nursing Home No Yes   Sig: Take 1 tablet (10 mg) by mouth daily   aspirin (ASPIRIN LOW DOSE) 81 MG chewable tablet 10/25/2019 at am Nursing Home No Yes   Si TAB BY MOUTH DAILY (DX:ANTICOAGULATION)   calcium citrate-vitamin D (CITRACAL) 315-250 MG-UNIT TABS per tablet 10/25/2019 at am Nursing Home No Yes   Sig: Take 1 tablet by mouth 2 times daily -Take with food   carBAMazepine (TEGRETOL) 200 MG tablet 10/25/2019 at am Nursing Home Yes Yes   Sig: Take 200 mg by mouth every morning   carBAMazepine (TEGRETOL) 200 MG tablet  Nursing Home Yes Yes   Sig: Take 300 mg by mouth every evening   docusate sodium (COLACE) 250 MG capsule  Nursing Home No Yes   Sig: Take 1 capsule (250 mg) by mouth daily as needed for constipation   ferrous sulfate (FEROSUL) 325 (65 Fe) MG tablet 10/25/2019 at am Nursing Home No Yes   Sig: Take 1 tablet (325 mg) by mouth 2 times daily (with meals)    furosemide (LASIX) 40 MG tablet 10/25/2019 at am Nursing Home No Yes   Sig: Take 1 tablet (40 mg) by mouth daily   latanoprost (XALATAN) 0.005 % ophthalmic solution  Nursing Home No Yes   Sig: Place 1 drop into both eyes At Bedtime   menthol (HALLS COUGH DROPS) 7 MG LOZG  Nursing Home Yes Yes   Sig: Take 1 lozenge by mouth every 2 hours as needed for cough    mirtazapine (REMERON) 15 MG tablet  Nursing Home No Yes   Sig: Take 1 tablet (15 mg) by mouth At Bedtime   order for DME  Nursing Home No No   Sig: Equipment being ordered: Incentive Spirometer   polyethylene glycol (MIRALAX/GLYCOLAX) packet 10/25/2019 at am Nursing Home Yes Yes   Sig: Take 1 packet by mouth daily   sertraline (ZOLOFT) 100 MG tablet 10/25/2019 at am Nursing Home No Yes   Sig: Take 1 tablet (100 mg) by mouth daily   timolol maleate (TIMOPTIC) 0.5 % ophthalmic solution 10/25/2019 at am Nursing Home Yes Yes   Sig: Place 1 drop into the right eye daily   traMADol (ULTRAM) 50 MG tablet  Nursing Home Yes Yes   Sig: Take 50 mg by mouth every 6 hours as needed for severe pain   valsartan-hydrochlorothiazide (DIOVAN HCT) 320-12.5 MG tablet 10/25/2019 at am Nursing Home No Yes   Sig: Take 1 tablet by mouth daily      Facility-Administered Medications: None     Allergies   Allergies   Allergen Reactions     Atorvastatin        Physical Exam   Vital Signs: Temp: 97.7  F (36.5  C) Temp src: Oral BP: (!) 205/79 Pulse: 56   Resp: 16 SpO2: 100 % O2 Device: None (Room air)    Weight: 103 lbs 0 oz    General Appearance: Resting comfortably.  NAD   Eyes: EOMI.  Normal conjunctiva  HEENT: NC/AT.  Moist mucous membranes  Respiratory: Clear to auscultation.  No respiratory distress  Cardiovascular: RRR.  No murmurs  GI: Bowel sounds present.  Non-tender  Skin: Right shin wound noted.  There appears to be a fistula with blood noted to the wound.  Surrounding erythema but no warmth  Musculoskeletal:  No edema.  No calf tenderness  Neurologic: No focal deficits.  CN  appear intact.  Hard of hearing   Psychiatric: Alert.  Pleasant.  Normal affect    Data   Data reviewed today: I reviewed all medications, new labs and imaging results over the last 24 hours. I personally reviewed   X-ray right tib/fib:  Soft tissue gas noted     Recent Labs   Lab 10/25/19  1253   WBC 7.9   HGB 8.2*   *         POTASSIUM 4.5   CHLORIDE 109   CO2 26   BUN 40*   CR 1.13*   ANIONGAP 6   EBONY 8.0*   GLC 95     Recent Results (from the past 24 hour(s))   XR Tibia & Fibula Right 2 Views    Narrative    RIGHT TIBIA AND FIBULA TWO VIEWS 10/25/2019 1:21 PM     HISTORY: Possible tibial osteopenia.      Impression    IMPRESSION: Soft tissue gas in the anterolateral aspect of the mid  leg. Osteopenia. No apparent osseous destruction or periosteal  reaction. Arterial calcifications are noted within the leg.    MELA LU MD

## 2019-10-25 NOTE — ED TRIAGE NOTES
Pt fell out of her wheelchair on 10/5 and was seen in the ED. Had a hematoma on right lateral lower leg from the fall. Now has an open wound that's sore and draining.

## 2019-10-25 NOTE — PROGRESS NOTES
RECEIVING UNIT ED HANDOFF REVIEW    ED Nurse Handoff Report was reviewed by: Janice Atkins RN on October 25, 2019 at 3:07 PM

## 2019-10-25 NOTE — ED NOTES
"[facility]  ED Nurse Handoff Report    ED Chief complaint: Wound Check      ED Diagnosis:   Final diagnoses:   Osteomyelitis, unspecified site, unspecified type (H)   Skin ulcer, unspecified ulcer stage (H)       Code Status: see epic    Allergies:   Allergies   Allergen Reactions     Atorvastatin        Activity level - Baseline/Home:  wheelchair  Activity Level - Current:   Unable to Assess, pt has not been out of bed    Patient's Preferred language: english   Needed?: No    Isolation: No  Infection: Not Applicable  Bariatric?: No    Vital Signs:   Vitals:    10/25/19 1020   BP: (!) 205/79   Pulse: 56   Resp: 16   Temp: 97.7  F (36.5  C)   TempSrc: Oral   SpO2: 100%   Weight: 46.7 kg (103 lb)   Height: 1.702 m (5' 7\")       Cardiac Rhythm: ,        Pain level:      Is this patient confused?: No   Does this patient have a guardian?  No         If yes, is there guardianship documents in the Epic \"Code/ACP\" activity?  N/A         Guardian Notified?  NA  [place] - Suicide Severity Rating Scale Completed?  Yes  If yes, what color did the patient score?  White    Patient Report: Initial Complaint: pt had a fall quite sometime ago with a hematoma to the right shin, xrays were negative at the time. At some point the pt developed a ulcer to the site. And yesterday it started to bleed. Pt's son is an ED MD and visited today and brought pt to ED for eval for osteomylitis/ infection  Focused Assessment: AOX4, but very [place].open ulceration to right lateral shin. Bleeding.pt denies pain. BP high, long hx of HTN   Tests Performed: labs, xray , BC x2  Abnormal Results: ^sed, ^ crp  Treatments provided: started rocephin    Family Comments: son at bedside.    OBS brochure/video discussed/provided to patient/family: N/A              Name of person given brochure if not patient: na              Relationship to patient: na    ED Medications:   Medications   cefTRIAXone (ROCEPHIN) 2 g vial to attach to NS " 100 ml bag for ADULTS or NS 50 ml bag for PEDS (2 g Intravenous New Bag 10/25/19 1443)       Drips infusing?:  No    For the majority of the shift this patient was Green.   Interventions performed were none.    Severe Sepsis OR Septic Shock Diagnosis Present: No    To be done/followed up on inpatient unit:  continued care    ED NURSE PHONE NUMBER: [phone]

## 2019-10-25 NOTE — PHARMACY-ADMISSION MEDICATION HISTORY
Pharmacy Medication History  Admission medication history interview status for the 10/25/2019  admission is complete. See EPIC admission navigator for prior to admission medications     Medication history sources: MAR (Julianne Bravo)  Medication history source reliability: Moderate  Adherence assessment: Good    Significant changes made to the medication list:  none      Additional medication history information:   Only have doses that were given this am    Medication reconciliation completed by provider prior to medication history? No    Time spent in this activity: 25 minutes      Prior to Admission medications    Medication Sig Last Dose Taking? Auth Provider   acetaminophen (TYLENOL) 500 MG tablet Take 2 tablets (1,000 mg) by mouth 3 times daily 10/25/2019 at 0800 Yes Man Stovall MD   amLODIPine (NORVASC) 10 MG tablet Take 1 tablet (10 mg) by mouth daily 10/25/2019 at am Yes Man Stovall MD   aspirin (ASPIRIN LOW DOSE) 81 MG chewable tablet 1 TAB BY MOUTH DAILY (DX:ANTICOAGULATION) 10/25/2019 at am Yes Man Stovall MD   calcium citrate-vitamin D (CITRACAL) 315-250 MG-UNIT TABS per tablet Take 1 tablet by mouth 2 times daily -Take with food 10/25/2019 at am Yes Man Stovall MD   carBAMazepine (TEGRETOL) 200 MG tablet Take 200 mg by mouth every morning 10/25/2019 at am Yes Unknown, Entered By History   carBAMazepine (TEGRETOL) 200 MG tablet Take 300 mg by mouth every evening  Yes Unknown, Entered By History   docusate sodium (COLACE) 250 MG capsule Take 1 capsule (250 mg) by mouth daily as needed for constipation  Yes Man Stovall MD   ferrous sulfate (FEROSUL) 325 (65 Fe) MG tablet Take 1 tablet (325 mg) by mouth 2 times daily (with meals) 10/25/2019 at am Yes Man Stovall MD   furosemide (LASIX) 40 MG tablet Take 1 tablet (40 mg) by mouth daily 10/25/2019 at am Yes Man Stovall MD   latanoprost (XALATAN) 0.005 % ophthalmic  solution Place 1 drop into both eyes At Bedtime  Yes Man Stovall MD   menthol (HALLS COUGH DROPS) 7 MG LOZG Take 1 lozenge by mouth every 2 hours as needed for cough   Yes Man Stovall MD   mirtazapine (REMERON) 15 MG tablet Take 1 tablet (15 mg) by mouth At Bedtime  Yes Chavo Springer MD   polyethylene glycol (MIRALAX/GLYCOLAX) packet Take 1 packet by mouth daily 10/25/2019 at am Yes Unknown, Entered By History   sertraline (ZOLOFT) 100 MG tablet Take 1 tablet (100 mg) by mouth daily 10/25/2019 at am Yes Man Stovall MD   timolol maleate (TIMOPTIC) 0.5 % ophthalmic solution Place 1 drop into the right eye daily 10/25/2019 at am Yes Unknown, Entered By History   traMADol (ULTRAM) 50 MG tablet Take 50 mg by mouth every 6 hours as needed for severe pain  Yes Unknown, Entered By History   valsartan-hydrochlorothiazide (DIOVAN HCT) 320-12.5 MG tablet Take 1 tablet by mouth daily 10/25/2019 at am Yes Man Stovall MD   Vitamin D, Cholecalciferol, 1000 units TABS Take 1 tablet (1,000 Units) by mouth daily 10/25/2019 at am Yes Man Stovall MD   order for DME Equipment being ordered: Incentive Spirometer   Man Stovall MD

## 2019-10-25 NOTE — PHARMACY-VANCOMYCIN DOSING SERVICE
Pharmacy Vancomycin Initial Note  Date of Service 2019  Patient's  1929  89 year old, female    Indication: Skin and Soft Tissue Infection    Current estimated CrCl = Estimated Creatinine Clearance: 24.4 mL/min (A) (based on SCr of 1.13 mg/dL (H)).    Creatinine for last 3 days  10/25/2019: 12:53 PM Creatinine 1.13 mg/dL    Recent Vancomycin Level(s) for last 3 days  No results found for requested labs within last 72 hours.      Vancomycin IV Administrations (past 72 hours)      No vancomycin orders with administrations in past 72 hours.                Nephrotoxins and other renal medications (From now, onward)    Start     Dose/Rate Route Frequency Ordered Stop    10/25/19 1630  vancomycin (VANCOCIN) 1000 mg in dextrose 5% 200 mL PREMIX      1,000 mg  200 mL/hr over 1 Hours Intravenous EVERY 48 HOURS 10/25/19 1622            Contrast Orders - past 72 hours (72h ago, onward)    None                Plan:  1.  Start vancomycin  1000 mg IV q48h.   2.  Goal Trough Level: 15-20 mg/L   3.  Pharmacy will check trough levels as appropriate in 1-3 Days.    4. Serum creatinine levels will be ordered daily for the first week of therapy and at least twice weekly for subsequent weeks.    5. [place] method utilized to dose vancomycin therapy: Method 1    Amaris Palmer RPH

## 2019-10-26 NOTE — PLAN OF CARE
[id number]:  Alert to self, confused at times.  Turned and repositioned.  Incontinent of urine.  Denies pain.  Wound covered with mepilex.  Patient had a good appetite and ate 75% dinner, hamburger, fries, potato soup.

## 2019-10-26 NOTE — PROGRESS NOTES
[facility]    Medicine Progress Note - Hospitalist Service       Date of Admission:  10/25/2019  Assessment & Plan   Kalli Brown is a 89 year old female with a history of dementia, multiple myeloma, MPGN, HTN and CKD stage III who presented to the ED on 10/25/2019 with a right shin wound after a mechanical fall     Right shin wound  Approximately 3 weeks ago the patient fell out of her wheel chair and scraped her right shin. On exam there is visible bleeding in the wound.  A x-ray of the tib/fib showed soft tissue gas but suspect this is more likely due to fistula tracting.    - MRI results pending   - Follow cultures  - Continue IV antibiotics with Ceftriaxone and Vancomycin with pharmacy to dose  - WOC consult placed   - Surgery consulted and appreciate their recommendations     HTN   Blood pressures have been elevated while here and it is unclear if the patient has been taking her blood pressure medications  - PTA Norvasc and Diovan  - PRN Hydralazine     Dementia  Delirium   At her baseline who was present at bed side.  Currently resides in [facility]  Over night having issues with delirium requiring PRN Zyprexa  - Continue to monitor  - Delirium precautions in place  - Psychiatry consulted    CKD stage III   H/o MPGN   Baseline Cr appears to be 1 and was 1.13 on presentation   - Will monitor as needed     Chronic anemia  Hgb 8.2 on admission and baseline appears to be 7.5-9.  On recheck was 7.6.   - Monitor as needed          Diet: Regular Diet Adult  Room Service    DVT Prophylaxis: Pneumatic Compression Devices  Cassidy Catheter: not present  Code Status: Full Code      Disposition Plan   Expected discharge: 2 - 3 days, recommended to prior living arrangement once infection treated and bleeding controlled.  Entered: Foreign Mclean DO 10/26/2019, 10:16 AM       The patient's care was discussed with the Bedside Nurse, Patient and Patient's Family.    Foreign Mclean DO  Hospitalist  Service  [facility]    ______________________________________________________________________    Interval History   Patient seen and examined.  Over night having issues with delirium.  No fevers or chills.  No pain at this time.     Data reviewed today: I reviewed all medications, new labs and imaging results over the last 24 hours. I personally reviewed no images or EKG's today.    Physical Exam   Vital Signs: Temp: 97.5  F (36.4  C) Temp src: Oral BP: (!) 165/82 Pulse: 70   Resp: 18 SpO2: 93 % O2 Device: None (Room air)    Weight: 103 lbs 0 oz  General Appearance: Resting comfortably.  NAD   Respiratory: Clear to auscultation.  No respiratory distress  Cardiovascular: RRR.  No murmurs  GI: Bowel sounds present.  Non-tender  Skin: Dressing in place to right wound.  No obvious rashes  Other: Alert.  Oriented to person, place, month and year but does not know why she is in the hospital or what the plan is     Data   Recent Labs   Lab 10/26/19  0618 10/25/19  1253   WBC 12.3* 7.9   HGB 7.6* 8.2*    104*    392   NA  --  141   POTASSIUM  --  4.5   CHLORIDE  --  109   CO2  --  26   BUN  --  40*   CR 1.18* 1.13*   ANIONGAP  --  6   EBONY  --  8.0*   GLC  --  95     Recent Results (from the past 24 hour(s))   MR Tibia Fibula Lower Leg Right wo Contrast    Narrative    MR TIBIA FIBULA LOWER LEG RIGHT WITHOUT CONTRAST 10/26/2019 1:25 PM     HISTORY: Shin hematoma after an injury. Now with ulcer in the region.  Evaluate for osteomyelitis.    TECHNIQUE: Multiplanar multisequence MRI without contrast. Contrast  could not be given due to lack of venous access. The distal lower leg  is not seen due to artifact which is probably motion artifact.    COMPARISON: None.    FINDINGS: No acute or chronic bony abnormality. In particular, there  is no evidence for osteomyelitis of the right tibia or fibula. The  patient's known area of injury is seen in the anterior lateral mid  right lower leg. The  subcutaneous tissues are swollen and there is a  linear focus of increased T2 signal in this area suggesting a small  fluid collection. The findings are consistent with a known  subcutaneous hematoma but is difficult to differentiate a bland  hematoma from an infected hematoma. This area measures up to 8.0 cm  craniocaudad dimension, 3.1 cm anterior posterior dimension and 1.0 cm  in thickness (images 37 through 47 of series 701 and images 10 through  16 of series 601). There is suggestion of an overlying shallow ulcer.  There is no involvement of deeper muscle compartments. The remainder  of the soft tissues in the visualized right lower leg are  unremarkable. Incidental note of a moderate-sized popliteal cyst at  the right knee. The left lower leg is included in the field-of-view  and shows no significant bony or soft tissue abnormality.      Impression    IMPRESSION:  1. Subcutaneous thickening and small amount of nonspecific fluid in  the anterior lateral mid right lower leg consistent with known  posttraumatic subcutaneous hematoma. An infected hematoma cannot be  excluded.  2. No evidence for osteomyelitis in the adjacent bones.  3. Right popliteal cyst.

## 2019-10-26 NOTE — PLAN OF CARE
Pt confused. Unable to do MRI due to agitation and restless. Rescheduled AM. VSS on ra. Turn/reposition q2hrs. Annabel-wick in place. One dose IM Zyprex given. Will continue to monitor.

## 2019-10-26 NOTE — PLAN OF CARE
DATE & TIME: [id number]   Cognitive Concerns/ Orientation : A&O, forgetful, baseline dementia   BEHAVIOR & AGGRESSION TOOL COLOR: Green   ABNL VS/O2: BP elevated, given PRN hydralazine on RA. Bradycardic.   MOBILITY: Total care, T&Rq2hrs. Baseline Wheelchair bound  PAIN MANAGMENT: NA  DIET: Regular  BOWEL/BLADDER: Incontinent of B&B, PurWic in place  ABNL LAB/BG: CRP and sed rate elevated  DRAIN/DEVICES: R PIV and PurWic  SKIN: R anterior shin open wound  TESTS/PROCEDURES: Plan for MRI of R leg  D/C DAY/GOALS/PLACE: D  OTHER IMPORTANT INFO: FREDDY.

## 2019-10-26 NOTE — PROGRESS NOTES
Surgery    Patient KAYLA during rounds. Will follow up in AM.    Marlon Carrizales M.D.  [facility]  [phone]

## 2019-10-26 NOTE — PROGRESS NOTES
Paged by RN for increased agitation. Chart reviewed.   -- Delirium prevention protocol placed, PRN Zyprexa available for agitation (RN does not think pt would be cooperative with oral regimen)

## 2019-10-26 NOTE — PLAN OF CARE
Pt cooperative most of the shift. Sons were at the bedside. Refused IV restart earlier. Refused breakfast and lunch. Encouraged po fluids. Incontinent of urine. Dressing on R shin wound in place. On IV antibiotic. Continue to monitor.

## 2019-10-27 NOTE — PLAN OF CARE
[id number]:  A&OX3, disoriented to time, forgetful and confused at times.  Turn and reposition.  Incontinent of urine.  Pills in pudding.  Denies pain.  Eating dinner now.  Mepilex to right shin wound.

## 2019-10-27 NOTE — PROGRESS NOTES
[facility]    Medicine Progress Note - Hospitalist Service       Date of Admission:  10/25/2019  Assessment & Plan   Kalli Brown is a 89 year old female with a history of dementia, multiple myeloma, MPGN, HTN and CKD stage III who presented to the ED on 10/25/2019 with a right shin wound after a mechanical fall     Right shin wound  Approximately 3 weeks ago the patient fell out of her wheel chair and scraped her right shin. On exam there is visible bleeding in the wound.  A x-ray of the tib/fib showed soft tissue gas but suspect this is more likely due to fistula tracting.    MRI of the leg was done without evidence of osteomyelitis   - Follow cultures  - Initially was treated with IV antibiotics with Ceftriaxone and Vancomycin with pharmacy to dose.  Will transition to PO Keflex given no evidence of osteomyelitis and no known history of MRSA   - WOC consult placed   - Surgery consulted and appreciate their recommendations     HTN   Blood pressures have been elevated while here and it is unclear if the patient has been taking her blood pressure medications  - PTA Norvasc and Diovan  - PRN Hydralazine     Dementia  Delirium   At her baseline who was present at bed side.  Currently resides in [facility]  Over night having issues with delirium requiring PRN Zyprexa  - Continue to monitor  - Delirium precautions in place  - Started Zyprexa 5 mg ODT at bedtime   - Psychiatry consulted and appreciate their recommendations     CKD stage III   H/o MPGN   Baseline Cr appears to be 1 and was 1.13 on presentation   - Will monitor as needed     Chronic anemia  Hgb 8.2 on admission and baseline appears to be 7.5-9.  On recheck was 7.6.   - Monitor as needed        Diet: Regular Diet Adult  Room Service    DVT Prophylaxis: Pneumatic Compression Devices  Cassidy Catheter: not present  Code Status: Full Code      Disposition Plan   Expected discharge: 1-2 days, recommended to prior living arrangement if able to  handle dressing changes.   Entered: Foreign Mclean DO 10/27/2019, 11:04 AM       The patient's care was discussed with the Bedside Nurse, Care Coordinator/, Patient and Patient's Family.    Foreign Mclean DO  Hospitalist Service  [facility]    ______________________________________________________________________    Interval History   Patient seen and examined. Continues to have issues with agitation, especially in the AM.  No pain or difficulty breathing on my evaluation.  No fevers or chills.  No cough.  Tolerating diet.     Data reviewed today: I reviewed all medications, new labs and imaging results over the last 24 hours. I personally reviewed no images or EKG's today.    Physical Exam   Vital Signs: Temp: 97.4  F (36.3  C) Temp src: Oral BP: (!) 165/84 Pulse: 54   Resp: 16 SpO2: 93 % O2 Device: None (Room air)    Weight: 103 lbs 0 oz  General Appearance: Resting comfortably.  NAD   Respiratory: Clear to auscultation.  No respiratory distress  Cardiovascular: RRR.  No murmurs  GI: Bowel sounds present.  Non-tender  Skin: Right leg wound dressing in place  Other: No edema.  No calf tenderness     Data   Recent Labs   Lab 10/26/19  0618 10/25/19  1253   WBC 12.3* 7.9   HGB 7.6* 8.2*    104*    392   NA  --  141   POTASSIUM  --  4.5   CHLORIDE  --  109   CO2  --  26   BUN  --  40*   CR 1.18* 1.13*   ANIONGAP  --  6   EBONY  --  8.0*   GLC  --  95     No results found for this or any previous visit (from the past 24 hour(s)).

## 2019-10-27 NOTE — PROGRESS NOTES
Surgery    No complaints. No fevers    Right shin- Small hematoma. Small opening with serous drainage. No significant erythema. No tenderness.    A/P  Right shin wound stable. No need for surgical intervention. [facility] consult for further recommendations regarding dressing changes. Discharge per medical team.    Marlon Carrizales M.D.  [facility]  [phone]

## 2019-10-27 NOTE — PLAN OF CARE
[id number]: Patient alert and oriented, but confused with rambling speech. VSS except for elevated BP. Denies pain. Turning and repositioning over night, patient became very agitated when turned, yelling and screaming, but then returned to sleep. Incontinent of bowel and bladder. R shin wound dressing CDI, with surrounding erythema, cms intact. Tolerating regular diet, encouraging fluids. Discharge pending psych and WOC consult.

## 2019-10-27 NOTE — PLAN OF CARE
Pt calm and cooperative most of the shift. Ate lunch. Encouraged po fluids. Incontinent of urine. Turned and repositioned for comfort. Dressing on R shin replaced. Antibiotic switched to po. Continue to monitor.

## 2019-10-28 NOTE — PLAN OF CARE
"Discharge Planner PT   Patient plan for discharge: back to [facility]  Current status: PT - eval completed and treatment initiated. Pt is a 89 y/o female admitted with worsening R shin wound (sustained originally with fall out of w/c at [facility] 3 weeks ago). Pt is alert and cooperative, very [place], reports that she transfers with 1 assist bed to w/ at [facility], chart corroborates this. Pt hasn't walked \"in years.\" Currently, mildly deconditioned d/t prolonged illness and time in bed. Able to transfer bed to [facility] with mod assist 1 and min assist of another for safety. Pt is below baseline for mobility, we will cont PT to address deconditioning and work to help pt complete transfers with assist 1.  Barriers to return to prior living situation: currently requires 1-2 assist for mobility  Recommendations for discharge: return to [facility] if they can provide level of assist needed, with home PT  Rationale for recommendations: Pt is a little below baseline for mobility, if [facility] can provide 2 assist with transfers initially, anticipate she would soon be back to her baseline of 1 assist for transfers. If [facility] cannot provide this level of assist, would recommend TCU.       Entered by: Janina Mitchell 10/28/2019 10:42 AM       "

## 2019-10-28 NOTE — PROGRESS NOTES
10/28/19 1000   Quick Adds   Type of Visit Initial PT Evaluation   Living Environment   Lives With facility resident   Living Arrangements assisted living   Home Accessibility no concerns   Transportation Anticipated family or friend will provide   Living Environment Comment pt lives at [facility]   Self-Care   Usual Activity Tolerance fair   Current Activity Tolerance poor   Regular Exercise No   Equipment Currently Used at Home wheelchair, manual   Functional Level Prior   Ambulation 4-->completely dependent   Transferring 2-->assistive person   Toileting 3-->assistive equipment and person   Bathing 3-->assistive equipment and person   Communication 0-->understands/communicates without difficulty   Swallowing 0-->swallows foods/liquids without difficulty   Cognition 1 - attention or memory deficits   Fall history within last six months yes   Number of times patient has fallen within last six months 7   Which of the above functional risks had a recent onset or change? fall history   Prior Functional Level Comment Pt is w/c dependent at baseline, transfers bed to [facility] with 1 assist at [facility]   General Information   Onset of Illness/Injury or Date of Surgery - Date 10/25/19   Referring Physician Dr Foreign Mclean   Patient/Family Goals Statement no family present, pt reports plan to go back to [facility]   Pertinent History of Current Problem (include personal factors and/or comorbidities that impact the POC) Pt is a 89 y/o female who fell out of her w/c 3 weeks ago and sustained wound to R shin. She was hospitalized briefly and sent back to [facility], back on 10/25 with increased erythema and concern for infection. PMH includes dementia, multiple myeloma, HTN, CKDIII. Pt reports long h/o shoulder problems, has limited use of UEs.   Precautions/Limitations fall precautions   Weight-Bearing Status - LUE full weight-bearing   Weight-Bearing Status - RUE full weight-bearing   General Observations pleasant and cooperative   General  Info Comments activity - up with assist.   Cognitive Status Examination   Orientation orientation to person, place and time  (knew Oct 2019)   Level of Consciousness alert   Follows Commands and Answers Questions 100% of the time;able to follow single-step instructions   Personal Safety and Judgment impulsive   Memory impaired   Cognitive Comment pt has diagnosis of dementia, has been aggressive toward staff but better past 24 hours.   Pain Assessment   Patient Currently in Pain No   Integumentary/Edema   Integumentary/Edema Comments wound R shin, covered with bandage   Posture    Posture Forward head position;Protracted shoulders;Kyphosis   Range of Motion (ROM)   ROM Comment   (BLE WFL, UE's decreased d/t chronic shoulder issues.)   Strength   Strength Comments Decreased strength all 4 extremities, grossly 4-/5   Bed Mobility   Bed Mobility Comments min assist 2   Transfer Skills   Transfer Comments sit to stand with mod assist 2   Gait   Gait Comments unable - hasn't walked for years   Balance   Balance Comments steady on EOB, supervision   Sensory Examination   Sensory Perception no deficits were identified   Coordination   Coordination no deficits were identified   Muscle Tone   Muscle Tone no deficits were identified   General Therapy Interventions   Planned Therapy Interventions strengthening;transfer training   Clinical Impression   Criteria for Skilled Therapeutic Intervention yes, treatment indicated   PT Diagnosis impaired functional mobility   Influenced by the following impairments generalized deconditioning, weakness   Functional limitations due to impairments increased caregiver burden, unable to transfer with 1 assist currently   Clinical Presentation Stable/Uncomplicated   Clinical Presentation Rationale per clinical judgment   Clinical Decision Making (Complexity) Low complexity   Therapy Frequency 5x/week   Predicted Duration of Therapy Intervention (days/wks) 3 days   Anticipated Discharge  "Disposition   (return to [facility])   Risk & Benefits of therapy have been explained Yes   Patient, Family & other staff in agreement with plan of care Yes   [facility] TM \"6 Clicks\"   2016, Trustees of [facility], under license to [employer].  All rights reserved.   6 Clicks Short Forms Basic Mobility Inpatient Short Form   [facility] AM-PAC  \"6 Clicks\" V.2 Basic Mobility Inpatient Short Form   1. Turning from your back to your side while in a flat bed without using bedrails? 3 - A Little   2. Moving from lying on your back to sitting on the side of a flat bed without using bedrails? 3 - A Little   3. Moving to and from a bed to a chair (including a wheelchair)? 2 - A Lot   4. Standing up from a chair using your arms (e.g., wheelchair, or bedside chair)? 2 - A Lot   5. To walk in hospital room? 1 - Total   6. Climbing 3-5 steps with a railing? 1 - Total   Basic Mobility Raw Score (Score out of 24.Lower scores equate to lower levels of function) 12   Total Evaluation Time   Total Evaluation Time (Minutes) 15     "

## 2019-10-28 NOTE — PROGRESS NOTES
[facility]    Medicine Progress Note - Hospitalist Service       Date of Admission:  10/25/2019  Assessment & Plan   Kalli Brown is a 89 year old female with a history of dementia, multiple myeloma, MPGN, HTN and CKD stage III who presented to the ED on 10/25/2019 with a right shin wound after a mechanical fall     Right shin wound  Approximately 3 weeks ago the patient fell out of her wheel chair and scraped her right shin. On exam there is visible bleeding in the wound.  A x-ray of the tib/fib showed soft tissue gas but suspect this is more likely due to fistula tracting.    MRI of the leg was done without evidence of osteomyelitis   - Follow cultures  - Initially was treated with IV antibiotics with Ceftriaxone and Vancomycin with pharmacy to dose.  Has since been transitioned to PO Keflex without worsening   - WOC consulted and appreciate their recommendations   - Surgery consulted and appreciate their recommendations     HTN   Blood pressures have been elevated while here and it is unclear if the patient has been taking her blood pressure medications  - PTA Norvasc and Diovan  - PRN Hydralazine     Dementia  Delirium   At her baseline who was present at bed side.  Currently resides in [facility]  Continues to have issues with delirium and agitation in the morning   - Continue to monitor  - Delirium precautions in place  - Continue Zyprexa 5 mg ODT at bedtime   - Re-consulted psychiatry to help with agitation    CKD stage III   H/o MPGN   Baseline Cr appears to be 1 and was 1.13 on presentation   - Will monitor as needed     Chronic anemia  Hgb 8.2 on admission and baseline appears to be 7.5-9.  On recheck was 7.6.   - Monitor as needed       Diet: Regular Diet Adult  Room Service    DVT Prophylaxis: Pneumatic Compression Devices  Cassidy Catheter: not present  Code Status: Full Code      Disposition Plan   Expected discharge: 1-2 days, once delirium improved.  May need TCU if unable to do  dressing changes at   Entered: Foreign Mclean DO 10/28/2019, 10:57 AM       The patient's care was discussed with the Bedside Nurse, Care Coordinator/ and Patient.    Foreign Mclean DO  Hospitalist Service  [facility]    ______________________________________________________________________    Interval History   Patient seen and examined.  Became agitated this AM again and was trying to hit staff.  On my evaluation no complaints of pain or difficulty breathing.  No fevers or chills.     Data reviewed today: I reviewed all medications, new labs and imaging results over the last 24 hours. I personally reviewed no images or EKG's today.    Physical Exam   Vital Signs: Temp: 97.9  F (36.6  C) Temp src: Oral BP: (!) 170/90 Pulse: 64   Resp: 16 SpO2: 92 % O2 Device: None (Room air)    Weight: 103 lbs 0 oz  General Appearance: Resting comfortably in wheelchair.  NAD   Respiratory: Clear to auscultation.  No respiratory distress  Cardiovascular: RRR.  No obvious murmurs  GI: Bowel sounds present.  Non-tender  Skin: Bandage in place to right shin.  No rashes.  No cyanosis  Other: No edema.  No calf tenderness      Data   Recent Labs   Lab 10/26/19  0618 10/25/19  1253   WBC 12.3* 7.9   HGB 7.6* 8.2*    104*    392   NA  --  141   POTASSIUM  --  4.5   CHLORIDE  --  109   CO2  --  26   BUN  --  40*   CR 1.18* 1.13*   ANIONGAP  --  6   EBONY  --  8.0*   GLC  --  95     No results found for this or any previous visit (from the past 24 hour(s)).

## 2019-10-28 NOTE — PROGRESS NOTES
"  [facility] Nurse Inpatient Wound Assessment   Initial Assessment of wound(s) on pt's:   Right shin    WOC NURSE ASSESSMENT    Slightly infected, irritated and painful wound on pt's right shin due to a fall/ bump about 3 weeks ago.  Will do daily wound care, packing with Iodosorb Gel, see plan below  WO NURSE TREATMENT PLAN    Plan of care for wound located right shin, daily  - clean bilateral feet / legs with soap and water, rinse, dry  - apply lotion from toes to knees, not between toes.  -explore the wound with a q tip first- you will find undermining circumferentially in the wound  1. Clean wound with saline or MicKlenz Spray- NOTE- you must press the nozzle of the MicroKlenz Spray or 10cc saline syringes to the wound and blast out the wound, cleaning out all of the pocketing you just explored with the q tip, mop dry  2. Paint with No Sting Skin Prep (#[id number])) and allow to dry thoroughly  3. Cut a foot long length of NuGauze Ribbon and run the ribbon through a glob of Iodosorb gel in the palm of your had.  Tuck this ribbon into the pocketing in your wound bed.   4. Press a Mepilex  Border Dressing (#[id number]) to the area, making sure to conform nicely to skin curvatures   (begin placing the Mepilex at the most distal aspect first, smooth into place in an upward direction, then smooth side to side)   5. Time and date dressing change and magalys with a \"T\" for treatment of a wound  6. Reposition pt every 1 to 2 hours when in bed and hourly when up to the chair to relieve pressure and promote perfusion to tissue  NOTE:  Iodosorb Gel should not be used longer than 14 days. After 14 days the gel should be stopped and a new plan established, this may mean only a simple, gauze dressing.  The Iodosorb Gel should be stopped after use on November 10, 2019  NOTE KEEP HEELS ELEVATED AT ALL TIMES, AT LEAST ONE PILLOW UNDER EACH CALF, ASSURING HEELS ELEVATED  REPOSITION PT SIDE TO SIDE, Q 2HOURS WHEN IN BED.     Daily to every " other day shower while in the hospital    Nursing to notify Provider(s) and re-consult the WOC Nurse if wound(s) deteriorate or new skin concerns    WOC Nurse follow-up plan: weekly  OBJECTIVE DATA    Patient history according to provider notes:  year old female with a history of dementia, multiple myeloma, MPGN, HTN and CKD stage III who presented to the ED on 10/25/2019 with a right shin wound after a mechanical fall      Right shin wound  Approximately 3 weeks ago the patient fell out of her wheel chair and scraped her right shin. On exam there is visible bleeding in the wound.  A x-ray of the tib/fib showed soft tissue gas but suspect this is more likely due to fistula tracting.    MRI of the leg was done without evidence of osteomyelitis   - Follow cultures  - Initially was treated with IV antibiotics with Ceftriaxone and Vancomycin with pharmacy to dose.  Will transition to PO Keflex given no evidence of osteomyelitis and no known history of MRSA     Chirag Risk Assessment  Sensory Perception: 4-->no impairment    Moisture: 3-->occasionally moist   Activity: 2-->chairfast     Mobility: 2-->very limited   Nutrition: 3-->adequate   Friction and Shear: 2-->potential problem      Positioning: Pillows,     Mattress:  Standard , Atmos Air mattress    Current diet  Orders Placed This Encounter      Regular Diet Adult        Moisture Management:  Diaper  o Catheter secured? Not applicable     o I/O last 3 completed shifts:  o In: 50 [P.O.:50]  o Out: -     Labs:   Recent Labs   Lab Test 10/26/19  0618 10/25/19  1253  05/20/19  1715  02/25/16  0652   ALBUMIN  --   --   --  3.2*   < >  --    HGB 7.6* 8.2*   < > 8.6*   < > 10.6*   INR  --   --   --   --   --  0.95   WBC 12.3* 7.9   < > 9.0   < > 6.4   CRP  --  59.4*  --   --   --   --     < > = values in this interval not displayed.         WOC NURSE PHYSICAL EXAM    Wound Assessment (location):   Right shin  Wound History:  Pt bumped shin about 3 weeks ago  Date of last  photo 10/28/2019      Wound opening at skin level is a2.7cm x 2cm x 1cm+, undermining noted in all directions with 12 oclock up to about 2cm, 3oclock= 1cm , 6oclock = 3cm and 9oclock = aobut 1cm.   No bone palpable, visible wound bed is moist red tissue, tender to the touch.  Erythema noted and slightly warm to the touch.  Drainage unremarkable serosanguinous drainage.     General hygiene hair is greasy and skin flaking and messy looking, pt needs a couple of good showers.     WOC NURSE INTERVENTIONS    Assessed bilateral LE as noted above  Wound Care: done per plan of care  Supplies: reviewed, gathered, placed at the bedside, discussed with RN and discussed with patient  Discussed plan of care with Patient and Nurse  Education provided: importance of repositioning, plan of care, wound progress and Infection prevention     Marco Antonio Hansen RN

## 2019-10-28 NOTE — PLAN OF CARE
VSS, incontinent to bladder- purewick in place, tolerating reg diet. Up in chair today for couple hours with heavy assist of 2 with transfer and mobility. Denies pain. Dressing on RLE- CDI. Reposition q2hrs but pt refuses at times. Needs a lot of encouragement with oral fluid. Continue to monitor.

## 2019-10-28 NOTE — PLAN OF CARE
Pt A&Ox3, forgetful. CMS intact. VSS. Turned q2hrs, moves well in bed. Denies pain. Voiding adequately, incontinent. Continue to monitor.      This morning patient was disoriented and very confused. Refusing to take medication or be repositioned. Pt kicking and pinching. Pt left alone to this after this incident.

## 2019-10-28 NOTE — PROGRESS NOTES
"   10/28/19 1000   Strength   Strength Comments Decreased strength all 4 extremities, grossly 4-/5   Bed Mobility   Bed Mobility Comments min assist 2   Transfer Skills   Transfer Comments sit to stand with mod assist 2   Gait   Gait Comments unable - hasn't walked for years   Balance   Balance Comments steady on EOB, supervision   Sensory Examination   Sensory Perception no deficits were identified   Coordination   Coordination no deficits were identified   Muscle Tone   Muscle Tone no deficits were identified   General Therapy Interventions   Planned Therapy Interventions strengthening;transfer training   Clinical Impression   Criteria for Skilled Therapeutic Intervention yes, treatment indicated   PT Diagnosis impaired functional mobility   Influenced by the following impairments generalized deconditioning, weakness   Functional limitations due to impairments increased caregiver burden, unable to transfer with 1 assist currently   Clinical Presentation Stable/Uncomplicated   Clinical Presentation Rationale per clinical judgment   Clinical Decision Making (Complexity) Low complexity   Therapy Frequency 5x/week   Predicted Duration of Therapy Intervention (days/wks) 3 days   Anticipated Discharge Disposition   (return to [facility])   Risk & Benefits of therapy have been explained Yes   Patient, Family & other staff in agreement with plan of care Yes   [facility] True Office TM \"6 Clicks\"   2016, Trustees of [facility], under license to [employer].  All rights reserved.   6 Clicks Short Forms Basic Mobility Inpatient Short Form   [facility] AM-PAC  \"6 Clicks\" V.2 Basic Mobility Inpatient Short Form   1. Turning from your back to your side while in a flat bed without using bedrails? 3 - A Little   2. Moving from lying on your back to sitting on the side of a flat bed without using bedrails? 3 - A Little   3. Moving to and from a bed to a chair (including a wheelchair)? 2 - A Lot   4. Standing up from a " chair using your arms (e.g., wheelchair, or bedside chair)? 2 - A Lot   5. To walk in hospital room? 1 - Total   6. Climbing 3-5 steps with a railing? 1 - Total   Basic Mobility Raw Score (Score out of 24.Lower scores equate to lower levels of function) 12   Total Evaluation Time   Total Evaluation Time (Minutes) 15

## 2019-10-28 NOTE — PLAN OF CARE
Discharge Planner OT   Patient plan for discharge: none in chart  Current status: orders received, chart reviewed and discussed patient with son. Son states that pt lives at [facility], she is wheelchair bound, assist of one to pivot transfer, but sounds like it is max A and pt is small enough that she is easily lifted to from bed to w/c, toilet etc. All ADL's and IADL's are provided for her. He states that it has been very difficult the past 5 years and him and his brother are burnt out, and that recently her dementia seems to have advanced. He states he has a hard time watching his mother yell and mistreat the staff so he will not be staying.   Barriers to return to prior living situation: none anticipated, will defer to medical team  Recommendations for discharge: will defer transfers to PT, no OT needs at this time as pt is dependent for all ADL's. Anticipate as long as she is able to transfer she will be able to return to [facility]  Rationale for recommendations: will complete OT orders       Entered by: Rosita Torrez 10/28/2019 9:06 AM

## 2019-10-28 NOTE — CONSULTS
"Consult Date:  10/27/2019      PSYCHIATRIC CONSULTATION      DATE OF SERVICE:  10/27/2019.      REASON FOR CONSULTATION:  Delirium, increasing agitation.      REQUESTING PHYSICIAN:  Foreign Mclean DO.      PRIMARY CARE PHYSICIAN:  Man Stovall MD.      IDENTIFYING DATA:  Kalli Brown is a 89-year-old woman who presented to [facility] ED on 10/25/2019 for a wound check.  She is a poor historian on account of her established history of dementia.  Psychiatry was consulted to render an opinion on her dementia with agitation.  Information was gathered through direct patient contact, chart review. as well as collateral information provided by her nurse.      CHIEF COMPLAINT:  \"I'm doing better now and they tell me I may be going home soon.\"      HISTORY OF PRESENT ILLNESS:  Kalli German reportedly presented with a right shin wound following a mechanical fall, which occurred about 3 weeks prior to her presentation.  She apparently fell out of her wheelchair and scraped her leg.  She was initially seen in the hospital and discharged.  Records suggest that, since then, the patient's facility has been monitoring a hematoma on her right shin and they noticed the development of a possible ulcer from the hematoma.  On the day prior to presentation, they noticed bleeding from the wound and the patient kept soaking through bandages.  Records suggest that her son, who is an ED physician, brought her in on 10/25/2019, as there was concern for a possible infection because the area had become more tender and erythematous.  Otherwise, she did not report any other physical concerns.  In the ED, she was assessed as having osteomyelitis and skin ulcer, on account of which she was admitted to inpatient for further care.  In the course of her stay, she reportedly developed delirium on top of her baseline dementia requiring p.r.n. Zyprexa.  The Treatment Team put in delirium precautions and Psychiatry was " consulted to evaluate her.  At bedside, the nurse reports that she has been doing much better and has been taking medications as prescribed.  She has not been a problem to manage.  She did mention that she is incontinent of urine and requires turning and repositioning on a regular basis.  She does have waxing and waning cognition and is said to be alert and oriented at times.  On direct interview, the patient was extremely difficult to assess on account of her severe hearing difficulty.  She denied the presence of auditory or visual hallucinations, and was able to describe her situation and correctly identified that she is in the hospital for treatment of her wound.  She knew we were in 10/2019, but she gave the date as the 31st  instead of the 27th.  She denies neurovegetative symptoms of depression, but it was difficult to ascertain any history of krystle or anxiety.  At the time of this assessment, she was very calm and cooperative, while records suggest that she has had issues with agitation, particularly in the mornings.      PAST PSYCHIATRIC HISTORY:  The patient reportedly does not have a history of mental illness, but she had been placed on Remeron and Zoloft in the past to manage insomnia and dysphoria.      CHEMICAL USE HISTORY:  None reported.      PAST MEDICAL HISTORY:   1.  Anemia.   2.  Deafness.   3.  Bilateral chronic shoulder dislocation.   4.  Edema.   5.  Glaucoma.   6.  Hypertension.   7.  Multiple myeloma.   8.  Proteinuria.   9.  Trigeminal neuralgia.      PAST SURGICAL HISTORY:   1.  Eye surgery.   2.  GYN surgery.   3.  Orthopedic surgery.   4.  Glaucoma surgery.      MEDICATIONS PRIOR TO ADMISSION:   1.  Tylenol.   2.  Norvasc.   3.  Aspirin.   4.  Citracal.   5.  Tegretol.   6.  Colace.   7.  FeroSul.   8.  Lasix.   9.  Xalatan.   10.  Halls cough drops.   11.  Remeron.   12.  MiraLax.   13.  Zoloft.   14.  Timoptic.   15.  Ultram.   16.  Diovan.   17.  Vitamin D.      ALLERGIES:   "ATORVASTATIN.      FAMILY PSYCHIATRIC HISTORY:  None reported.      SOCIAL HISTORY:  The patient is unable to give any historical data, but records suggest that she was  in 2014.  She was an  and worked in the [employer] [facility] area.  She reportedly has 2 sons, one of whom is an ED physician who brought her in.      REVIEW OF SYSTEMS:  This could not be obtained due to the patient's baseline dementia.      VITAL SIGNS:  Blood pressure 135/88, pulse 62, respirations 16, temperature 98.5.  Weight 46.7 kg.      MENTAL STATUS EXAMINATION:  This is an elderly woman who appears her stated age.  She is seen at her bedside, where she is lying on the bed clothes.  She makes good eye contact, but is extremely hard of hearing.  She does make an effort to engage with the undersigned, and her mood is described as \"good\" and her affect is mood congruent.  Her thought process is difficult to assess, but she denies the presence of psychosis.  She does not endorse self-harm thoughts or plans.  She is future oriented.  Her muscle strength is weak.  Her gait and station are not assessed as she is currently bedbound.  Her associations are concrete.  Her language is appropriate.  Her attention and concentration are poor.  Her impulse control is marginal.  Her recall of recent events is limited, and remote recall is fair.  She displays poor insight and judgment.  She is alert and oriented to person and place, but not to time.      RISK ASSESSMENT:  At this time, is considered moderate.      DIAGNOSTIC IMPRESSION:  Kalli Brown is a 89-year-old  mother of 2 with history of dementia, who presented to the hospital for a wound check, but became delirious on account of which she has been receiving olanzapine p.r.n.  Psychiatry was consulted to render an opinion on her current presentation.  At the time of my assessment, the patient was calm and staff report that she has been appropriate all day, requiring no emergency " interventions.      DIAGNOSES:   1.  Delirium, probably due to multiple factors.   2.  Unspecified neurocognitive disorder.   3.  Unspecified anxiety disorder.   4.  Right shin wound.   5.  Hypertension.   6.  Chronic kidney disease, stage III.   7.  History of multiple myeloma.   8.  Trigeminal neuralgia.      RECOMMENDATIONS:   1.  Medical management as you are.   2.  The patient appears to be coming out of her delirious state, based on her presentation at time of my assessment plus reports provided by her nurse  She reportedly is responding to prescribed olanzapine at 5 mg at bedtime and I recommend that this be maintained.  If she continues to present as agitated, I will suggest reducing her antidepressants burden by reducing her sertraline dose to 50 mg.  Psychiatry may be reconsulted as needed.      Thanks for the consult.         TRACY RAMOS MD             D: 10/27/2019   T: 10/27/2019   MT: TANO      Name:     ROSENDO JEFF   MRN:      [id number]        Account:       [id number]   :      1929           Consult Date:  10/27/2019      Document: [id number]       cc: Man Mclean DO

## 2019-10-29 NOTE — PLAN OF CARE
Vital signs stable on 2L NC ex HTN and tachycardia. A&Ox self; knows she's in a hospital. Lung sounds diminished. Bowel sounds active. + flatus. Regular diet. Denies nausea. Adequate urinary output via purewick. CMS intact. Mepilex to coccyx. Gen bruising to skin. Dressing to RLE changed x1 per WOC orders. Up with heavy A2, GB and walker. Denies pain. Takes pills whole w water. Slept well throughout the night. Plan to discharge today or tomorrow.

## 2019-10-29 NOTE — PROGRESS NOTES
A&Ox2. Pleasant. A-2 with cares. Up in chair for lunch. Denies pain. Eating and drinking well. Voiding. Dressing on right shin changed per order. Tolerated well. No fever. BP elevated. Unchanged from baseline. HR and SPO2 WNL.

## 2019-10-29 NOTE — PROGRESS NOTES
BRISEIDA  D: Attempted to call pt's son, Chavo to discuss discharge plan but VM was full so couldn't leave a message. BRISEIDA will try Chavo again later.   P: Will continue to follow for discharge planning    Sharon KAPLAN, [facility]     ADDENDUM @[phone]: Faxed clinical information to [facility] to start insurance auth.

## 2019-10-29 NOTE — PROGRESS NOTES
[facility]    Medicine Progress Note - Hospitalist Service       Date of Admission:  10/25/2019  Assessment & Plan   Kalli Brown is a 89 year old female with a history of dementia, multiple myeloma, MPGN, HTN and CKD stage III who presented to the ED on 10/25/2019 with a right shin wound after a mechanical fall     Right shin wound  Approximately 3 weeks ago the patient fell out of her wheel chair and scraped her right shin. On exam there is visible bleeding in the wound.  A x-ray of the tib/fib showed soft tissue gas but suspect this is more likely due to fistula tracting.    MRI of the leg was done without evidence of osteomyelitis   - Follow cultures  - Initially was treated with IV antibiotics with Ceftriaxone and Vancomycin with pharmacy to dose.  Has since been transitioned to PO Keflex without worsening   - WOC consulted and appreciate their recommendations   - Surgery consulted and appreciate their recommendations.  No plans for surgical intervention     HTN   Blood pressures have been elevated while here and it is unclear if the patient has been taking her blood pressure medications  - PTA Norvasc and Diovan  - PRN Hydralazine     Dementia  Delirium   At her baseline who was present at bed side.  Currently resides in [facility]  Agitation is better today with the at bedtime zyprexa   - Continue to monitor  - Delirium precautions in place  - Continue Zyprexa 5 mg ODT at bedtime     CKD stage III   H/o MPGN   Baseline Cr appears to be 1 and was 1.13 on presentation   - Will monitor as needed     Chronic anemia  Hgb 8.2 on admission and baseline appears to be 7.5-9.  On recheck was 7.6.   - Monitor as needed       Diet: Regular Diet Adult  Room Service    DVT Prophylaxis: Pneumatic Compression Devices  Cassidy Catheter: not present  Code Status: Full Code      Disposition Plan   Expected discharge: Tomorrow, recommended to transitional care unit once safe disposition plan/ TCU bed  available.  Entered: Foreign Mclean DO 10/29/2019, 10:37 AM       The patient's care was discussed with the Care Coordinator/ and Patient.  Attempted to call sons as well but was unsuccessful     Foreign Mclean DO  Hospitalist Service  [facility]    ______________________________________________________________________    Interval History   Patient seen and examined.  No acute events over night.  Patient did not have any agitation or evidence of delirium this AM.  No complaints of pain or difficulty breathing when I saw the patient    Data reviewed today: I reviewed all medications, new labs and imaging results over the last 24 hours. I personally reviewed no images or EKG's today.    Physical Exam   Vital Signs: Temp: 97.8  F (36.6  C) Temp src: Oral BP: (!) 165/80 Pulse: 58 Heart Rate: 63 Resp: 16 SpO2: 94 % O2 Device: Nasal cannula Oxygen Delivery: 2 LPM  Weight: 103 lbs 0 oz  General Appearance: Resting comfortably.  NAD   Respiratory: Clear to auscultation.  No respiratory distress  Cardiovascular: RRR.  No obvious murmurs  GI:  Bowel sounds present.  Non-tender  Skin: Dressing in place to right shin.  No obvious rashes.  No cyanosis  Other: No edema.  No calf tenderness      Data   Recent Labs   Lab 10/29/19  0940 10/26/19  0618 10/25/19  1253   WBC 6.6 12.3* 7.9   HGB 7.5* 7.6* 8.2*   * 100 104*    310 392   NA  --   --  141   POTASSIUM  --   --  4.5   CHLORIDE  --   --  109   CO2  --   --  26   BUN  --   --  40*   CR  --  1.18* 1.13*   ANIONGAP  --   --  6   EBONY  --   --  8.0*   GLC  --   --  95     No results found for this or any previous visit (from the past 24 hour(s)).

## 2019-10-29 NOTE — PROGRESS NOTES
Care Coordination:    Contacted nursing at [facility] ([phone])    They cannot accommodate daily dressing changes as there is not nursing in the building daily.     MD and SW updated, to pursue TCU.     Cintia Fernández RN, BSN  [facility] Care Coordinator   Mobile Phone: [phone]

## 2019-10-29 NOTE — CONSULTS
Care Transition Initial Assessment -      Met with: Patient, son-Chavo     Active Problems:    Open wound of right lower leg       DATA  Lives With: alone, facility resident   Living Arrangements: assisted living  Quality of Family Relationships: supportive, involved  Description of Support System: Supportive, Involved  Who is your support system?: Children  Identified issues/concerns regarding health management: discharge planning  Quality of Family Relationships: supportive, involved  Transportation Anticipated: family or friend will provide    ASSESSMENT  Cognitive Status:  awake and alert  Concerns to be addressed:     Per SW consult for discharge planning. Pt admitted 10/25 with an open wound of right lower leg. Pt will tentatively discharge 10/30. Reviewed medical record. Met with pt and son, Chavo to discuss discharge plan. Pt was previously living at [facility] with some services. Discussed recommendation for TCU due to [facility] being unable to do daily wound care dressing changes. Chavo was surprised by recommendation but mostly in agreement with it. Discussed memory care TCU's. Chavo would like referral put out to [facility]. SW asked for a back-up option if [facility] was full. Chavo preferred to discuss with his brother before giving additional choices. SW will put out referral via DOD and then follow-up with Chavo after he discusses with his brother.      PLAN  Financial costs for the patient includes: n/a   Patient given options and choices for discharge: reviewed memory care TCU choices   Patient/family is agreeable to the plan?  Yes  Transportation/person available: TBD   Patient Goals and Preferences: TCU  Patient anticipates discharging to: TCU    Will continue to follow for discharge planning    Sharon KAPLAN, [facility]  [phone]

## 2019-10-29 NOTE — PLAN OF CARE
Discharge Planner PT   Patient plan for discharge: back to [facility]  Current status: Pt supine in bed at arrival. Pt transferred supine to sit on EOB with Zack to elevate trunk. Pt sat EOB with Zack to keep midline and cues to scoot forward. Pt is slightly impulsive and needs cues to stay sitting until gait belt placed. Pt transferred sit to/from stand with maxA x2 to FWW. Attempted to have pt take 2 steps to chair, pt unable to stay up and needed maxA x2 to sit back down on the bed. Pt attempted to transfer again sit to stand with HHA of therapist and nurse. Pt took 2 steps to chair with HHA x2 and unable to follow cues for hand placement for safety. Pt needed maxA to boost up in chair. Pt left sitting in chair with nursing staff present.   Barriers to return to prior living situation: currently requires 1-2 assist for mobility  Recommendations for discharge: return to long term if they can provide level of assist needed, with home PT per plan established by the PT.  Rationale for recommendations: Pt is a little below baseline for mobility, if long term can provide 2 assist with transfers initially, anticipate she would soon be back to her baseline of 1 assist for transfers. If long term cannot provide this level of assist, would recommend TCU.       Entered by: Katy White 10/29/2019 11:42 AM

## 2019-10-30 NOTE — PROGRESS NOTES
Brief Palliative Care Team Note.    Consult received, chart reviewed and discussed with Dr Mclean. Met briefly with Ms Brown and her two sons to schedule time to meet (waiting on psych consult to determine decisional capacity). Plan to meet tomorrow (10/31) at 9AM. Please do not hesitate to contact our team if more urgent needs arise. Thank you.    Sameera HOYT, CNP  Palliative Medicine  [phone]

## 2019-10-30 NOTE — PLAN OF CARE
Discharge Planner OT   Patient plan for discharge: return to prior level of function   Current status: Orders received, eval complete. Pt is a 89 y/o female admitted with worsening R shin wound (sustained originally with fall out of w/c at [facility] 3 weeks ago). Pt admitted Called [facility] and they stated patient receives assistance with all ADLs and IADLs at baseline. At baseline patient is wheelchair bound. OT ordered to administer SLUMs. Patient scored a 25/30 indicating mild impairments in areas of short term memory and recall. Patient has documented dementia. Patient at baseline with ADLs not further OT needs. Defer to PT.   Barriers to return to prior living situation: cognitive impairments, wound management, safety awareness   Recommendations for discharge: Defer to PT   Rationale for recommendations: Defer to PT. OT orders complete        Entered by: Babs Villa 10/30/2019 2:10 PM

## 2019-10-30 NOTE — PROGRESS NOTES
10/30/19 1400   Quick Adds   Type of Visit Initial Occupational Therapy Evaluation   Living Environment   Lives With alone;facility resident   Living Arrangements assisted living   Functional Level   Ambulation 4-->completely dependent   Transferring 4-->completely dependent   Toileting 3-->assistive equipment and person   Bathing 3-->assistive equipment and person   Dressing 2-->assistive person   General Information   Onset of Illness/Injury or Date of Surgery - Date 10/25/19   Referring Physician Foreign Mclean   Patient/Family Goals Statement return home    Additional Occupational Profile Info/Pertinent History of Current Problem  Pt is a 89 y/o female admitted with worsening R shin wound (sustained originally with fall out of w/c at [facility] 3 weeks ago).    Precautions/Limitations fall precautions  (hard of hearing )   Cognitive Status Examination   Orientation orientation to person, place and time   Level of Consciousness alert   Follows Commands (Cognition) follows one step commands;over 90% accuracy  (hearing required therapist to repeat self )   Memory impaired   Cognitive Comment Patient completed SLUMs scoring a 25/30 indicating patient scored in the mild impairments. Deficits noted in short term memory and recall.    Visual Perception   Visual Perception No deficits were identified   Pain Assessment   Patient Currently in Pain No   Transfer Skills   Transfer Comments per chart pt. currently requires A of 2 for all transfers. Patient wheelchair bound at baseline. Patient receives Ax1 for all ADLs i.e dressing toileting and bathing and all IADLs    Instrumental Activities of Daily Living (IADL)   IADL Comments Patient recieves assistance with all IADLs at baseline.   General Therapy Interventions   Planned Therapy Interventions cognition   Clinical Impression   Criteria for Skilled Therapeutic Interventions Met evaluation only   Influenced by the following impairments baseline cognitive impairments  "   Assessment of Occupational Performance 1-3 Performance Deficits   Clinical Decision Making (Complexity) Low complexity   Anticipated Discharge Disposition Home  (with increased assist )   Risks and Benefits of Treatment have been explained. Yes   Patient, Family & other staff in agreement with plan of care Yes   [facility] AM-PAC  \"6 Clicks\" Daily Activity Inpatient Short Form   1. Putting on and taking off regular lower body clothing? 2 - A Lot   2. Bathing (including washing, rinsing, drying)? 2 - A Lot   3. Toileting, which includes using toilet, bedpan or urinal? 2 - A Lot   4. Putting on and taking off regular upper body clothing? 2 - A Lot   5. Taking care of personal grooming such as brushing teeth? 2 - A Lot   6. Eating meals? 3 - A Little   Daily Activity Raw Score (Score out of 24.Lower scores equate to lower levels of function) 13   Total Evaluation Time   Total Evaluation Time (Minutes) 20     "

## 2019-10-30 NOTE — PROGRESS NOTES
BRISEIDA  D: Received VM from [facility]-they have denied insurance auth for pt to go to TCU due to documentation that pt is w/c bound at baseline. BRISEIDA called [facility] to discuss further-they are requesting additional clinical information to review. BRISEIDA called and left  for [facility] to see if they can accommodate pt's current transfer needs. BRISEIDA will fax over additional information to [facility] for their review.   P: Will continue to follow for discharge planning    Sharon KAPLAN, [facility]        ADDENDUM @1024: Met with son's and MD to give update on discharge plan. BRISEIDA explained insurance auth denial and needing to submit additional information. BRISEIDA also explained [facility] has accepted pt for admission pending [employer] auth. MD is consulting additional providers so BRISEIDA will wait to fax information until those providers see pt.

## 2019-10-30 NOTE — PROGRESS NOTES
[facility]    Medicine Progress Note - Hospitalist Service       Date of Admission:  10/25/2019  Assessment & Plan   Kalli Brown is a 89 year old female with a history of dementia, multiple myeloma, MPGN, HTN and CKD stage III who presented to the ED on 10/25/2019 with a right shin wound after a mechanical fall     Right shin wound  Approximately 3 weeks ago the patient fell out of her wheel chair and scraped her right shin. On exam there is visible bleeding in the wound.  A x-ray of the tib/fib showed soft tissue gas but suspect this is more likely due to fistula tracting.    MRI of the leg was done without evidence of osteomyelitis   - Follow cultures  - Initially was treated with IV antibiotics with Ceftriaxone and Vancomycin with pharmacy to dose.  Has since been transitioned to PO Keflex without worsening   - WOC consulted and appreciate their recommendations   - Surgery consulted and appreciate their recommendations.  No plans for surgical intervention     HTN   Blood pressures have been elevated while here and it is unclear if the patient has been taking her blood pressure medications  - PTA Norvasc and Diovan  - PRN Hydralazine     Dementia  Delirium   At her baseline who was present at bed side.  Currently resides in [facility]  Agitation is better today with the at bedtime zyprexa   - Continue to monitor  - Delirium precautions in place  - Continue Zyprexa 5 mg ODT at bedtime   - Reconsulted psychiatry to help with capacity/competency determination  - Palliative care consulted per family request  - OT consulted and appreciate their evaluation    CKD stage III   H/o MPGN   Baseline Cr appears to be 1 and was 1.13 on presentation   - Will monitor as needed     Chronic anemia  Hgb 8.2 on admission and baseline appears to be 7.5-9.  On recheck was 7.5  - Monitor as needed       Diet: Regular Diet Adult  Room Service    DVT Prophylaxis: Pneumatic Compression Devices  Cassidy Catheter: not  present  Code Status: Full Code      Disposition Plan   Expected discharge: 1-2 days.  At this time I feel the patient would benefit from TCU for dressing changes as they are unable to provide at her current facility on the weekends.  May ultimately need escalation in care  Entered: Foreign Mclean DO 10/30/2019, 11:39 AM       The patient's care was discussed with the Bedside Nurse, Care Coordinator/, Patient and Patient's Family.    Foreign Mclean DO  Hospitalist Service  [facility]    ______________________________________________________________________    Interval History   Patient seen and examined.  No acute events over night.  No fevers or chills.  No chest pain or SOB.  No leg pain.     Data reviewed today: I reviewed all medications, new labs and imaging results over the last 24 hours. I personally reviewed no images or EKG's today.    Physical Exam   Vital Signs: Temp: 97.4  F (36.3  C) Temp src: Oral BP: (!) 164/83   Heart Rate: 74 Resp: 16 SpO2: 94 % O2 Device: None (Room air) Oxygen Delivery: 1 LPM  Weight: 103 lbs 0 oz  General Appearance: Resting comfortably.  NAD  Respiratory: Clear to auscultation. No respiratory distress   Cardiovascular: RRR.  No obvious murmurs  GI: Bowel sounds present.  Non-tender  Skin: Right shin bandage in place.  No obvious rashes  Other: No edema.  No calf tenderness      Data   Recent Labs   Lab 10/29/19  0940 10/26/19  0618 10/25/19  1253   WBC 6.6 12.3* 7.9   HGB 7.5* 7.6* 8.2*   * 100 104*    310 392   NA  --   --  141   POTASSIUM  --   --  4.5   CHLORIDE  --   --  109   CO2  --   --  26   BUN  --   --  40*   CR  --  1.18* 1.13*   ANIONGAP  --   --  6   EBONY  --   --  8.0*   GLC  --   --  95     No results found for this or any previous visit (from the past 24 hour(s)).

## 2019-10-30 NOTE — PLAN OF CARE
Patient has been A/O x4 all shift. VSS, weaned to RA from 2L. Denies pain. Strong Ax2-3. Consider lift assist. Patient very unstable and shaky when up. Incont of bladder, pure wick in place. Had continent BM this shift. Dressing change to coccyx, mepilex for prevention. Blanchable redness. Dressing change to RLE done, CDI. Regular diet, tolerates well. Plans for psych consult to determine patients decision making capability, and palliative consult at 0900 tomorrow AM 10/31. Discharge plans pending.

## 2019-10-30 NOTE — PLAN OF CARE
[id number]: Patient alert but confused at times, pleasant this shift. VSS on 2 L O2 except elevated BP. Patient was independently turning in bed, assistance provided when needed. Incontinent of urine, purwik in place, adequate output. No BM this shift, BS active. Denies pain. Dressing on R shin CDI, CMS intact. PIV SL. Discharge pending TCU placement.

## 2019-10-30 NOTE — PROGRESS NOTES
SPIRITUAL HEALTH SERVICES Progress Note  [facility]    Visit with pt and her two sons, Mic (Chavo?) and Fuad.    Pt was  and positive as she described her recent health challenges and hopes for discharge.    Privately, pt's sons said that despite what she is showing right now, she is at a low point in her life.  They requested prayer, which I shared.    During our visit the palliative nurse practitioner stopped by to introduce herself.  She will facilitate a care conference tomorrow morning, which I will plan to attend as well.    Pt and her family expressed gratitude for my visit and support. I will continue to follow.                                                                                                                                               Dorene Gaxiola M.A.  Staff   Pager [phone]  Phone [phone]

## 2019-10-31 NOTE — CONSULTS
[facility]  Palliative Care Consultation Note    Patient: Kalli Brown  Date of Admission:  10/25/2019    Requesting Clinician / Team: Dr Mclean  Reason for consult: Goals of care    Recommendations:    Per discussion with pt's sons (pt is not decisional) - pt now DNR/DNI and will plan for hospice cares upon discharge.     These recommendations have been discussed with Dr Castro, unit SW, and pt's bedside RN.      Thank you for the opportunity to participate in the care of this patient and family. Our team: will continue to follow.     During regular M-F work hours -- if you are not sure who specifically to contact -- please contact us on our team pager at [phone].    After regular work hours and on weekends/holidays, you can call our answering service at [phone]. Also, who's on call for us is available in [employer].     Sameera HOYT, [facility]  Palliative Medicine   Pager [phone]    Attestation:  Total time on the floor involved in the patient's care: 75 minutes  Total time spent in counseling/care coordination: >50%      Assessments:  Kalli Brown is a 89 year old female with a history of dementia, multiple myeloma, MPGN, HTN and CKD stage III who presented to the ED on 10/25/2019 with a right shin wound after a mechanical fall.     Today, the patient was seen for:  Goals of Care Discussion     Joint visit this morning with Palliative Care  Keila Gaxiola. We met with Ms Brown's two sons privately per their request (still waiting on psych consult to determine decisional capacity of Ms Brown). Pt's sons shared more information about their mother's recent health. It seems she has been residing at [facility] for about 4 years. They have noticed a decline in her overall health in the past months/year. She has been wheelchair bound since moving to [place], but she used to be able to quickly shuffle herself around in her wheelchair. Now she is so weak  "it takes her a great deal of time to get around in her wheelchair. She used to be able to transfer herself and now she is no longer strong enough to do so. Her appetite and intake have declined and they know she is losing weight. The most distressing thing about her current condition is her generalized unhappiness and \"rage.\" More and more she is having episodes of outbursts which can include yelling, hitting, kicking, etc. They tell us she is very angry and will constantly tell her sons they need to move her to her own home to live independently with a caregiver. They do not feel she has insight into her current state of health as they do not feel moving her into an independent home with a caregiver is even an option at this point considering how much care she is requiring. They have had many conversations with her regarding code status and she adamantly states she wants to be full code. They think she is fearful of death and this may be contributing to her refusal to change her code status. Ultimately they feel her quality of life is very poor and agree that she is not likely going to get better than her current state of health and feel a comfort focused plan of care would be the best way to care for her at this time. We discussed hospice cares and possible options for where she can receive these cares. We made a plan to continue this discussion once we hear from the psychiatrist about their mother's decisional capacity.     ADDENDUM: Noted psych consult, pt not decisional. I contacted pt's son Fuad to update him on this information. We discussed plan of care going forward. He agrees with plan to discharge with hospice cares. He also agrees to changing her code status to DNR/DNI. I updated unit SW, pt's bedside RN, and Dr Castro of this discussion.     Prognosis, Goals, & Planning:      Functional Status just prior to hospitalization: 4 (Completely disabled and dependent on others for selfcare; " bedbound)      Prognosis, Goals, and/or Advance Care Planning were addressed today: Yes        Summary/Comments:       Patient's decision making preferences: does not know          Patient has decision-making capacity today for complex decisions: Unclear, It appears she lacks insight into her current state of health, therefore I do not think she is decisional at this time.             I have concerns about the patient/family's health literacy today: No           Patient has a completed Health Care Directive: Yes, and on file.      Code status: full code    Coping, Meaning, & Spirituality:   Mood, coping, and/or meaning in the context of serious illness were addressed today: Yes  Summary/Comments:     Social:     Living situation: resides at [facility]    Ivory family / caregivers: , 2 sons who are involved in her care    Occupational history: retired     History of Present Illness:  History gathered today from: family/loved ones, medical chart, medical team members    Adopted from H&P  Kalli Irelandandrea Kevin is a 89 year old female who presented with a right shin wound after a mechanical fall.  Approximately 3 weeks ago the patient fell out of her wheel chair and scraped her leg.  She was initially seen in the hospital and discharged.  Since then the patient's facility has been monitoring a hematoma to the right shin and they noticed development of a possible ulcer to the hematoma.  Then yesterday they noticed bleeding from the wound and the patient kept soaking through bandages.  The patient was then brought in today by her son who is an ED physician as they were concerned of a possible infection as the area was more tender and erythematous.  No fevers or chills noted.  On my evaluation the patient denies any pain.  No chest pain, SOB.  No recent N/V/D noted.     Key Palliative Symptom Data:  We are not helping to manage these symptoms currently in this patient.    Patient is not on  opioids.    ROS:  Comprehensive ROS is reviewed and is negative except as per HPI.     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      Allergies:  Allergies   Allergen Reactions     Atorvastatin         Medications:  I have reviewed this patient's medication profile and medications from this hospitalization.   Current Facility-Administered Medications Ordered in Epic   Medication Dose Route Frequency Last Rate Last Dose     amLODIPine (NORVASC) tablet 10 mg  10 mg Oral Daily   10 mg at 10/30/19 0821     carBAMazepine (TEGretol) half-tab 300 mg  300 mg Oral QPM   300 mg at 10/30/19 1955     carBAMazepine (TEGretol) tablet 200 mg  200 mg Oral QAM   200 mg at 10/30/19 0821     cephALEXin (KEFLEX) capsule 250 mg  250 mg Oral Q8H   250 mg at 10/31/19 0024     ferrous sulfate (FEROSUL) tablet 325 mg  325 mg Oral BID w/meals   325 mg at 10/30/19 1824     hydrALAZINE (APRESOLINE) injection 10 mg  10 mg Intravenous Q4H PRN   10 mg at 10/25/19 1744     valsartan (DIOVAN) tablet 320 mg  320 mg Oral Daily   320 mg at 10/30/19 0813    And     hydrochlorothiazide half-tab 12.5 mg  12.5 mg Oral Daily   12.5 mg at 10/30/19 0821     hypromellose-dextran (ARTIFICAL TEARS) 0.1-0.3 % ophthalmic solution 2 drop  2 drop Ophthalmic Q1H PRN    2 drop at 10/30/19 1955     lidocaine (LMX4) cream   Topical Q1H PRN         lidocaine 1 % 0.1-1 mL  0.1-1 mL Other Q1H PRN         melatonin tablet 1 mg  1 mg Oral At Bedtime PRN   1 mg at 10/25/19 2048     mirtazapine (REMERON) tablet 15 mg  15 mg Oral At Bedtime   15 mg at 10/30/19 1955     naloxone (NARCAN) injection 0.1-0.4 mg  0.1-0.4 mg Intravenous Q2 Min PRN         OLANZapine (zyPREXA) injection 2.5 mg  2.5 mg Intramuscular Q6H PRN   2.5 mg at 10/26/19 0642     OLANZapine zydis (zyPREXA) ODT tab 5 mg  5 mg Oral At Bedtime   5 mg at 10/30/19 1955     sertraline (ZOLOFT) tablet 100 mg  100 mg Oral Daily   100 mg at 10/30/19 0813     sodium chloride (PF) 0.9% PF flush 3 mL  3 mL Intracatheter q1 min prn         sodium chloride (PF) 0.9% PF flush 3 mL  3 mL Intracatheter Q8H   3 mL at 10/31/19 0029     No current [facility]-ordered outpatient medications on file.       Physical Exam:  Vital Signs: Temp: 97.1  F (36.2  C) Temp src: Oral BP: (!) 179/80 Pulse: 67 Heart Rate: 60 Resp: 16 SpO2: 93 % O2 Device: Nasal cannula Oxygen Delivery: 2 LPM  Vitals:    10/25/19 1020   Weight: 46.7 kg (103 lb)     CONSTITUTIONAL: NAD, A&O to self. Calm and cooperative.  HEENT: NCAT, MMM  NECK: Supple  CARDIOVASCULAR: exam deferred   RESPIRATORY: NL respiratory effort on RA  GASTROINTESTINAL: exam deferred   MUSCULOSKELETAL:  Moving freely in chair.  SKIN: Warm and intact. No concerning lesions or rashes on exposed skin surfaces   NEUROLOGIC: Appropriately responsive during interview  PSYCH: Affect congruent     Data reviewed:  Recent imaging reviewed, my comments on pertinents:   MRI 10/26  IMPRESSION:  1. Subcutaneous thickening and small amount of nonspecific fluid in  the anterior lateral mid right lower leg consistent with known  posttraumatic subcutaneous hematoma. An infected hematoma cannot be  excluded.  2. No evidence for osteomyelitis in the adjacent bones.  3. Right popliteal cyst.      Recent lab data reviewed, my  comments on pertinents:   No results found for this or any previous visit (from the past 24 hour(s)).

## 2019-10-31 NOTE — PLAN OF CARE
A&O, answers questions appropriately, forgetful. HTN noted, within parameters. AOVSS on 2L NC. LS dim. T/R q2. Purewick changed and in place. UOP adequate, patrick. Palliative, psych consults planned today.

## 2019-10-31 NOTE — PROGRESS NOTES
"SPIRITUAL HEALTH SERVICES Progress Note  [facility]    Participated in care conference with pt's two sons (Chavo and Fuad), along with Joy Beauchamp, palliative NP.  Followed this with a visit with pt and sons together.    Pt's sons described pt's many physical and emotional challenges over the past five years, with particular decline in the last few months.  They describe pt's overall unhappiness, many difficulties and rather poor quality of life.  They are awaiting further word from specialists, but are leaning towards comfort care (if they are able to make decisions on pt's behalf).    Pt is [employer] but hasn't been a Islam goer in forty years.  They believe that she is afraid to die, but is not able to talk about this with them.    In my visit with pt, she was pleasant and seemed happy to receive attention from me as well as from her sons.  She complained a little about the \"rotten care,\" here at the hospital, especially the lack of variety at meal time, and alluded to the wonderful care she'd received at [facility] when her sons were born (55-60 years ago).    Pt accepted my offer of prayer.  I and other chaplains will continue to follow for support.                                                                                                                                                    Dorene Gaxiola M.A.  Staff   Pager [phone]  Phone [phone]      "

## 2019-10-31 NOTE — CONSULTS
"[facility] Psychiatric Consult Progress Note      Interval History:   Pt seen, care reviewed with treatment team.  Staff report that the patient has been intermittently agitated.  She reportedly was only oriented to self earlier this morning.  There is concern that she is not currently capable of making decisions hence this consult.  On direct interview, the patient is able to describe the circumstance of her hospitalization and reports that she has been in the hospital for about a week.  She is aware that today is October 31 and remarks that it is Halloween which she states is her aunt's birthday.  She states she has been told that her wound has to heal before she is able to leave the hospital.  She was asked what she wants to do when she leaves the hospital and she reports that she and her sons have been talking about buying a house and so she states \"if they want to do it this is as good time to do it.\"  She was asked if she recalled where she was residing before she came to the hospital and she could not give any or describe the location from where she was brought to the hospital.  She does not express any specific choices and does not necessarily display evidence of appreciation of her current situation.  She does not endorse the presence of auditory or visual hallucinations.  Staff report that her agitation appears to be well managed with olanzapine at this time.   Review of systems:   The Review of Systems is negative other than noted in the HPI     Medications:       amLODIPine  10 mg Oral Daily     carBAMazepine  300 mg Oral QPM     carBAMazepine  200 mg Oral QAM     cephALEXin  250 mg Oral Q8H     ferrous sulfate  325 mg Oral BID w/meals     valsartan  320 mg Oral Daily    And     hydrochlorothiazide  12.5 mg Oral Daily     mirtazapine  15 mg Oral At Bedtime     OLANZapine zydis  5 mg Oral At Bedtime     sertraline  100 mg Oral Daily     sodium chloride (PF)  3 mL Intracatheter Q8H     sore throat " lozenge, hydrALAZINE, hypromellose-dextran, lidocaine 4%, lidocaine (buffered or not buffered), melatonin, naloxone, OLANZapine, sodium chloride (PF)      Mental Status Examination:     Appearance:  awake, alert, appeared as age stated and cooperative  Eye Contact:  intense  Speech:  clear, coherent and normal prosody  Psychomotor Behavior:  no evidence of tardive dyskinesia, dystonia, or tics  Mood:  better  Affect:  mood congruent and intensity is normal  Thought Process:  logical and linear no loose associations  Thought Content:  no evidence of suicidal ideation or homicidal ideation and no evidence of psychotic thought  Oriented to:  Person and place  Attention Span and Concentration:  poor  Recent and Remote Memory:  poor  Fund of Knowledge: delayed  Muscle Strength and Tone: Weak  Gait and Station: NA  Insight:  limited  Judgment:  limited        Labs/vitals:   No results found for this or any previous visit (from the past 24 hour(s)).  B/P: 177/78, T: 98.4, P: 70, R: 20    Impression:    Kalli Brown is a 89-year-old  mother of 2 with history of dementia, who presented to the hospital for a wound check, but became delirious on account of which she has been receiving olanzapine p.r.n.  Psychiatry was consulted to render an opinion on her current presentation.  At the time of my assessment, the patient was calm and staff report that she has been appropriate all day, requiring no emergency interventions.     10/31/2019: Continues to display intermittent agitation and refusal of treatment.      DIagnoses:   1.  Delirium, probably due to multiple factors.   2.  Unspecified neurocognitive disorder.   3.  Unspecified anxiety disorder.   4.  Right shin wound.   5.  Hypertension.   6.  Chronic kidney disease, stage III.   7.  History of multiple myeloma.   8.  Trigeminal neuralgia.        Plan:   1. Written information given on medications. Side effects, risks, benefits reviewed.  2.  Although patient is  oriented to self and place at the time of this assessment, she has displayed evidence of fluctuating cognition and already has an established diagnosis of dementia.  She is thus not deemed to have capacity to make decisions.  3.  Continue current medications without changes.        Attestation:  Patient has been seen and evaluated by me,  Manasa Banegas MD

## 2019-10-31 NOTE — PROGRESS NOTES
[facility]    Medicine Progress Note - Hospitalist Service        Date of Admission:  10/25/2019 12:11 PM    Assessment & Plan:   Kalli Brown is a 89 year old female with a history of dementia, multiple myeloma, MPGN, HTN and CKD stage III who presented to the ED on 10/25/2019 with a right shin wound after a mechanical fall.     Right shin wound  Approximately 3 weeks ago the patient fell out of her wheel chair and scraped her right shin.  She was apparently monitored at her care facility and noticed development of a possible ulcer with bleeding from the wound and was sent to the ER with concerns for wound infection  -X-ray of the tib/fib showed soft tissue gas but suspect this is more likely due to fistula tracting.    -MRI of the leg was done without evidence of osteomyelitis   -Initially was treated with IV antibiotics with Ceftriaxone and Vancomycin with pharmacy to dose.  Has since been transitioned to PO Keflex without worsening   -WOC consulted and appreciate their recommendations   -Surgery consulted and appreciate their recommendations.  No plans for surgical intervention      HTN   Blood pressures have been elevated while here and it is unclear if the patient has been taking her blood pressure medications  -Continue PTA Norvasc and Diovan/HCTZ  -PRN Hydralazine      Dementia  Delirium   -Currently resides in [facility]  -Intermittently agitated  -Continue to monitor  -Delirium precautions in place  -Continue Zyprexa 5 mg ODT at bedtime   -Reconsulted psychiatry to help with medical decision making capacity determination  -Palliative care consulted per family request, family now leaning towards hospice    CKD stage III   H/o MPGN   Baseline Cr appears to be 1 and was 1.13 on presentation   -At baseline     Chronic anemia  Hgb 8.2 on admission and baseline appears to be 7.5-9.  On recheck was 7.5  -monitor as needed     Diet: Regular Diet Adult  Room Service     DVT Prophylaxis:  "Pneumatic Compression Devices   Cassidy Catheter: not present  Code Status: Full Code     Disposition Plan    Expected discharge: Awaiting placement  Entered: Jayme Castro MD 10/31/2019, 9:09 AM        The patient's care was discussed with the Bedside Nurse and Patient.    Jayme Castro MD  Hospitalist Service  [facility]    ______________________________________________________________________    Interval History   Patient was apparently agitated and kicking at staff and refusing medication earlier.    Data reviewed today: I reviewed all medications, new labs and imaging results over the last 24 hours. I personally reviewed no images or EKG's today.    Physical Exam   Vital signs:  Temp: 97.1  F (36.2  C) Temp src: Oral BP: (!) 179/80 Pulse: 67 Heart Rate: 60 Resp: 16 SpO2: 93 % O2 Device: Nasal cannula Oxygen Delivery: 2 LPM Height: 170.2 cm (5' 7\") Weight: 46.7 kg (103 lb)  Estimated body mass index is 16.13 kg/m  as calculated from the following:    Height as of this encounter: 1.702 m (5' 7\").    Weight as of this encounter: 46.7 kg (103 lb).      Wt Readings from Last 2 Encounters:   10/25/19 46.7 kg (103 lb)   10/06/19 46.7 kg (103 lb)       Gen: AAOX1, fused, slightly agitated but redirectable  Resp: CTA B/L, normal WOB  CVS: RRR, no murmur  Abd/GI: Soft, non-tender.   Skin: Warm, dry no rashes  MSK: No joint deformities, no pedal edema  Neuro- CN- intact. No focal deficits.      Data   Recent Labs   Lab 10/29/19  0940 10/26/19  0618 10/25/19  1253   WBC 6.6 12.3* 7.9   HGB 7.5* 7.6* 8.2*   * 100 104*    310 392   NA  --   --  141   POTASSIUM  --   --  4.5   CHLORIDE  --   --  109   CO2  --   --  26   BUN  --   --  40*   CR  --  1.18* 1.13*   ANIONGAP  --   --  6   EBONY  --   --  8.0*   GLC  --   --  95       No results found for this or any previous visit (from the past 24 hour(s)).  Medications       amLODIPine  10 mg Oral Daily     carBAMazepine  300 mg Oral QPM     " carBAMazepine  200 mg Oral QAM     cephALEXin  250 mg Oral Q8H     ferrous sulfate  325 mg Oral BID w/meals     valsartan  320 mg Oral Daily    And     hydrochlorothiazide  12.5 mg Oral Daily     mirtazapine  15 mg Oral At Bedtime     OLANZapine zydis  5 mg Oral At Bedtime     sertraline  100 mg Oral Daily     sodium chloride (PF)  3 mL Intracatheter Q8H

## 2019-10-31 NOTE — PLAN OF CARE
PT-  Pt refused PT today. States she's not feeling well.  Encouraged mobility and getting up to the chair for a while but pt unwilling to get out of bed at time of appt.

## 2019-10-31 NOTE — PLAN OF CARE
Patient turns with assist of 2; repositioned once refused once.  Encourage turn and repo q2hr to patient.  Denies pain.  Hypertensive, on 2L NC.  Alert; oriented to self only.  RLE wound dressing changed now CDI.  CMS intact. Patient kicking at staff and refused all medication.  Unable to follow assess patient, due to agitation.  Purewick in place.  Refused to feet or drink.  Awaiting psych consult.

## 2019-10-31 NOTE — PLAN OF CARE
Pt here with RLE leg would. A&Ox4. CMS intact with generalized weakness. VSS. Regular diet, thin liquids. Takes pills whole one at a time. Up with A2, lift. Denies pain. Blanchable redness to coccyx - foam in place, T/R Q2. Plan for palliative consult & psych to see regarding decision making capability. Discharge plan pending.

## 2019-10-31 NOTE — PROGRESS NOTES
SW:  D: Following for discharge planning. Call placed to [facility] [phone] and spoke with Amanda in Wellness Office who informs that they can accept pt back on hospice care if the family chooses this route. Would need the pt back to facility before 1800. Informing that they currently assit pt with all ADL's and IADL's however as a facility, they cannot do wound care more than 2-3 times per week on their end therefore if pt needs daily wound care they are encouraging additional HC RN to come in for this care. Facility fax number [phone].    Spoke with pt son Chavo who shares concerns re: infection in pt's leg. Informing that they are leaning toward hospice but unsure of the best coarse of action, states they are looking for guidance from MD as to the severity of infection to determine if it is something that can be managed at [facility] or if rehab is appropriate, also considering hospice and agreeable to an informational hospice meeting with [facility]. Family preference is to meet with [facility] around 10:00 tomorrow. Chavo reports that his brother Fuad is an ER MD and would like to speak with the hospitalist to get further recommendations and assessment of the pt's wound and overall trajectory. Seem slightly unsure of goals of care, Palliative also involved and following this pt. MD paged and requested he call son Fuad.     Call placed to [facility] and left VM for Juhi requesting return call to complete Hospice referral and schedule informational meeting. Awaiting return call.     ADDENDUM:  Spoke with Juhi at [facility] who informs that they will have Ellie RN available to come to [facility] tomorrow at 10:00am for a hospice consult. Call placed to dary Tony and updated him on meeting time. Family is in agreement with this plan.       P: SW will continue to follow for discharge plans.      Felisha Dixon, GABRIEL   [phone]  [facility]

## 2019-10-31 NOTE — CONSULTS
Patient seen for follow-up psychiatric consultation. Please see my note for details. Thanks.    Manasa Banegas MD

## 2019-11-01 NOTE — PROGRESS NOTES
11/1/19  Writer met with patient's son Chavo and Fuad to discuss hospice philosophy, benefit and services. Sons in agreement with philosophy and want their mother to be comfortable. Son's want patient to return to [facility] when possible.    Medications are being filled at the discharge pharmacy and will be sent home with the pt.   The POLST documentation completed.  The Hospice consents were signed on 11/1/19    By son Fuad.   Fuad and Chavo/mireya have the 24 hour phone number for [facility] for any questions or concerns.   Hospice admission date/time TBD due unknown discharge time/date.  Care coordinated with Tara SCHUSTER Coord, Raven SCHUSTER, Stephanie GOINS and Dr. Castro.    Please discharge patient with 2 week supply of current hospital medications especially scheduled and PRN zyprexa for behaviors.   Please also discharge with comfort meds listed below:    1. Lorazepam 0.5mg tab: give 0.25mg po/SL every 4 hours as needed for anxiety or restlessness.  2. Atropine 1% drops: place 2 drops po/SL every 2 hours as needed for oral secretions.  3. Senna 8.6mg tab: give 1 tab twice daily as needed for constipation.  4. Acetaminophen 650mg suppository; insert 1 supp NC every 4 hours as needed for pain/fever.  5. Bisacodyl 10mg suppository; insert 1 supp NC daily as needed for constipation.  6. Hydromorphone 2mg tab; give 1mg every 2 hours as needed for pain/dyspnea.    Thank you,  Ellie Desir,  Hospice RN  Cell: [phone]  Office: [phone]

## 2019-11-01 NOTE — PLAN OF CARE
PT- Attempted to see pt this AM. After multiple attempts to get pt to transfer to chair pt declined stating she just wants to rest without having people bother her.

## 2019-11-01 NOTE — PLAN OF CARE
Pt A&Ox1-2, confused and agitated at times. CMS intact. VSS. Up w/ A2-3, lift used. Refused all pills overnight. Incontinent of bowel and bladder. Hospice meeting today @ 1000. Continue to monitor.

## 2019-11-01 NOTE — PROGRESS NOTES
[facility]    Medicine Progress Note - Hospitalist Service        Date of Admission:  10/25/2019 12:11 PM    Assessment & Plan:   Kalli Brown is a 89 year old female with a history of dementia, multiple myeloma, MPGN, HTN and CKD stage III who presented to the ED on 10/25/2019 with a right shin wound after a mechanical fall.     Right shin wound  Approximately 3 weeks ago the patient fell out of her wheel chair and scraped her right shin.  She was apparently monitored at her care facility and noticed development of a possible ulcer with bleeding from the wound and was sent to the ER with concerns for wound infection  -X-ray of the tib/fib showed soft tissue gas but suspect this is more likely due to fistula tracting.    -MRI of the leg was done without evidence of osteomyelitis   -Initially was treated with IV antibiotics with Ceftriaxone and Vancomycin with pharmacy to dose.  Has since been transitioned to PO Keflex without worsening.  Plan for total 2 weeks of antibiotics  -WOC consulted and appreciate their recommendations   -Surgery consulted and appreciate their recommendations. No plans for surgical intervention   -Palliative care following, after discussion with family yesterday hospice consulted.  -Hospice to meet family today at 10 AM     HTN   Blood pressures have been elevated while here and it is unclear if the patient has been taking her blood pressure medications  -Continue PTA Norvasc and Diovan/HCTZ although patient has been refusing medication  -PRN Hydralazine      Dementia  Delirium   -Currently resides in [facility]  -Intermittently agitated  -Continue to monitor  -Delirium precautions in place  -Continue Zyprexa 5 mg ODT at bedtime   -Hospice consult as above.    CKD stage III   H/o MPGN   Baseline Cr appears to be 1 and was 1.13 on presentation   -At baseline     Chronic anemia  Hgb 8.2 on admission and baseline appears to be 7.5-9.  On recheck was 7.5  -monitor as needed  "    Diet: Regular Diet Adult  Room Service     DVT Prophylaxis: Pneumatic Compression Devices   Cassidy Catheter: not present  Code Status: DNR/DNI     Disposition Plan    Expected discharge: Pending hospice evaluation today  Entered: Jayme Castro MD 11/01/2019, 9:41 AM        The patient's care was discussed with the Bedside Nurse and Patient.    Jayme Castro MD  Hospitalist Service  [facility]    ______________________________________________________________________    Interval History   Continues to be intermittently agitated and refusing medicines this morning.  Blood pressure has been on the higher side.  Family decided to pursue hospice care.    Data reviewed today: I reviewed all medications, new labs and imaging results over the last 24 hours. I personally reviewed no images or EKG's today.    Physical Exam   Vital signs:  Temp: 97.7  F (36.5  C) Temp src: Axillary BP: (!) 167/75 Pulse: 80 Heart Rate: 77 Resp: 16 SpO2: 91 % O2 Device: Nasal cannula Oxygen Delivery: 2 LPM Height: 170.2 cm (5' 7\") Weight: 46.7 kg (103 lb)  Estimated body mass index is 16.13 kg/m  as calculated from the following:    Height as of this encounter: 1.702 m (5' 7\").    Weight as of this encounter: 46.7 kg (103 lb).      Wt Readings from Last 2 Encounters:   10/25/19 46.7 kg (103 lb)   10/06/19 46.7 kg (103 lb)       Gen: AAOX1, confused, slightly agitated but redirectable  Resp: CTA B/L, normal WOB  CVS: RRR, no murmur  Abd/GI: Soft, non-tender.   Skin: Right leg wound which is about 2.5 x 2 x 1 cm with minimal surrounding erythema.  Neuro- CN- intact. No focal deficits.      Data   Recent Labs   Lab 10/29/19  0940 10/26/19  0618 10/25/19  1253   WBC 6.6 12.3* 7.9   HGB 7.5* 7.6* 8.2*   * 100 104*    310 392   NA  --   --  141   POTASSIUM  --   --  4.5   CHLORIDE  --   --  109   CO2  --   --  26   BUN  --   --  40*   CR  --  1.18* 1.13*   ANIONGAP  --   --  6   EBONY  --   --  8.0*   GLC  --   " --  95       No results found for this or any previous visit (from the past 24 hour(s)).  Medications       amLODIPine  10 mg Oral Daily     carBAMazepine  300 mg Oral QPM     carBAMazepine  200 mg Oral QAM     cephALEXin  250 mg Oral Q8H     ferrous sulfate  325 mg Oral BID w/meals     valsartan  320 mg Oral Daily    And     hydrochlorothiazide  12.5 mg Oral Daily     mirtazapine  15 mg Oral At Bedtime     OLANZapine zydis  5 mg Oral At Bedtime     sertraline  100 mg Oral Daily     sodium chloride (PF)  3 mL Intracatheter Q8H

## 2019-11-01 NOTE — PROGRESS NOTES
Pt discharge without issue, pt to transfer to [facility] via [facility], RX given to [facility] transport.  Belongings sent - including clothes, glasses & dressing supplies.

## 2019-11-01 NOTE — PLAN OF CARE
A&OX4, intermittently confused. VSS on 2L NC except for hypertensive. Scheduled BP given. BP still high after. PRN hydralazine given. Denies pain.Pt getting more agitated  towards the end of the shift. Kicking staff during cares. Incontinent of both urine and stool. Purewick in place. Up ceiling lift. Seen by Psych and palliative care. Plan for hospice meeting at 10:00 AM tomorrow. PIV SL.Regular diet. Dressing on the right shin c/d/i. Continue to monitor.

## 2019-11-01 NOTE — PROGRESS NOTES
Spoke with Fuad - pts son, updated him that wound care can only be done 3 times a week per hospice.  Spoke with Dr. Carrillo & Zakia from wound care - both are OK with this plan.  Fuad is OK with this plan.  Plan to discharge to [facility] @ 1500.

## 2019-11-01 NOTE — PLAN OF CARE
Pt is A&O x1. Denies pain. Up with 2 and lift. Incontinent with brief on. Will discharge to assisted living today.

## 2019-11-01 NOTE — PROGRESS NOTES
D: BRISEIDA following for discharge planning.  I: Pt will discharge today back to [facility] with  Hospice. BRISEIDA updated Shashi in nursing at [facility] and he is in agreement. Orders faxed. Spoke with Ellie with  Hospice. Family in agreement. [facility] BLS set for 1500. PCS faxed. Nursing aware.   P: Discharge.    Stephanie Kong, MSW, LGSW  [id number]

## 2019-11-01 NOTE — TELEPHONE ENCOUNTER
Reason for Call:  Other Pt information     Detailed comments: Hospice called and wanted to know if Dr. Stovall was going to be following the Pt in hospice.     Phone Number Maria Isabel can be reached at: Other phone number:  [phone]    Best Time: Any    Can we leave a detailed message on this number? YES    Call taken on 11/1/2019 at 11:31 AM by Monse Monroy

## 2019-11-01 NOTE — DISCHARGE SUMMARY
[facility]    Discharge Summary  Hospitalist    Date of Admission:  10/25/2019  Date of Discharge:  11/1/2019  Discharging Provider: Jayme Castro MD    Discharge Diagnoses      Right shin wound  HTN   Dementia  Acute Delirium   CKD stage III   H/o MPGN   Chronic anemia    Hospital Course:  Kalli Brown is a 89 year old female with a history of dementia, multiple myeloma, MPGN, HTN and CKD stage III who presented to the ED on 10/25/2019 with a right shin wound after a mechanical fall.     Right shin wound  Approximately 3 weeks ago the patient fell out of her wheel chair and scraped her right shin.  She was apparently monitored at her care facility and noticed development of a possible ulcer with bleeding from the wound and was sent to the ER with concerns for wound infection  -MRI of the leg was done without evidence of osteomyelitis   -Initially was treated with IV antibiotics with Ceftriaxone and Vancomycin with pharmacy to dose.  Has since been transitioned to PO Keflex without worsening.  Plan for 1 more week of oral antibiotics  -WOC consulted and appreciate their recommendations   -Surgery consulted and appreciate their recommendations. No plans for surgical intervention   -Palliative care consulted, after meeting with family plan is to discharge to assisted living with hospice.     HTN   Blood pressures have been elevated while here and it is unclear if the patient has been taking her blood pressure medications  -Continue PTA Norvasc and Diovan/HCTZ although patient has been refusing medication     Dementia  Delirium   -Currently resides in [facility]  -Intermittently agitated  -Continue Zyprexa 5 mg ODT at bedtime   -Discharge with hospice as above.     CKD stage III   H/o MPGN   Baseline Cr appears to be 1 and was 1.13 on presentation   -At baseline     Chronic anemia  Hgb 8.2 on admission and baseline appears to be 7.5-9.  On recheck was 7.5  -monitor as needed     Jayme  MD Matthew    Significant Results and Procedures   See below    Pending Results     Unresulted Labs Ordered in the Past 30 Days of this Admission     No orders found from 9/25/2019 to 10/26/2019.          Code Status   DNR / DNI       Primary Care Physician   Man Stovall MD    Physical Exam   Temp: 97.7  F (36.5  C) Temp src: Axillary BP: (!) 167/75 Pulse: 80 Heart Rate: 77 Resp: 16 SpO2: 91 % O2 Device: Nasal cannula Oxygen Delivery: 2 LPM    Constitutional: Awake, alert, confused and disoriented  Respiratory: CTA B/L, Normal WOB  Cardiovascular: RRR, No murmur  GI: Soft, Non- tender, BS- normoactive  Skin- Rt LE with wound 2.5X2 cm  Neuro: CN- grossly intact    Discharge Disposition   Discharged to assisted living with hospice  Condition at discharge: Guarded    Consultations This Hospital Stay   SURGERY GENERAL IP CONSULT  WOUND OSTOMY CONTINENCE NURSE  IP CONSULT  PHARMACY TO DOSE VANCO  PHARMACY IP CONSULT  PSYCHIATRY IP CONSULT  OCCUPATIONAL THERAPY ADULT IP CONSULT  PHYSICAL THERAPY ADULT IP CONSULT  PSYCHIATRY IP CONSULT  CARE TRANSITION RN/SW IP CONSULT  PALLIATIVE CARE ADULT IP CONSULT  OCCUPATIONAL THERAPY ADULT IP CONSULT  PSYCHIATRY IP CONSULT    Time Spent on this Encounter   I, Jayme Castro MD, personally saw the patient today and spent greater than 30 minutes discharging this patient.    Discharge Orders      Follow-up and recommended labs and tests    Follow up with [facility]     Activity    Your activity upon discharge: activity as tolerated     Wound care and dressings    Plan of care for wound located right shin, 3 times a week   - clean bilateral feet / legs with soap and water, rinse, dry  - apply lotion from toes to knees, not between toes.    1. Perform hand hygiene and apply gloves. Explore the wound with a q tip--you will find undermining circumferentially in the wound  2. Clean wound with saline or MicroKlenz Spray* [NOTE: use the MicroKlenz Spray or 10cc saline  syringes, press to the wound, blast out the wound, cleaning out all of pocketing you explored with the q tip]. Mop dry with sterile gauze.   3. Paint surrounding skin with No Sting Skin Prep* (#[id number]) and allow to dry thoroughly.  Perform hand hygiene and change gloves.   4. Cut a foot long length of NuGauze Ribbon* and run the ribbon through a glob of Iodosorb gel* in the palm of your gloved hand.  Using a q-tip, tuck this ribbon into the wound bed pocketing.   5. Press a Mepilex  Border Dressing* (#[id number]) to the area, making sure to conform nicely to skin curvatures (begin placing the Mepilex at the most distal aspect first, smooth into place in an upward direction, then smooth side to side)   6. With a marker, intial, date and time the dressing.     Staff must reposition pt every 1 to 2 hours (side to side) when in bed and hourly when up to the chair to relieve pressure and promote perfusion to tissue.  KEEP HEELS FLOATED OFF BED AT ALL TIMES, AT LEAST ONE PILLOW UNDER EACH CALF.  All staff should be taught pt's repositioning needs.    NOTE:  Iodosorb Gel should not be used longer than 14 days. After 14 days the gel should be stopped and a new plan established, this may mean only a simple, gauze dressing.  The Iodosorb Gel should be stopped after use on November 10, 2019    *equivalent products OK per RN     DNR/DNI     Diet    Follow this diet upon discharge: Orders Placed This Encounter      Room Service      Regular Diet Adult       Discharge Medications   Current Discharge Medication List      START taking these medications    Details   acetaminophen (TYLENOL) 650 MG suppository Place 1 suppository (650 mg) rectally every 4 hours as needed for fever or mild pain (Do not exceed 4000 mg total acetaminophen per day.) Unwrap prior to insertion.  Qty: 12 suppository, Refills: 1    Associated Diagnoses: Hospice care patient      atropine 1 % ophthalmic solution Take 2-4 drops by mouth, place under tongue or place  inside cheek every 2 hours as needed for other (terminal respiratory secretions) Not for ophthalmic use.  Qty: 5 mL, Refills: 1    Associated Diagnoses: Hospice care patient      bisacodyl (DULCOLAX) 10 MG suppository Unwrap and insert 1 suppository rectally twice daily as needed for constipation.  Qty: 12 suppository, Refills: 1    Associated Diagnoses: Hospice care patient      cephALEXin (KEFLEX) 250 MG capsule Take 1 capsule (250 mg) by mouth every 8 hours for 7 days  Qty: 21 capsule, Refills: 0    Associated Diagnoses: Open wound of right lower leg, initial encounter      HYDROmorphone (DILAUDID) 2 MG tablet Take 0.5-1 tablets (1-2 mg) by mouth or place under tongue every 2 hours as needed for moderate to severe pain (and/or shortness of breath.)  Qty: 90 tablet, Refills: 0    Associated Diagnoses: Hospice care patient      LORazepam (ATIVAN) 0.5 MG tablet Take 0.5-1 tablets (0.25-0.5 mg) by mouth, place under tongue or insert rectally every 4 hours as needed for anxiety (restlessness)  Qty: 30 tablet, Refills: 1    Associated Diagnoses: Hospice care patient      MEDICATION INSTRUCTION If care facility cannot accept or use ranges, facility is instructed to use lower end of dosing range    Associated Diagnoses: Hospice care patient      OLANZapine zydis (ZYPREXA) 5 MG ODT Take 1 tablet (5 mg) by mouth At Bedtime  Qty: 30 tablet, Refills: 0    Associated Diagnoses: Dementia with behavioral disturbance, unspecified dementia type (H)      sennosides (SENOKOT) 8.6 MG tablet Take 1-2 tablets by mouth 2 times daily  Qty: 100 tablet, Refills: 1    Associated Diagnoses: Hospice care patient         CONTINUE these medications which have NOT CHANGED    Details   acetaminophen (TYLENOL) 500 MG tablet Take 2 tablets (1,000 mg) by mouth 3 times daily  Qty: 540 tablet, Refills: 1    Associated Diagnoses: Chronic low back pain      amLODIPine (NORVASC) 10 MG tablet Take 1 tablet (10 mg) by mouth daily  Qty: 90 tablet, Refills:  3    Associated Diagnoses: Benign essential hypertension      aspirin (ASPIRIN LOW DOSE) 81 MG chewable tablet 1 TAB BY MOUTH DAILY (DX:ANTICOAGULATION)  Qty: 30 tablet, Refills: 11    Associated Diagnoses: Benign essential hypertension      !! carBAMazepine (TEGRETOL) 200 MG tablet Take 200 mg by mouth every morning      !! carBAMazepine (TEGRETOL) 200 MG tablet Take 300 mg by mouth every evening      docusate sodium (COLACE) 250 MG capsule Take 1 capsule (250 mg) by mouth daily as needed for constipation  Qty: 90 capsule, Refills: 3    Associated Diagnoses: Constipation, unspecified constipation type      latanoprost (XALATAN) 0.005 % ophthalmic solution Place 1 drop into both eyes At Bedtime  Qty: 7.5 mL, Refills: 3    Associated Diagnoses: Cataract, unspecified cataract type, unspecified laterality      polyethylene glycol (MIRALAX/GLYCOLAX) packet Take 1 packet by mouth daily      timolol maleate (TIMOPTIC) 0.5 % ophthalmic solution Place 1 drop into the right eye daily      valsartan-hydrochlorothiazide (DIOVAN HCT) 320-12.5 MG tablet Take 1 tablet by mouth daily  Qty: 90 tablet, Refills: 3    Associated Diagnoses: CKD (chronic kidney disease) stage 3, GFR 30-59 ml/min (H)      order for DME Equipment being ordered: Incentive Spirometer  Qty: 1 each, Refills: 0    Associated Diagnoses: Community acquired pneumonia, unspecified laterality       !! - Potential duplicate medications found. Please discuss with provider.      STOP taking these medications       calcium citrate-vitamin D (CITRACAL) 315-250 MG-UNIT TABS per tablet Comments:   Reason for Stopping:         ferrous sulfate (FEROSUL) 325 (65 Fe) MG tablet Comments:   Reason for Stopping:         furosemide (LASIX) 40 MG tablet Comments:   Reason for Stopping:         menthol (HALLS COUGH DROPS) 7 MG LOZG Comments:   Reason for Stopping:         mirtazapine (REMERON) 15 MG tablet Comments:   Reason for Stopping:         sertraline (ZOLOFT) 100 MG tablet  Comments:   Reason for Stopping:         traMADol (ULTRAM) 50 MG tablet Comments:   Reason for Stopping:         Vitamin D, Cholecalciferol, 1000 units TABS Comments:   Reason for Stopping:             Allergies   Allergies   Allergen Reactions     Atorvastatin      Data   Most Recent 3 CBC's:  Recent Labs   Lab Test 10/29/19  0940 10/26/19  0618 10/25/19  1253   WBC 6.6 12.3* 7.9   HGB 7.5* 7.6* 8.2*   * 100 104*    310 392      Most Recent 3 BMP's:  Recent Labs   Lab Test 10/26/19  0618 10/25/19  1253 10/06/19  0725 10/05/19  1305   NA  --  141 143 139   POTASSIUM  --  4.5 4.3 4.4   CHLORIDE  --  109 113* 110*   CO2  --  26 26 28   BUN  --  40* 48* 54*   CR 1.18* 1.13* 1.06* 1.08*   ANIONGAP  --  6 4 1*   EBONY  --  8.0* 8.0* 8.6   GLC  --  95 95 132*     Most Recent 2 LFT's:  Recent Labs   Lab Test 05/20/19  1715 07/02/18  1227   AST 50* 32   ALT 58* 25   ALKPHOS 137 145   BILITOTAL 0.2 0.4     Most Recent INR's and Anticoagulation Dosing History:  Anticoagulation Dose History     Recent Dosing and Labs Latest Ref Rng & Units 7/25/2005 7/28/2005 8/1/2005 8/4/2005 8/8/2005 10/23/2015 2/25/2016    INR 0.86 - 1.14 1.19(H) 1.20(H) 1.25(H) 1.52(H) 1.59(H) 0.99 0.95        Most Recent 3 Troponin's:  Recent Labs   Lab Test 11/18/18  1101 10/23/15  1030 07/20/14  0316   TROPI <0.015 <0.015  The 99th percentile for upper reference range is 0.045 ug/L.  Troponin values in   the range of 0.045 - 0.120 ug/L may be associated with risks of adverse   clinical events.   <0.012     Most Recent Cholesterol Panel:No lab results found.  Most Recent 6 Bacteria Isolates From Any Culture (See EPIC Reports for Culture Details):  Recent Labs   Lab Test 10/25/19  1439 10/25/19  1434 07/21/15  2145 01/31/15  1317 08/24/13  0250   CULT No growth No growth No growth <10,000 colonies/mL mixed urogenital denise No growth     Most Recent TSH, T4 and A1c Labs:  Recent Labs   Lab Test 07/02/18  1227   TSH 2.37       Results for orders  placed or performed during the hospital encounter of 10/25/19   XR Tibia & Fibula Right 2 Views    Narrative    RIGHT TIBIA AND FIBULA TWO VIEWS 10/25/2019 1:21 PM     HISTORY: Possible tibial osteopenia.      Impression    IMPRESSION: Soft tissue gas in the anterolateral aspect of the mid  leg. Osteopenia. No apparent osseous destruction or periosteal  reaction. Arterial calcifications are noted within the leg.    MELA LU MD   MR Tibia Fibula Lower Leg Right wo Contrast    Narrative    MR TIBIA FIBULA LOWER LEG RIGHT WITHOUT CONTRAST 10/26/2019 1:25 PM     HISTORY: Shin hematoma after an injury. Now with ulcer in the region.  Evaluate for osteomyelitis.    TECHNIQUE: Multiplanar multisequence MRI without contrast. Contrast  could not be given due to lack of venous access. The distal lower leg  is not seen due to artifact which is probably motion artifact.    COMPARISON: None.    FINDINGS: No acute or chronic bony abnormality. In particular, there  is no evidence for osteomyelitis of the right tibia or fibula. The  patient's known area of injury is seen in the anterior lateral mid  right lower leg. The subcutaneous tissues are swollen and there is a  linear focus of increased T2 signal in this area suggesting a small  fluid collection. The findings are consistent with a known  subcutaneous hematoma but is difficult to differentiate a bland  hematoma from an infected hematoma. This area measures up to 8.0 cm  craniocaudad dimension, 3.1 cm anterior posterior dimension and 1.0 cm  in thickness (images 37 through 47 of series 701 and images 10 through  16 of series 601). There is suggestion of an overlying shallow ulcer.  There is no involvement of deeper muscle compartments. The remainder  of the soft tissues in the visualized right lower leg are  unremarkable. Incidental note of a moderate-sized popliteal cyst at  the right knee. The left lower leg is included in the field-of-view  and shows no significant  bony or soft tissue abnormality.      Impression    IMPRESSION:  1. Subcutaneous thickening and small amount of nonspecific fluid in  the anterior lateral mid right lower leg consistent with known  posttraumatic subcutaneous hematoma. An infected hematoma cannot be  excluded.  2. No evidence for osteomyelitis in the adjacent bones.  3. Right popliteal cyst.      BARRETT BRAN MD

## 2019-11-03 NOTE — PLAN OF CARE
Physical Therapy Discharge Summary    Reason for therapy discharge:    Discharged to [place] on hospice.      Progress towards therapy goal(s). See goals on Care Plan in [facility] electronic health record for goal details.  Goals not met.  Barriers to achieving goals:   discharge from facility.    Therapy recommendation(s):    No further therapy is recommended.

## 2019-11-14 NOTE — PROGRESS NOTES
Clinic Care Coordination Contact    Situation: Patient chart reviewed by care coordinator.    Background: Pt was recently in the hospital due to a wound on her right leg that resulted from a fall.     Assessment: Pt was discharged on 11/1 to her custodial with [facility].    Plan/Recommendations: No further outreaches will be made at this time unless a new referral is made or a change in the pt's status occurs. Patient was provided with [facility] contact information and encouraged to call with any questions or concerns.    EUSEBIO Damico, [facility]  Clinic Care Coordinator  [facility]  [phone]  [email or web address]

## 2019-12-03 NOTE — TELEPHONE ENCOUNTER
Dr Stovall, please read EXPO Communicationst message from patient's son.      Do you want to Rx a different med?    Or try for a PA?    [facility], [address]    Thank you,  Mimi ROSENBAUM RN,BSN

## 2019-12-05 NOTE — TELEPHONE ENCOUNTER
Central Prior Authorization Team   Phone: [phone]      PA Initiation    Medication: Olanzapine 5 mg ODT  Insurance Company: [employer] [place] - Phone [phone] Fax [phone]  Pharmacy Filling the Rx: [facility], [place] - [address] [place] [address]  Filling Pharmacy Phone: [phone]  Filling Pharmacy Fax:    Start Date: 12/5/2019

## 2019-12-05 NOTE — TELEPHONE ENCOUNTER
Prior Authorization Retail Medication Request    Medication/Dose: Olanzapine 5 mg ODT  ICD code (if different than what is on RX):  F03.91  Previously Tried and Failed:  None  Rationale:  Patient with dementia and delirium stable on medications    Insurance Name:  [facility]  Insurance ID:  [id number]      Pharmacy Information (if different than what is on RX)  Name:  [facility]  Phone:  [phone]

## 2019-12-05 NOTE — TELEPHONE ENCOUNTER
Prior authorization started in separate encounter.    Alexis Hamilton CMA on 12/5/2019 at 1:55 PM

## 2019-12-06 NOTE — TELEPHONE ENCOUNTER
Prior Authorization Approval    Authorization Effective Date:    Authorization Expiration Date:    Medication: Olanzapine 5 mg ODT  Approved Dose/Quantity:    Reference #:     Insurance Company: [employer] [place] - Phone [phone] Fax [phone]  Expected CoPay:       CoPay Card Available:      Foundation Assistance Needed:    Which Pharmacy is filling the prescription (Not needed for infusion/clinic administered): [facility], [address]  Pharmacy Notified: Yes  Patient Notified: Yes **Instructed pharmacy to notify patient when script is ready to /ship.**

## 2019-12-11 ENCOUNTER — TELEPHONE (OUTPATIENT)
Dept: FAMILY MEDICINE | Facility: CLINIC | Age: 84
End: 2019-12-11

## 2019-12-11 NOTE — TELEPHONE ENCOUNTER
Reason for Call:  Other call back    Detailed comments: [facility] Medical Examiners Office calling to Report patient's Death (12/10/19) Requesting a call back to discuss and sign Death Certificate    Phone Number Medical Examiner can be reached at: Other phone number:  [phone]    Best Time: today    Can we leave a detailed message on this number? Not Applicable    Call taken on 12/11/2019 at 8:51 AM by Marce Vasquez

## 2022-10-04 PROBLEM — F03.918 DEMENTIA WITH BEHAVIORAL DISTURBANCE (H): Status: ACTIVE | Noted: 2019-01-01

## 2025-07-09 NOTE — TELEPHONE ENCOUNTER
Tegretol 200 mg, is it prn or scheduled?  What route?      Ask for Chavo.    Latasha Molina, CMA     No